# Patient Record
Sex: MALE | Race: WHITE | NOT HISPANIC OR LATINO | Employment: OTHER | URBAN - NONMETROPOLITAN AREA
[De-identification: names, ages, dates, MRNs, and addresses within clinical notes are randomized per-mention and may not be internally consistent; named-entity substitution may affect disease eponyms.]

---

## 2017-07-28 DIAGNOSIS — M54.50 LOW BACK PAIN: Primary | ICD-10-CM

## 2017-07-31 ENCOUNTER — HOSPITAL ENCOUNTER (OUTPATIENT)
Dept: MRI IMAGING | Facility: HOSPITAL | Age: 77
Discharge: HOME | End: 2017-07-31
Attending: FAMILY MEDICINE | Admitting: FAMILY MEDICINE
Payer: MEDICARE

## 2017-07-31 PROCEDURE — 72148 MRI LUMBAR SPINE W/O DYE: CPT | Mod: TC

## 2017-08-01 DIAGNOSIS — M54.50 LOWER BACK PAIN: ICD-10-CM

## 2017-08-01 DIAGNOSIS — M51.369 DDD (DEGENERATIVE DISC DISEASE), LUMBAR: Primary | ICD-10-CM

## 2017-08-03 ENCOUNTER — HOSPITAL ENCOUNTER (OUTPATIENT)
Dept: INTERVENTIONAL RADIOLOGY/VASCULAR | Facility: HOSPITAL | Age: 77
End: 2017-08-03
Attending: FAMILY MEDICINE
Payer: MEDICARE

## 2017-08-03 DIAGNOSIS — M51.369 DDD (DEGENERATIVE DISC DISEASE), LUMBAR: Primary | ICD-10-CM

## 2017-08-03 DIAGNOSIS — M79.605 LEFT LEG PAIN: Primary | ICD-10-CM

## 2017-08-03 RX ORDER — DEXAMETHASONE SODIUM PHOSPHATE 10 MG/ML
INJECTION, SOLUTION INTRAMUSCULAR; INTRAVENOUS
Status: DISCONTINUED
Start: 2017-08-03 | End: 2017-08-03 | Stop reason: WASHOUT

## 2017-08-03 RX ORDER — METHYLPREDNISOLONE ACETATE 80 MG/ML
INJECTION, SUSPENSION INTRA-ARTICULAR; INTRALESIONAL; INTRAMUSCULAR; SOFT TISSUE
Status: DISCONTINUED
Start: 2017-08-03 | End: 2017-08-03 | Stop reason: WASHOUT

## 2017-08-03 RX ORDER — METHYLPREDNISOLONE ACETATE 80 MG/ML
80 INJECTION, SUSPENSION INTRA-ARTICULAR; INTRALESIONAL; INTRAMUSCULAR; SOFT TISSUE ONCE
Status: DISCONTINUED | OUTPATIENT
Start: 2017-08-03 | End: 2017-08-03 | Stop reason: CLARIF

## 2017-08-03 RX ORDER — IOPAMIDOL 612 MG/ML
15 INJECTION, SOLUTION INTRATHECAL ONCE
Status: DISCONTINUED | OUTPATIENT
Start: 2017-08-03 | End: 2017-08-03 | Stop reason: CLARIF

## 2017-08-04 DIAGNOSIS — Z01.812 BLOOD TESTS PRIOR TO TREATMENT OR PROCEDURE: Primary | ICD-10-CM

## 2017-08-08 ENCOUNTER — APPOINTMENT (OUTPATIENT)
Dept: LAB | Facility: HOSPITAL | Age: 77
End: 2017-08-08
Attending: FAMILY MEDICINE
Payer: MEDICARE

## 2017-08-08 ENCOUNTER — HOSPITAL ENCOUNTER (OUTPATIENT)
Dept: CT IMAGING | Facility: HOSPITAL | Age: 77
Discharge: HOME | End: 2017-08-08
Attending: FAMILY MEDICINE | Admitting: FAMILY MEDICINE
Payer: MEDICARE

## 2017-08-08 PROCEDURE — 82565 ASSAY OF CREATININE: CPT | Performed by: FAMILY MEDICINE

## 2017-08-08 PROCEDURE — 75635 CT ANGIO ABDOMINAL ARTERIES: CPT | Mod: TC

## 2017-08-08 PROCEDURE — 36415 COLL VENOUS BLD VENIPUNCTURE: CPT | Performed by: FAMILY MEDICINE

## 2017-08-08 RX ORDER — IOPAMIDOL 755 MG/ML
75 INJECTION, SOLUTION INTRAVASCULAR ONCE
Status: COMPLETED | OUTPATIENT
Start: 2017-08-08 | End: 2017-08-08

## 2017-08-08 RX ORDER — IOPAMIDOL 755 MG/ML
75 INJECTION, SOLUTION INTRAVASCULAR ONCE
Status: DISCONTINUED | OUTPATIENT
Start: 2017-08-08 | End: 2017-08-08

## 2017-08-08 RX ADMIN — IOPAMIDOL 150 ML: 755 INJECTION, SOLUTION INTRAVASCULAR at 13:17

## 2017-08-09 LAB
CREAT SERPL-MCNC: 0.83 MG/DL (ref 0.66–1.25)
GFR SERPL CREATININE-BSD FRML MDRD: 90 ML/MIN/1.7M2

## 2017-10-12 ENCOUNTER — APPOINTMENT (OUTPATIENT)
Dept: CT IMAGING | Facility: HOSPITAL | Age: 77
End: 2017-10-12
Attending: FAMILY MEDICINE
Payer: MEDICARE

## 2017-10-12 ENCOUNTER — HOSPITAL ENCOUNTER (EMERGENCY)
Facility: HOSPITAL | Age: 77
Discharge: HOME | End: 2017-10-12
Attending: FAMILY MEDICINE | Admitting: FAMILY MEDICINE
Payer: MEDICARE

## 2017-10-12 VITALS
OXYGEN SATURATION: 96 % | DIASTOLIC BLOOD PRESSURE: 56 MMHG | SYSTOLIC BLOOD PRESSURE: 124 MMHG | TEMPERATURE: 98.7 F | RESPIRATION RATE: 18 BRPM

## 2017-10-12 DIAGNOSIS — N39.0 ACUTE UTI (URINARY TRACT INFECTION): ICD-10-CM

## 2017-10-12 DIAGNOSIS — D72.829 LEUKOCYTOSIS, UNSPECIFIED TYPE: ICD-10-CM

## 2017-10-12 LAB
ALBUMIN UR-MCNC: 30 MG/DL
ANION GAP SERPL CALCULATED.3IONS-SCNC: 5 MMOL/L (ref 3–14)
APPEARANCE UR: ABNORMAL
BACTERIA #/AREA URNS HPF: ABNORMAL /HPF
BASOPHILS # BLD AUTO: 0 10E9/L (ref 0–0.2)
BASOPHILS NFR BLD AUTO: 0.2 %
BILIRUB UR QL STRIP: NEGATIVE
BUN SERPL-MCNC: 17 MG/DL (ref 7–30)
CALCIUM SERPL-MCNC: 8.7 MG/DL (ref 8.5–10.1)
CHLORIDE SERPL-SCNC: 101 MMOL/L (ref 94–109)
CO2 SERPL-SCNC: 28 MMOL/L (ref 20–32)
COLOR UR AUTO: YELLOW
CREAT SERPL-MCNC: 0.89 MG/DL (ref 0.66–1.25)
DIFFERENTIAL METHOD BLD: ABNORMAL
EOSINOPHIL # BLD AUTO: 0.2 10E9/L (ref 0–0.7)
EOSINOPHIL NFR BLD AUTO: 1.1 %
ERYTHROCYTE [DISTWIDTH] IN BLOOD BY AUTOMATED COUNT: 14.7 % (ref 10–15)
GFR SERPL CREATININE-BSD FRML MDRD: 82 ML/MIN/1.7M2
GLUCOSE SERPL-MCNC: 134 MG/DL (ref 70–99)
GLUCOSE UR STRIP-MCNC: NEGATIVE MG/DL
HCT VFR BLD AUTO: 43 % (ref 40–53)
HGB BLD-MCNC: 14.5 G/DL (ref 13.3–17.7)
HGB UR QL STRIP: ABNORMAL
IMM GRANULOCYTES # BLD: 0.1 10E9/L (ref 0–0.4)
IMM GRANULOCYTES NFR BLD: 0.5 %
KETONES UR STRIP-MCNC: NEGATIVE MG/DL
LEUKOCYTE ESTERASE UR QL STRIP: ABNORMAL
LYMPHOCYTES # BLD AUTO: 1.7 10E9/L (ref 0.8–5.3)
LYMPHOCYTES NFR BLD AUTO: 8.2 %
MCH RBC QN AUTO: 34.4 PG (ref 26.5–33)
MCHC RBC AUTO-ENTMCNC: 33.7 G/DL (ref 31.5–36.5)
MCV RBC AUTO: 102 FL (ref 78–100)
MONOCYTES # BLD AUTO: 1.3 10E9/L (ref 0–1.3)
MONOCYTES NFR BLD AUTO: 6.1 %
MUCOUS THREADS #/AREA URNS LPF: PRESENT /LPF
NEUTROPHILS # BLD AUTO: 17.5 10E9/L (ref 1.6–8.3)
NEUTROPHILS NFR BLD AUTO: 83.9 %
NITRATE UR QL: NEGATIVE
NRBC # BLD AUTO: 0 10*3/UL
NRBC BLD AUTO-RTO: 0 /100
PH UR STRIP: 5.5 PH (ref 4.7–8)
PLATELET # BLD AUTO: 173 10E9/L (ref 150–450)
POTASSIUM SERPL-SCNC: 4 MMOL/L (ref 3.4–5.3)
RBC # BLD AUTO: 4.21 10E12/L (ref 4.4–5.9)
RBC #/AREA URNS AUTO: 139 /HPF (ref 0–2)
SODIUM SERPL-SCNC: 134 MMOL/L (ref 133–144)
SOURCE: ABNORMAL
SP GR UR STRIP: 1.02 (ref 1–1.03)
UROBILINOGEN UR STRIP-MCNC: NORMAL MG/DL (ref 0–2)
WBC # BLD AUTO: 20.9 10E9/L (ref 4–11)
WBC #/AREA URNS AUTO: >182 /HPF (ref 0–2)
WBC CLUMPS #/AREA URNS HPF: PRESENT /HPF

## 2017-10-12 PROCEDURE — 36415 COLL VENOUS BLD VENIPUNCTURE: CPT | Performed by: FAMILY MEDICINE

## 2017-10-12 PROCEDURE — 74176 CT ABD & PELVIS W/O CONTRAST: CPT | Mod: TC

## 2017-10-12 PROCEDURE — 87086 URINE CULTURE/COLONY COUNT: CPT | Performed by: FAMILY MEDICINE

## 2017-10-12 PROCEDURE — 99284 EMERGENCY DEPT VISIT MOD MDM: CPT | Mod: 25

## 2017-10-12 PROCEDURE — 81001 URINALYSIS AUTO W/SCOPE: CPT | Performed by: FAMILY MEDICINE

## 2017-10-12 PROCEDURE — 87088 URINE BACTERIA CULTURE: CPT | Performed by: FAMILY MEDICINE

## 2017-10-12 PROCEDURE — 80048 BASIC METABOLIC PNL TOTAL CA: CPT | Performed by: FAMILY MEDICINE

## 2017-10-12 PROCEDURE — 99285 EMERGENCY DEPT VISIT HI MDM: CPT | Performed by: FAMILY MEDICINE

## 2017-10-12 PROCEDURE — 87186 SC STD MICRODIL/AGAR DIL: CPT | Performed by: FAMILY MEDICINE

## 2017-10-12 PROCEDURE — 85025 COMPLETE CBC W/AUTO DIFF WBC: CPT | Performed by: FAMILY MEDICINE

## 2017-10-12 RX ORDER — CIPROFLOXACIN 500 MG/1
500 TABLET, FILM COATED ORAL 2 TIMES DAILY
Qty: 10 TABLET | Refills: 0 | Status: SHIPPED | OUTPATIENT
Start: 2017-10-12 | End: 2017-10-17

## 2017-10-12 RX ORDER — CLOPIDOGREL BISULFATE 75 MG/1
75 TABLET ORAL AT BEDTIME
COMMUNITY

## 2017-10-12 ASSESSMENT — ENCOUNTER SYMPTOMS
ABDOMINAL PAIN: 0
FREQUENCY: 1
SHORTNESS OF BREATH: 0
ACTIVITY CHANGE: 0
FATIGUE: 1
DYSURIA: 1
PSYCHIATRIC NEGATIVE: 1
NEUROLOGICAL NEGATIVE: 1
CHEST TIGHTNESS: 0
FEVER: 0
BACK PAIN: 0
FLANK PAIN: 1

## 2017-10-12 NOTE — ED NOTES
Presents to ER with c/o increased urinary frequency and burning with urination for past 2 past days. Denies other symptoms or pain, see assessments. Encouraged fluids for urine sample, was instructed on obtaining urine sample. Call light within reach.

## 2017-10-12 NOTE — PROGRESS NOTES
CT abdomen and pelvis report routed to Dr Dwyer. IMPRESSION:  1. Bibasilar interstitial thickening.  2. Multiple calcified gallstones. Pt advised to follow up with PCP as needed.

## 2017-10-12 NOTE — DISCHARGE INSTRUCTIONS
Urinary Tract Infections in Men  Urinary tract infections (UTIs) are most often caused by bacteria (germs) that invade the urinary tract. The bacteria may come from outside the body. Or they may travel from the skin outside of rectum into the urethra. Pain in or around the urinary tract is a common symptom for most UTIs. But the only way to know for sure if you have a UTI is to have a urinalysis and urine culture.     Four Types of UTIs    Cystitis: A bladder infection, or cystitis, is often linked to a blockage from an enlarged prostate. You may have an urgent or frequent need to urinate, and bloody urine. Treatment includes antibiotics and medications to relax or shrink the prostate. In some cases, surgery is needed.    Urethritis: This is an infection of the urethra. You may have a discharge from the urethra or burning when you urinate.You may also have pain in the urethra or penis. Urethritis is treated with antibiotics.    Prostatitis: This is an inflammation or infection of the prostate. You may have an urgent or frequent need to urinate, fever, or burning when you urinate. Or you may have a tender prostate, or a vague feeling of pressure. Prostatitis is treated with a range of medications, depending on the cause.    Pyelonephritis: This is a kidney infection. If not treated, it can be serious and damage your kidneys. In severe cases you may be hospitalized. You may have a fever and upper back pain.  Treating a UTI    Medications: Most UTIs are treated with antibiotics. These kill the bacteria. The length of time you need to take them depends on the type of infection. Take antibiotics exactly as directed until all of the medication is gone. If you do not, the infection may not go away and may become harder to treat. For certain types of UTIs, you may be given other medications to help treat your symptoms.    Lifestyle changes: The lifestyle changes below will help get rid of your current infection. They may  also help prevent future UTIs.    Drink plenty of fluids such as water, juice, or other caffeine-free drinks. This helps flush bacteria out of your system.    Empty your bladder when you feel the urge to urinate and before going to sleep. Urine that stays in your bladder promotes infection.    Use condoms during sex. These help prevent UTIs caused by sexually transmitted bacteria.    Keep follow-up appointments with your health care provider. He or she can may do tests to make sure the infection has cleared. If necessary, additional treatment can be started.    Additional treatment: Most UTIs respond to medication. But sometimes a procedure or surgery is needed. This can treat an enlarged prostate, or remove a kidney stone or other blockage. Surgery may also treat problems caused by scarring or long-term infections.    3478-3596 [employer]. [address] [place] [address], [place] [address]. All rights reserved. This information is not intended as a substitute for professional medical care. Always follow your healthcare professional's instructions.

## 2017-10-12 NOTE — ED AVS SNAPSHOT
[facility]    [address] [address]    Phone:  [phone]                                       Antonio Shoemaker   MRN: [id number]    Department:  [facility]   Date of Visit:  10/12/2017           After Visit Summary Signature Page     I have received my discharge instructions, and my questions have been answered. I have discussed any challenges I see with this plan with the nurse or doctor.    ..........................................................................................................................................  Patient/Patient Representative Signature      ..........................................................................................................................................  Patient Representative Print Name and Relationship to Patient    ..................................................               ................................................  Date                                            Time    ..........................................................................................................................................  Reviewed by Signature/Title    ...................................................              ..............................................  Date                                                            Time

## 2017-10-12 NOTE — ED NOTES
Reviewed discharge instructions with pt and daughter, verbalized understanding, no questions. Informed prescription for Cipro was E-Scribed to [facility] and copy of discharge instructions sent

## 2017-10-12 NOTE — ED AVS SNAPSHOT
[facility]    [address] [address]    Phone:  [phone]                                       Antonio Shoemaker   MRN: [id number]    Department:  [facility]   Date of Visit:  10/12/2017           Patient Information     Date Of Birth          1940        Your diagnoses for this visit were:     Acute UTI (urinary tract infection)     Leukocytosis, unspecified type        You were seen by Sarah Looney MD.      Follow-up Information     Follow up with Tommy Dwyer MD.    Specialty:  Family Practice    Why:  As needed    Contact information:    [facility]  [address] [id number] [phone]          Follow up with urologist.    Why:  As scheduled        Discharge Instructions         Urinary Tract Infections in Men  Urinary tract infections (UTIs) are most often caused by bacteria (germs) that invade the urinary tract. The bacteria may come from outside the body. Or they may travel from the skin outside of rectum into the urethra. Pain in or around the urinary tract is a common symptom for most UTIs. But the only way to know for sure if you have a UTI is to have a urinalysis and urine culture.     Four Types of UTIs    Cystitis: A bladder infection, or cystitis, is often linked to a blockage from an enlarged prostate. You may have an urgent or frequent need to urinate, and bloody urine. Treatment includes antibiotics and medications to relax or shrink the prostate. In some cases, surgery is needed.    Urethritis: This is an infection of the urethra. You may have a discharge from the urethra or burning when you urinate.You may also have pain in the urethra or penis. Urethritis is treated with antibiotics.    Prostatitis: This is an inflammation or infection of the prostate. You may have an urgent or frequent need to urinate, fever, or burning when you urinate. Or you may have a tender prostate, or a vague feeling of  pressure. Prostatitis is treated with a range of medications, depending on the cause.    Pyelonephritis: This is a kidney infection. If not treated, it can be serious and damage your kidneys. In severe cases you may be hospitalized. You may have a fever and upper back pain.  Treating a UTI    Medications: Most UTIs are treated with antibiotics. These kill the bacteria. The length of time you need to take them depends on the type of infection. Take antibiotics exactly as directed until all of the medication is gone. If you do not, the infection may not go away and may become harder to treat. For certain types of UTIs, you may be given other medications to help treat your symptoms.    Lifestyle changes: The lifestyle changes below will help get rid of your current infection. They may also help prevent future UTIs.    Drink plenty of fluids such as water, juice, or other caffeine-free drinks. This helps flush bacteria out of your system.    Empty your bladder when you feel the urge to urinate and before going to sleep. Urine that stays in your bladder promotes infection.    Use condoms during sex. These help prevent UTIs caused by sexually transmitted bacteria.    Keep follow-up appointments with your health care provider. He or she can may do tests to make sure the infection has cleared. If necessary, additional treatment can be started.    Additional treatment: Most UTIs respond to medication. But sometimes a procedure or surgery is needed. This can treat an enlarged prostate, or remove a kidney stone or other blockage. Surgery may also treat problems caused by scarring or long-term infections.    2706-2917 [employer]. [address] [place] [address], [place] [address]. All rights reserved. This information is not intended as a substitute for professional medical care. Always follow your healthcare professional's instructions.             Review of your medicines      START taking        Dose / Directions Last  dose taken    ciprofloxacin 500 MG tablet   Commonly known as:  CIPRO   Dose:  500 mg   Quantity:  10 tablet        Take 1 tablet (500 mg) by mouth 2 times daily for 5 days   Refills:  0          Our records show that you are taking the medicines listed below. If these are incorrect, please call your family doctor or clinic.        Dose / Directions Last dose taken    aspirin 325 MG tablet   Dose:  162 mg        Take 162 mg by mouth daily   Refills:  0        DAILY MULTIVITAMIN PO        Refills:  0        fish oil-omega-3 fatty acids 1000 MG capsule   Dose:  2 g        Take 2 g by mouth daily   Refills:  0        Na Sulfate-K Sulfate-Mg Sulf solution   Commonly known as:  SUPREP BOWEL PREP KIT   Quantity:  2 Bottle        Use as directed   Refills:  0        PLAVIX PO   Dose:  75 mg        Take 75 mg by mouth daily   Refills:  0        XALATAN 0.005 % ophthalmic solution   Dose:  1 drop   Generic drug:  latanoprost        1 drop daily Left eye   Refills:  0                Prescriptions were sent or printed at these locations (1 Prescription)                   [facility] [address] [address]    Telephone:  [phone]   Fax:  [phone]   Hours:                  E-Prescribed (1 of 1)         ciprofloxacin (CIPRO) 500 MG tablet                Procedures and tests performed during your visit     Abd/pelvis CT - no contrast - Stone Protocol    Basic metabolic panel    CBC with platelets differential    UA reflex to Microscopic and Culture    Urine Culture Aerobic Bacterial      Orders Needing Specimen Collection     None      Pending Results     Date and Time Order Name Status Description    10/12/2017 0909 Abd/pelvis CT - no contrast - Stone Protocol Preliminary     10/12/2017 0903 Urine Culture Aerobic Bacterial In process             Pending Culture Results     Date and Time Order Name Status Description    10/12/2017 0903 Urine Culture Aerobic  "Bacterial In process             Thank you for choosing [facility]       Thank you for choosing [facility] for your care. Our goal is always to provide you with excellent care. Hearing back from our patients is one way we can continue to improve our services. Please take a few minutes to complete the written survey that you may receive in the mail after you visit with us. Thank you!        [employer] Information     [employer] lets you send messages to your doctor, view your test results, renew your prescriptions, schedule appointments and more. To sign up, go to [email or web address] . Click on \"Log in\" on the left side of the screen, which will take you to the Welcome page. Then click on \"Sign up Now\" on the right side of the page.     You will be asked to enter the access code listed below, as well as some personal information. Please follow the directions to create your username and password.     Your access code is: [email or web address]  Expires: 1/10/2018 10:49 AM     Your access code will  in 90 days. If you need help or a new code, please call your [facility] clinic or [phone].        Care EveryWhere ID     This is your Care EveryWhere ID. This could be used by other organizations to access your [facility] medical records  [id number]        Equal Access to Services     BAILEY HERNANDEZ : Regino nelsono Soisis, waaxda luqadaha, qaybta kaalmada adeegyada, max pascual. So [facility] [phone].    ATENCIÓN: Si habla español, tiene a ayala disposición servicios gratuitos de asistencia lingüística. Llame al [phone].    We comply with applicable federal civil rights laws and [place] laws. We do not discriminate on the basis of race, color, national origin, age, disability, sex, sexual orientation, or gender identity.            After Visit Summary       This is your record. Keep this with you and show to your community pharmacist(s) and doctor(s) at your next visit.                  "

## 2017-10-14 LAB
BACTERIA SPEC CULT: ABNORMAL
SPECIMEN SOURCE: ABNORMAL

## 2017-10-14 NOTE — PROGRESS NOTES
Urine Culture - Final - >100,000 colonies/mL Escherichia coli, sensitive to Cipro prescribed for patient 10/12 ED visit. Report routed to PCP, Dr. IBRAHIMA Dwyer.

## 2017-11-24 ENCOUNTER — HOSPITAL ENCOUNTER (OUTPATIENT)
Dept: INTERVENTIONAL RADIOLOGY/VASCULAR | Facility: HOSPITAL | Age: 77
Discharge: HOME | End: 2017-11-24
Attending: FAMILY MEDICINE | Admitting: FAMILY MEDICINE
Payer: MEDICARE

## 2017-11-24 DIAGNOSIS — M51.369 DEGENERATION OF LUMBAR INTERVERTEBRAL DISC: ICD-10-CM

## 2017-11-24 PROCEDURE — 25000128 H RX IP 250 OP 636: Performed by: RADIOLOGY

## 2017-11-24 PROCEDURE — 62323 NJX INTERLAMINAR LMBR/SAC: CPT | Mod: TC

## 2017-11-24 RX ORDER — IOPAMIDOL 612 MG/ML
15 INJECTION, SOLUTION INTRATHECAL ONCE
Status: COMPLETED | OUTPATIENT
Start: 2017-11-24 | End: 2017-11-24

## 2017-11-24 RX ORDER — METHYLPREDNISOLONE ACETATE 80 MG/ML
INJECTION, SUSPENSION INTRA-ARTICULAR; INTRALESIONAL; INTRAMUSCULAR; SOFT TISSUE
Status: DISCONTINUED
Start: 2017-11-24 | End: 2017-11-25 | Stop reason: HOSPADM

## 2017-11-24 RX ORDER — METHYLPREDNISOLONE ACETATE 80 MG/ML
80 INJECTION, SUSPENSION INTRA-ARTICULAR; INTRALESIONAL; INTRAMUSCULAR; SOFT TISSUE ONCE
Status: COMPLETED | OUTPATIENT
Start: 2017-11-24 | End: 2017-11-24

## 2017-11-24 RX ADMIN — IOPAMIDOL 5 ML: 612 INJECTION, SOLUTION INTRATHECAL at 15:00

## 2017-11-24 RX ADMIN — METHYLPREDNISOLONE ACETATE 80 MG: 80 INJECTION, SUSPENSION INTRA-ARTICULAR; INTRALESIONAL; INTRAMUSCULAR; SOFT TISSUE at 15:00

## 2017-11-24 NOTE — IP AVS SNAPSHOT
MRN:[id number]                      After Visit Summary   11/24/2017    Antonio Shoemaker    MRN: [id number]           Visit Information        Provider Department      11/24/2017  2:30 PM [facility]; [facility]           Review of your medicines      UNREVIEWED medicines. Ask your doctor about these medicines        Dose / Directions    aspirin 325 MG tablet        Dose:  162 mg   Take 162 mg by mouth daily   Refills:  0       DAILY MULTIVITAMIN PO        Refills:  0       fish oil-omega-3 fatty acids 1000 MG capsule        Dose:  2 g   Take 2 g by mouth daily   Refills:  0       Na Sulfate-K Sulfate-Mg Sulf solution   Commonly known as:  SUPREP BOWEL PREP KIT   Used for:  GI bleed        Use as directed   Quantity:  2 Bottle   Refills:  0       PLAVIX PO        Dose:  75 mg   Take 75 mg by mouth daily   Refills:  0       XALATAN 0.005 % ophthalmic solution   Generic drug:  latanoprost        Dose:  1 drop   1 drop daily Left eye   Refills:  0                Protect others around you: Learn how to safely use, store and throw away your medicines at www.disposemymeds.org.         Follow-ups after your visit         Care Instructions        Further instructions from your care team       Home number on file [phone] (home)  Is it ok to leave a message at this number(s)? Yes    Dr. Ireland completed your procedure on 11/24/2017.    Current Pain Level (0-10 Scale): 4/10  Post Pain Level (0-10):  0/10    Radiology Discharge instructions for Steroid Injection    Activity Level:     Do not do any heavy activity or exercise for 24 hours.   Do not drive for 4 hours after your injection.  Diet:   Return to your normal diet.  Medications:   If you have stopped taking your Aspirin, Coumadin/Warfarin, Ibuprofen, or any   other blood thinner for this procedure you may resume in the morning unless   your primary care provider has given you other instructions.    Diabetics may see an increase in  "blood sugar after steroid injections. If you are concerned about your blood sugar, please contact your family doctor.    Site Care:  Remove the bandage and bathe or shower the morning after the procedure.      Please allow two weeks to experience improvement in your pain.  If you have any further issues, please contact your provider.    Call your Primary Care Provider if you have the following (if your primary care provider is not available please seek emergency care):   Nausea with vomiting   Severe headache   Drowsiness or confusion   Redness or drainage at the injection or puncture site   Temperature over 101 degrees F   Other concerns   Worsening back pain   Stiff neck       Additional Information About Your Visit        [employer] Information     [employer] lets you send messages to your doctor, view your test results, renew your prescriptions, schedule appointments and more. To sign up, go to [email or web address] . Click on \"Log in\" on the left side of the screen, which will take you to the Welcome page. Then click on \"Sign up Now\" on the right side of the page.     You will be asked to enter the access code listed below, as well as some personal information. Please follow the directions to create your username and password.     Your access code is: [email or web address]  Expires: 1/10/2018  9:49 AM     Your access code will  in 90 days. If you need help or a new code, please call your [facility] clinic or [phone].        Care EveryWhere ID     This is your Care EveryWhere ID. This could be used by other organizations to access your [facility] medical records  [id number]         Primary Care Provider Office Phone # Fax #    Tommy Dwyer [phone]      Equal Access to Services     : Hadii jenniffer Aguayo, waaxda luqadaha, qaybta max brewster . So [facility] [phone].    ATENCIÓN: Si habla español, tiene a ayala disposición " servicios gratuitos de asistencia lingüística. Jennifer horner [phone].    We comply with applicable federal civil rights laws and [place] laws. We do not discriminate on the basis of race, color, national origin, age, disability, sex, sexual orientation, or gender identity.            Thank you!     Thank you for choosing [facility] for your care. Our goal is always to provide you with excellent care. Hearing back from our patients is one way we can continue to improve our services. Please take a few minutes to complete the written survey that you may receive in the mail after you visit with us. Thank you!             Medication List: This is a list of all your medications and when to take them. Check marks below indicate your daily home schedule. Keep this list as a reference.      Medications           Morning Afternoon Evening Bedtime As Needed    aspirin 325 MG tablet   Take 162 mg by mouth daily                                DAILY MULTIVITAMIN PO                                fish oil-omega-3 fatty acids 1000 MG capsule   Take 2 g by mouth daily                                Na Sulfate-K Sulfate-Mg Sulf solution   Commonly known as:  SUPREP BOWEL PREP KIT   Use as directed                                PLAVIX PO   Take 75 mg by mouth daily                                XALATAN 0.005 % ophthalmic solution   1 drop daily Left eye   Generic drug:  latanoprost

## 2017-11-24 NOTE — DISCHARGE INSTRUCTIONS
Home number on file [phone] (home)  Is it ok to leave a message at this number(s)? Yes    Dr. Ireland completed your procedure on 11/24/2017.    Current Pain Level (0-10 Scale): 4/10  Post Pain Level (0-10):  0/10    Radiology Discharge instructions for Steroid Injection    Activity Level:     Do not do any heavy activity or exercise for 24 hours.   Do not drive for 4 hours after your injection.  Diet:   Return to your normal diet.  Medications:   If you have stopped taking your Aspirin, Coumadin/Warfarin, Ibuprofen, or any   other blood thinner for this procedure you may resume in the morning unless   your primary care provider has given you other instructions.    Diabetics may see an increase in blood sugar after steroid injections. If you are concerned about your blood sugar, please contact your family doctor.    Site Care:  Remove the bandage and bathe or shower the morning after the procedure.      Please allow two weeks to experience improvement in your pain.  If you have any further issues, please contact your provider.    Call your Primary Care Provider if you have the following (if your primary care provider is not available please seek emergency care):   Nausea with vomiting   Severe headache   Drowsiness or confusion   Redness or drainage at the injection or puncture site   Temperature over 101 degrees F   Other concerns   Worsening back pain   Stiff neck

## 2017-11-24 NOTE — IP AVS SNAPSHOT
[facility]    [address] [address]    Phone:  [phone]    Fax:  [phone]                                       After Visit Summary   11/24/2017    Antonio Shoemaker    MRN: [id number]           After Visit Summary Signature Page     I have received my discharge instructions, and my questions have been answered. I have discussed any challenges I see with this plan with the nurse or doctor.    ..........................................................................................................................................  Patient/Patient Representative Signature      ..........................................................................................................................................  Patient Representative Print Name and Relationship to Patient    ..................................................               ................................................  Date                                            Time    ..........................................................................................................................................  Reviewed by Signature/Title    ...................................................              ..............................................  Date                                                            Time

## 2017-12-07 ENCOUNTER — TELEPHONE (OUTPATIENT)
Dept: INTERVENTIONAL RADIOLOGY/VASCULAR | Facility: HOSPITAL | Age: 77
End: 2017-12-07

## 2017-12-07 NOTE — TELEPHONE ENCOUNTER
INJECTION POST CALL    Procedure: LUCINA TL L4-5  Radiologist(s): Dr. Burke Ireland  Date of Procedure: 11/24/2017    I responded to the patient's questions/concerns.  Pre-procedure pain score was: 4 (See pre-procedure score)  Post-procedure pain score as of today is: 5  Pain has increased.  Where is the pain? Center to lower back  Is the pain radiating? No  Is this new pain? No  Patient would like to pursue another injection if recommended.  Patient will contact provider, Tommy Dwyer if there are any issues and for the results.   MIRNA COOK

## 2017-12-29 ENCOUNTER — HOSPITAL ENCOUNTER (OUTPATIENT)
Dept: INTERVENTIONAL RADIOLOGY/VASCULAR | Facility: HOSPITAL | Age: 77
Discharge: HOME | End: 2017-12-29
Attending: FAMILY MEDICINE | Admitting: FAMILY MEDICINE
Payer: MEDICARE

## 2017-12-29 DIAGNOSIS — M54.50 LOW BACK PAIN: ICD-10-CM

## 2017-12-29 DIAGNOSIS — M51.369 DDD (DEGENERATIVE DISC DISEASE), LUMBAR: ICD-10-CM

## 2017-12-29 PROCEDURE — 25000128 H RX IP 250 OP 636: Performed by: RADIOLOGY

## 2017-12-29 PROCEDURE — 62323 NJX INTERLAMINAR LMBR/SAC: CPT | Mod: TC

## 2017-12-29 RX ORDER — METHYLPREDNISOLONE ACETATE 80 MG/ML
INJECTION, SUSPENSION INTRA-ARTICULAR; INTRALESIONAL; INTRAMUSCULAR; SOFT TISSUE
Status: DISPENSED
Start: 2017-12-29 | End: 2017-12-29

## 2017-12-29 RX ORDER — IOPAMIDOL 612 MG/ML
15 INJECTION, SOLUTION INTRATHECAL ONCE
Status: COMPLETED | OUTPATIENT
Start: 2017-12-29 | End: 2017-12-29

## 2017-12-29 RX ORDER — METHYLPREDNISOLONE ACETATE 80 MG/ML
80 INJECTION, SUSPENSION INTRA-ARTICULAR; INTRALESIONAL; INTRAMUSCULAR; SOFT TISSUE ONCE
Status: COMPLETED | OUTPATIENT
Start: 2017-12-29 | End: 2017-12-29

## 2017-12-29 RX ADMIN — METHYLPREDNISOLONE ACETATE 80 MG: 80 INJECTION, SUSPENSION INTRA-ARTICULAR; INTRALESIONAL; INTRAMUSCULAR; SOFT TISSUE at 11:36

## 2017-12-29 RX ADMIN — IOPAMIDOL 6 ML: 612 INJECTION, SOLUTION INTRATHECAL at 11:36

## 2017-12-29 NOTE — DISCHARGE INSTRUCTIONS
Home number on file [phone] (home)  Is it ok to leave a message at this number(s)? Yes    Dr. Ireland completed your procedure on 12/29/2017.    Current Pain Level (0-10 Scale): 6/10  Post Pain Level (0-10):  2/10    Radiology Discharge instructions for Steroid Injection    Activity Level:     Do not do any heavy activity or exercise for 24 hours.   Do not drive for 4 hours after your injection.  Diet:   Return to your normal diet.  Medications:   If you have stopped taking your Aspirin, Coumadin/Warfarin, Ibuprofen, or any   other blood thinner for this procedure you may resume in the morning unless   your primary care provider has given you other instructions.    Diabetics may see an increase in blood sugar after steroid injections. If you are concerned about your blood sugar, please contact your family doctor.    Site Care:  Remove the bandage and bathe or shower the morning after the procedure.      Please allow two weeks to experience improvement in your pain.  If you have any further issues, please contact your provider.    Call your Primary Care Provider if you have the following (if your primary care provider is not available please seek emergency care):   Nausea with vomiting   Severe headache   Drowsiness or confusion   Redness or drainage at the injection or puncture site   Temperature over 101 degrees F   Other concerns   Worsening back pain   Stiff neck

## 2017-12-29 NOTE — IP AVS SNAPSHOT
MRN:[id number]                      After Visit Summary   12/29/2017    Antonio Shoemaker    MRN: [id number]           Visit Information        Provider Department      12/29/2017 11:30 AM HII[facility]AD; HIIR1 [facility]           Review of your medicines      UNREVIEWED medicines. Ask your doctor about these medicines        Dose / Directions    aspirin 325 MG tablet        Dose:  162 mg   Take 162 mg by mouth daily   Refills:  0       DAILY MULTIVITAMIN PO        Refills:  0       fish oil-omega-3 fatty acids 1000 MG capsule        Dose:  2 g   Take 2 g by mouth daily   Refills:  0       Na Sulfate-K Sulfate-Mg Sulf solution   Commonly known as:  SUPREP BOWEL PREP KIT   Used for:  GI bleed        Use as directed   Quantity:  2 Bottle   Refills:  0       PLAVIX PO        Dose:  75 mg   Take 75 mg by mouth daily   Refills:  0       XALATAN 0.005 % ophthalmic solution   Generic drug:  latanoprost        Dose:  1 drop   1 drop daily Left eye   Refills:  0                Protect others around you: Learn how to safely use, store and throw away your medicines at www.disposemymeds.org.         Follow-ups after your visit         Care Instructions        Further instructions from your care team       Home number on file [phone] (home)  Is it ok to leave a message at this number(s)? Yes    Dr. Ireland completed your procedure on 12/29/2017.    Current Pain Level (0-10 Scale): 6/10  Post Pain Level (0-10):  2/10    Radiology Discharge instructions for Steroid Injection    Activity Level:     Do not do any heavy activity or exercise for 24 hours.   Do not drive for 4 hours after your injection.  Diet:   Return to your normal diet.  Medications:   If you have stopped taking your Aspirin, Coumadin/Warfarin, Ibuprofen, or any   other blood thinner for this procedure you may resume in the morning unless   your primary care provider has given you other instructions.    Diabetics may see an increase in  "blood sugar after steroid injections. If you are concerned about your blood sugar, please contact your family doctor.    Site Care:  Remove the bandage and bathe or shower the morning after the procedure.      Please allow two weeks to experience improvement in your pain.  If you have any further issues, please contact your provider.    Call your Primary Care Provider if you have the following (if your primary care provider is not available please seek emergency care):   Nausea with vomiting   Severe headache   Drowsiness or confusion   Redness or drainage at the injection or puncture site   Temperature over 101 degrees F   Other concerns   Worsening back pain   Stiff neck       Additional Information About Your Visit        [employer] Information     [employer] lets you send messages to your doctor, view your test results, renew your prescriptions, schedule appointments and more. To sign up, go to [email or web address] . Click on \"Log in\" on the left side of the screen, which will take you to the Welcome page. Then click on \"Sign up Now\" on the right side of the page.     You will be asked to enter the access code listed below, as well as some personal information. Please follow the directions to create your username and password.     Your access code is: [email or web address]  Expires: 1/10/2018  9:49 AM     Your access code will  in 90 days. If you need help or a new code, please call your [place] clinic or [phone].        Care EveryWhere ID     This is your Care EveryWhere ID. This could be used by other organizations to access your [place] medical records  [id number]         Primary Care Provider Office Phone # Fax #    Tommy Dwyer [phone]      Equal Access to Services     [facility]: Hadii jenniffer Aguayo, waaxda luqadaha, qaybta max brewster . So [facility] [phone].    ATENCIÓN: Si habla español, tiene a ayala disposición " servicios gratuitos de asistencia lingüística. Jennifer horner [phone].    We comply with applicable federal civil rights laws and [place] laws. We do not discriminate on the basis of race, color, national origin, age, disability, sex, sexual orientation, or gender identity.            Thank you!     Thank you for choosing [place] for your care. Our goal is always to provide you with excellent care. Hearing back from our patients is one way we can continue to improve our services. Please take a few minutes to complete the written survey that you may receive in the mail after you visit with us. Thank you!             Medication List: This is a list of all your medications and when to take them. Check marks below indicate your daily home schedule. Keep this list as a reference.      Medications           Morning Afternoon Evening Bedtime As Needed    aspirin 325 MG tablet   Take 162 mg by mouth daily                                DAILY MULTIVITAMIN PO                                fish oil-omega-3 fatty acids 1000 MG capsule   Take 2 g by mouth daily                                Na Sulfate-K Sulfate-Mg Sulf solution   Commonly known as:  SUPREP BOWEL PREP KIT   Use as directed                                PLAVIX PO   Take 75 mg by mouth daily                                XALATAN 0.005 % ophthalmic solution   1 drop daily Left eye   Generic drug:  latanoprost

## 2017-12-29 NOTE — IP AVS SNAPSHOT
[facility]    [address] [address]    Phone:  [phone]    Fax:  [phone]                                       After Visit Summary   12/29/2017    Antonio Shoemaker    MRN: [id number]           After Visit Summary Signature Page     I have received my discharge instructions, and my questions have been answered. I have discussed any challenges I see with this plan with the nurse or doctor.    ..........................................................................................................................................  Patient/Patient Representative Signature      ..........................................................................................................................................  Patient Representative Print Name and Relationship to Patient    ..................................................               ................................................  Date                                            Time    ..........................................................................................................................................  Reviewed by Signature/Title    ...................................................              ..............................................  Date                                                            Time

## 2018-01-12 ENCOUNTER — TELEPHONE (OUTPATIENT)
Dept: INTERVENTIONAL RADIOLOGY/VASCULAR | Facility: HOSPITAL | Age: 78
End: 2018-01-12

## 2018-01-12 NOTE — TELEPHONE ENCOUNTER
INJECTION POST CALL    Procedure: LUCINA TL L4-5  Radiologist(s): Dr. Burke Ireland  Date of Procedure: 12/29/2017    I responded to the patient's questions/concerns.    Pre-procedure pain score was: 6 (See pre-procedure score)  Post-procedure pain score as of today is: 4  Percentage of pain reduction: 33%  Where is the pain? Lower back  Is the pain radiating? No  Is this new pain? No    Patient would not like to pursue another injection at this time. Patient stated that he will call his PCP, Reymundo Williamson, when he is ready for another injection.    MIRNA COOK

## 2018-09-05 ENCOUNTER — TELEPHONE (OUTPATIENT)
Dept: INTERVENTIONAL RADIOLOGY/VASCULAR | Facility: HOSPITAL | Age: 78
End: 2018-09-05

## 2018-09-05 NOTE — TELEPHONE ENCOUNTER
STEROID INJECTION REMINDER CALL    Called to remind patient of appointment on 09/07/2018 at 0830    Patient has not had a flu shot in the last two weeks.  Patient has not had immunizations in the last two weeks.  Patient has not had a steroid injection in the last two weeks.  Patient has not taken antibiotics in the past two weeks.    Discussed after care, and explained that a  needs to accompany patient to these appointments.  Patient verbalized an understanding of the  requirement.

## 2018-09-07 ENCOUNTER — HOSPITAL ENCOUNTER (OUTPATIENT)
Dept: INTERVENTIONAL RADIOLOGY/VASCULAR | Facility: HOSPITAL | Age: 78
Discharge: HOME | End: 2018-09-07
Attending: FAMILY MEDICINE | Admitting: FAMILY MEDICINE
Payer: MEDICARE

## 2018-09-07 DIAGNOSIS — M54.50 LOW BACK PAIN: ICD-10-CM

## 2018-09-07 DIAGNOSIS — M51.369 DEGENERATION OF LUMBAR INTERVERTEBRAL DISC: ICD-10-CM

## 2018-09-07 PROCEDURE — 25000125 ZZHC RX 250: Performed by: RADIOLOGY

## 2018-09-07 PROCEDURE — 25000128 H RX IP 250 OP 636: Performed by: RADIOLOGY

## 2018-09-07 PROCEDURE — 64483 NJX AA&/STRD TFRM EPI L/S 1: CPT | Mod: TC,LT

## 2018-09-07 RX ORDER — LIDOCAINE HYDROCHLORIDE 10 MG/ML
INJECTION, SOLUTION EPIDURAL; INFILTRATION; INTRACAUDAL; PERINEURAL
Status: DISPENSED
Start: 2018-09-07 | End: 2018-09-07

## 2018-09-07 RX ORDER — DEXAMETHASONE SODIUM PHOSPHATE 10 MG/ML
INJECTION, SOLUTION INTRAMUSCULAR; INTRAVENOUS
Status: DISPENSED
Start: 2018-09-07 | End: 2018-09-07

## 2018-09-07 RX ORDER — IOPAMIDOL 612 MG/ML
15 INJECTION, SOLUTION INTRATHECAL ONCE
Status: COMPLETED | OUTPATIENT
Start: 2018-09-07 | End: 2018-09-07

## 2018-09-07 RX ORDER — DEXAMETHASONE SODIUM PHOSPHATE 10 MG/ML
10 INJECTION, SOLUTION INTRAMUSCULAR; INTRAVENOUS ONCE
Status: COMPLETED | OUTPATIENT
Start: 2018-09-07 | End: 2018-09-07

## 2018-09-07 RX ORDER — LIDOCAINE HYDROCHLORIDE 10 MG/ML
5 INJECTION, SOLUTION EPIDURAL; INFILTRATION; INTRACAUDAL; PERINEURAL ONCE
Status: COMPLETED | OUTPATIENT
Start: 2018-09-07 | End: 2018-09-07

## 2018-09-07 RX ORDER — METHYLPREDNISOLONE ACETATE 80 MG/ML
INJECTION, SUSPENSION INTRA-ARTICULAR; INTRALESIONAL; INTRAMUSCULAR; SOFT TISSUE
Status: DISCONTINUED
Start: 2018-09-07 | End: 2018-09-07 | Stop reason: WASHOUT

## 2018-09-07 RX ADMIN — IOPAMIDOL 3 ML: 612 INJECTION, SOLUTION INTRATHECAL at 08:55

## 2018-09-07 RX ADMIN — DEXAMETHASONE SODIUM PHOSPHATE 10 MG: 10 INJECTION, SOLUTION INTRAMUSCULAR; INTRAVENOUS at 08:55

## 2018-09-07 RX ADMIN — LIDOCAINE HYDROCHLORIDE 5 ML: 10 INJECTION, SOLUTION EPIDURAL; INFILTRATION; INTRACAUDAL; PERINEURAL at 08:56

## 2018-09-07 NOTE — IP AVS SNAPSHOT
[facility] INTERVENTIONAL RAD    [address] [address]    Phone:  [phone]    Fax:  [phone]                                       After Visit Summary   9/7/2018    Antonio Shoemaker    MRN: [id number]           After Visit Summary Signature Page     I have received my discharge instructions, and my questions have been answered. I have discussed any challenges I see with this plan with the nurse or doctor.    ..........................................................................................................................................  Patient/Patient Representative Signature      ..........................................................................................................................................  Patient Representative Print Name and Relationship to Patient    ..................................................               ................................................  Date                                            Time    ..........................................................................................................................................  Reviewed by Signature/Title    ...................................................              ..............................................  Date                                                            Time          22EPIC Rev 08/18

## 2018-09-07 NOTE — DISCHARGE INSTRUCTIONS
Home number on file [phone] (home)  Is it ok to leave a message at this number(s)? Yes    Dr. Ireland completed your procedure on 9/7/2018.    Current Pain Level (0-10 Scale): 3/10  Post Pain Level (0-10):  0/10    Radiology Discharge instructions for Steroid Injection    Activity Level:     Do not do any heavy activity or exercise for 24 hours.   Do not drive for 4 hours after your injection.  Diet:   Return to your normal diet.  Medications:   If you have stopped taking your Aspirin, Coumadin/Warfarin, Ibuprofen, or any   other blood thinner for this procedure you may resume in the morning unless   your primary care provider has given you other instructions.    Diabetics may see an increase in blood sugar after steroid injections. If you are concerned about your blood sugar, please contact your family doctor.    Site Care:  Remove the bandage and bathe or shower the morning after the procedure.      Please allow two weeks to experience improvement in your pain.  If you have any further issues, please contact your provider.    Call your Primary Care Provider if you have the following (if your primary care provider is not available please seek emergency care):   Nausea with vomiting   Severe headache   Drowsiness or confusion   Redness or drainage at the injection or puncture site   Temperature over 101 degrees F   Other concerns   Worsening back pain   Stiff neck

## 2018-09-07 NOTE — IP AVS SNAPSHOT
MRN:[id number]                      After Visit Summary   9/7/2018    Antonio Shoemaker    MRN: [id number]           Visit Information        Provider Department      9/7/2018  8:30 AM HIIRRAD; HIIR1 [facility]           Review of your medicines      UNREVIEWED medicines. Ask your doctor about these medicines        Dose / Directions    aspirin 325 MG tablet        Dose:  162 mg   Take 162 mg by mouth daily   Refills:  0       DAILY MULTIVITAMIN PO        Refills:  0       fish oil-omega-3 fatty acids 1000 MG capsule        Dose:  2 g   Take 2 g by mouth daily   Refills:  0       Na Sulfate-K Sulfate-Mg Sulf solution   Commonly known as:  SUPREP BOWEL PREP KIT   Used for:  GI bleed        Use as directed   Quantity:  2 Bottle   Refills:  0       PLAVIX PO        Dose:  75 mg   Take 75 mg by mouth daily   Refills:  0       XALATAN 0.005 % ophthalmic solution   Generic drug:  latanoprost        Dose:  1 drop   1 drop daily Left eye   Refills:  0                Protect others around you: Learn how to safely use, store and throw away your medicines at www.disposemymeds.org.         Follow-ups after your visit         Care Instructions        Further instructions from your care team       Home number on file [phone] (home)  Is it ok to leave a message at this number(s)? Yes    Dr. Ireland completed your procedure on 9/7/2018.    Current Pain Level (0-10 Scale): 3/10  Post Pain Level (0-10):  0/10    Radiology Discharge instructions for Steroid Injection    Activity Level:     Do not do any heavy activity or exercise for 24 hours.   Do not drive for 4 hours after your injection.  Diet:   Return to your normal diet.  Medications:   If you have stopped taking your Aspirin, Coumadin/Warfarin, Ibuprofen, or any   other blood thinner for this procedure you may resume in the morning unless   your primary care provider has given you other instructions.    Diabetics may see an increase in blood  "sugar after steroid injections. If you are concerned about your blood sugar, please contact your family doctor.    Site Care:  Remove the bandage and bathe or shower the morning after the procedure.      Please allow two weeks to experience improvement in your pain.  If you have any further issues, please contact your provider.    Call your Primary Care Provider if you have the following (if your primary care provider is not available please seek emergency care):   Nausea with vomiting   Severe headache   Drowsiness or confusion   Redness or drainage at the injection or puncture site   Temperature over 101 degrees F   Other concerns   Worsening back pain   Stiff neck           Additional Information About Your Visit        [employer] Information     [employer] lets you send messages to your doctor, view your test results, renew your prescriptions, schedule appointments and more. To sign up, go to [email or web address] . Click on \"Log in\" on the left side of the screen, which will take you to the Welcome page. Then click on \"Sign up Now\" on the right side of the page.     You will be asked to enter the access code listed below, as well as some personal information. Please follow the directions to create your username and password.     Your access code is: [email or web address]  Expires: 2018  8:59 AM     Your access code will  in 90 days. If you need help or a new code, please call your [place] clinic or [phone].        Care EveryWhere ID     This is your Care EveryWhere ID. This could be used by other organizations to access your [facility] medical records  [id number]         Primary Care Provider Office Phone # Fax #    Tommy Dwyer [phone]      Equal Access to Services     [facility]: Hadii jenniffer Aguayo, waaxda luqadaha, qaybta kamax sousa . So [facility] [phone].    ATENCIÓN: Si habla español, tiene a ayala disposición " servicios gratuitos de asistencia lingüística. Jennifer horner [phone].    We comply with applicable federal civil rights laws and [place] laws. We do not discriminate on the basis of race, color, national origin, age, disability, sex, sexual orientation, or gender identity.            Thank you!     Thank you for choosing [place] for your care. Our goal is always to provide you with excellent care. Hearing back from our patients is one way we can continue to improve our services. Please take a few minutes to complete the written survey that you may receive in the mail after you visit with us. Thank you!             Medication List: This is a list of all your medications and when to take them. Check marks below indicate your daily home schedule. Keep this list as a reference.      Medications           Morning Afternoon Evening Bedtime As Needed    aspirin 325 MG tablet   Take 162 mg by mouth daily                                DAILY MULTIVITAMIN PO                                fish oil-omega-3 fatty acids 1000 MG capsule   Take 2 g by mouth daily                                Na Sulfate-K Sulfate-Mg Sulf solution   Commonly known as:  SUPREP BOWEL PREP KIT   Use as directed                                PLAVIX PO   Take 75 mg by mouth daily                                XALATAN 0.005 % ophthalmic solution   1 drop daily Left eye   Generic drug:  latanoprost

## 2018-09-21 ENCOUNTER — TELEPHONE (OUTPATIENT)
Dept: INTERVENTIONAL RADIOLOGY/VASCULAR | Facility: HOSPITAL | Age: 78
End: 2018-09-21

## 2018-09-21 NOTE — TELEPHONE ENCOUNTER
[unfilled]    INJECTION POST CALL    Procedure: LUCINA Lumbar  Radiologist(s): Dr. Corey Fisher  Date of Procedure: 09/07/2018    I responded to the patient's questions/concerns.    Pre-procedure pain score was: 3 (See pre-procedure score)  Post-procedure pain score as of today is: 3  Percentage of pain reduction: 0%  Where is the pain located? Lower back  Is the pain radiating? No    Is this new pain? No    Patient will contact the provider if there are any issues.      MIRNA COOK

## 2018-12-23 ENCOUNTER — HOSPITAL ENCOUNTER (EMERGENCY)
Facility: HOSPITAL | Age: 78
Discharge: ANOTHER HOSPITAL | End: 2018-12-23
Attending: PHYSICIAN ASSISTANT | Admitting: PHYSICIAN ASSISTANT
Payer: MEDICARE

## 2018-12-23 ENCOUNTER — APPOINTMENT (OUTPATIENT)
Dept: GENERAL RADIOLOGY | Facility: HOSPITAL | Age: 78
End: 2018-12-23
Attending: PHYSICIAN ASSISTANT
Payer: MEDICARE

## 2018-12-23 ENCOUNTER — MEDICAL CORRESPONDENCE (OUTPATIENT)
Dept: HEALTH INFORMATION MANAGEMENT | Facility: CLINIC | Age: 78
End: 2018-12-23

## 2018-12-23 ENCOUNTER — TRANSFERRED RECORDS (OUTPATIENT)
Dept: HEALTH INFORMATION MANAGEMENT | Facility: CLINIC | Age: 78
End: 2018-12-23

## 2018-12-23 VITALS
WEIGHT: 173 LBS | SYSTOLIC BLOOD PRESSURE: 97 MMHG | OXYGEN SATURATION: 95 % | HEART RATE: 105 BPM | DIASTOLIC BLOOD PRESSURE: 71 MMHG | HEIGHT: 65 IN | BODY MASS INDEX: 28.82 KG/M2 | RESPIRATION RATE: 18 BRPM | TEMPERATURE: 97.5 F

## 2018-12-23 DIAGNOSIS — I21.4 NSTEMI (NON-ST ELEVATED MYOCARDIAL INFARCTION) (H): ICD-10-CM

## 2018-12-23 LAB
ANION GAP SERPL CALCULATED.3IONS-SCNC: 5 MMOL/L (ref 3–14)
BASE DEFICIT BLDA-SCNC: 0.4 MMOL/L
BASOPHILS # BLD AUTO: 0 10E9/L (ref 0–0.2)
BASOPHILS NFR BLD AUTO: 0.3 %
BUN SERPL-MCNC: 26 MG/DL (ref 7–30)
CALCIUM SERPL-MCNC: 8.8 MG/DL (ref 8.5–10.1)
CHLORIDE SERPL-SCNC: 107 MMOL/L (ref 94–109)
CO2 SERPL-SCNC: 29 MMOL/L (ref 20–32)
CREAT SERPL-MCNC: 1.12 MG/DL (ref 0.66–1.25)
DIFFERENTIAL METHOD BLD: ABNORMAL
EOSINOPHIL # BLD AUTO: 0 10E9/L (ref 0–0.7)
EOSINOPHIL NFR BLD AUTO: 0.3 %
ERYTHROCYTE [DISTWIDTH] IN BLOOD BY AUTOMATED COUNT: 14.2 % (ref 10–15)
GFR SERPL CREATININE-BSD FRML MDRD: 61 ML/MIN/{1.73_M2}
GLUCOSE SERPL-MCNC: 199 MG/DL (ref 70–99)
HCO3 BLD-SCNC: 24 MMOL/L (ref 21–28)
HCT VFR BLD AUTO: 43.5 % (ref 40–53)
HGB BLD-MCNC: 14.3 G/DL (ref 13.3–17.7)
IMM GRANULOCYTES # BLD: 0 10E9/L (ref 0–0.4)
IMM GRANULOCYTES NFR BLD: 0.3 %
LYMPHOCYTES # BLD AUTO: 1.4 10E9/L (ref 0.8–5.3)
LYMPHOCYTES NFR BLD AUTO: 12.4 %
MCH RBC QN AUTO: 33.8 PG (ref 26.5–33)
MCHC RBC AUTO-ENTMCNC: 32.9 G/DL (ref 31.5–36.5)
MCV RBC AUTO: 103 FL (ref 78–100)
MONOCYTES # BLD AUTO: 0.8 10E9/L (ref 0–1.3)
MONOCYTES NFR BLD AUTO: 7.7 %
NEUTROPHILS # BLD AUTO: 8.7 10E9/L (ref 1.6–8.3)
NEUTROPHILS NFR BLD AUTO: 79 %
NRBC # BLD AUTO: 0 10*3/UL
NRBC BLD AUTO-RTO: 0 /100
NT-PROBNP SERPL-MCNC: 7508 PG/ML (ref 0–1800)
O2/TOTAL GAS SETTING VFR VENT: ABNORMAL %
OXYHGB MFR BLD: 94 % (ref 92–100)
PCO2 BLD: 40 MM HG (ref 35–45)
PH BLD: 7.4 PH (ref 7.35–7.45)
PLATELET # BLD AUTO: 201 10E9/L (ref 150–450)
PO2 BLD: 75 MM HG (ref 80–105)
POTASSIUM SERPL-SCNC: 4.9 MMOL/L (ref 3.4–5.3)
RBC # BLD AUTO: 4.23 10E12/L (ref 4.4–5.9)
SODIUM SERPL-SCNC: 141 MMOL/L (ref 133–144)
TROPONIN I SERPL-MCNC: 5.71 UG/L (ref 0–0.04)
WBC # BLD AUTO: 11 10E9/L (ref 4–11)

## 2018-12-23 PROCEDURE — 93010 ELECTROCARDIOGRAM REPORT: CPT | Mod: 76 | Performed by: INTERNAL MEDICINE

## 2018-12-23 PROCEDURE — 84484 ASSAY OF TROPONIN QUANT: CPT | Performed by: PHYSICIAN ASSISTANT

## 2018-12-23 PROCEDURE — 93005 ELECTROCARDIOGRAM TRACING: CPT

## 2018-12-23 PROCEDURE — A9270 NON-COVERED ITEM OR SERVICE: HCPCS | Mod: GY | Performed by: PHYSICIAN ASSISTANT

## 2018-12-23 PROCEDURE — 96375 TX/PRO/DX INJ NEW DRUG ADDON: CPT

## 2018-12-23 PROCEDURE — 80048 BASIC METABOLIC PNL TOTAL CA: CPT | Performed by: PHYSICIAN ASSISTANT

## 2018-12-23 PROCEDURE — 99285 EMERGENCY DEPT VISIT HI MDM: CPT | Mod: 25

## 2018-12-23 PROCEDURE — 82805 BLOOD GASES W/O2 SATURATION: CPT | Performed by: PHYSICIAN ASSISTANT

## 2018-12-23 PROCEDURE — 36415 COLL VENOUS BLD VENIPUNCTURE: CPT | Performed by: PHYSICIAN ASSISTANT

## 2018-12-23 PROCEDURE — 96374 THER/PROPH/DIAG INJ IV PUSH: CPT

## 2018-12-23 PROCEDURE — 36600 WITHDRAWAL OF ARTERIAL BLOOD: CPT

## 2018-12-23 PROCEDURE — 25000128 H RX IP 250 OP 636: Performed by: PHYSICIAN ASSISTANT

## 2018-12-23 PROCEDURE — 71045 X-RAY EXAM CHEST 1 VIEW: CPT | Mod: TC

## 2018-12-23 PROCEDURE — 99284 EMERGENCY DEPT VISIT MOD MDM: CPT | Mod: Z6 | Performed by: PHYSICIAN ASSISTANT

## 2018-12-23 PROCEDURE — 25000132 ZZH RX MED GY IP 250 OP 250 PS 637: Mod: GY | Performed by: PHYSICIAN ASSISTANT

## 2018-12-23 PROCEDURE — 83880 ASSAY OF NATRIURETIC PEPTIDE: CPT | Performed by: PHYSICIAN ASSISTANT

## 2018-12-23 PROCEDURE — 85025 COMPLETE CBC W/AUTO DIFF WBC: CPT | Performed by: PHYSICIAN ASSISTANT

## 2018-12-23 RX ORDER — TURMERIC ROOT EXTRACT 500 MG
1 TABLET ORAL DAILY
Status: ON HOLD | COMMUNITY
End: 2022-01-01

## 2018-12-23 RX ORDER — TAMSULOSIN HYDROCHLORIDE 0.4 MG/1
0.8 CAPSULE ORAL AT BEDTIME
COMMUNITY

## 2018-12-23 RX ORDER — FINASTERIDE 5 MG/1
5 TABLET, FILM COATED ORAL AT BEDTIME
COMMUNITY

## 2018-12-23 RX ORDER — HEPARIN SODIUM 10000 [USP'U]/100ML
0-3500 INJECTION, SOLUTION INTRAVENOUS CONTINUOUS
Status: DISCONTINUED | OUTPATIENT
Start: 2018-12-23 | End: 2018-12-23 | Stop reason: HOSPADM

## 2018-12-23 RX ORDER — SODIUM CHLORIDE 9 MG/ML
INJECTION, SOLUTION INTRAVENOUS CONTINUOUS
Status: DISCONTINUED | OUTPATIENT
Start: 2018-12-23 | End: 2018-12-23 | Stop reason: HOSPADM

## 2018-12-23 RX ORDER — ASPIRIN 81 MG/1
324 TABLET, CHEWABLE ORAL ONCE
Status: COMPLETED | OUTPATIENT
Start: 2018-12-23 | End: 2018-12-23

## 2018-12-23 RX ORDER — ASPIRIN 81 MG/1
324 TABLET, CHEWABLE ORAL ONCE
Status: DISCONTINUED | OUTPATIENT
Start: 2018-12-23 | End: 2018-12-23 | Stop reason: HOSPADM

## 2018-12-23 RX ORDER — HEPARIN SODIUM 10000 [USP'U]/100ML
0-3500 INJECTION, SOLUTION INTRAVENOUS CONTINUOUS
Status: DISCONTINUED | OUTPATIENT
Start: 2018-12-23 | End: 2018-12-23

## 2018-12-23 RX ORDER — FUROSEMIDE 10 MG/ML
20 INJECTION INTRAMUSCULAR; INTRAVENOUS ONCE
Status: COMPLETED | OUTPATIENT
Start: 2018-12-23 | End: 2018-12-23

## 2018-12-23 RX ADMIN — HEPARIN SODIUM 942 UNITS/HR: 10000 INJECTION, SOLUTION INTRAVENOUS at 14:40

## 2018-12-23 RX ADMIN — SODIUM CHLORIDE: 9 INJECTION, SOLUTION INTRAVENOUS at 14:21

## 2018-12-23 RX ADMIN — ASPIRIN 81 MG 324 MG: 81 TABLET ORAL at 12:59

## 2018-12-23 RX ADMIN — FUROSEMIDE 20 MG: 10 INJECTION, SOLUTION INTRAVENOUS at 14:30

## 2018-12-23 RX ADMIN — SODIUM CHLORIDE 1000 ML: 9 INJECTION, SOLUTION INTRAVENOUS at 13:00

## 2018-12-23 ASSESSMENT — ENCOUNTER SYMPTOMS
COUGH: 1
NAUSEA: 0
ABDOMINAL PAIN: 0
PSYCHIATRIC NEGATIVE: 1
EYES NEGATIVE: 1
PALPITATIONS: 0
FATIGUE: 1
WEAKNESS: 1
SHORTNESS OF BREATH: 1
VOMITING: 0
DIARRHEA: 1
MUSCULOSKELETAL NEGATIVE: 1

## 2018-12-23 ASSESSMENT — MIFFLIN-ST. JEOR: SCORE: 1421.6

## 2018-12-23 NOTE — ED NOTES
DATE:  12/23/2018   TIME OF RECEIPT FROM LAB:  1300  LAB TEST:  Troponin  LAB VALUE:  5.708  RESULTS GIVEN WITH READ-BACK TO (PROVIDER):  YESENIA Easton  TIME LAB VALUE REPORTED TO PROVIDER:   2200

## 2018-12-23 NOTE — ED PROVIDER NOTES
History     Chief Complaint   Patient presents with     Shortness of Breath     Per daughter O2 sats were in the 80's at home     Generalized Weakness     HPI  Antonio Shoemaker is a 78 year old male with Hx CAD who presents with his daughter due to shortness of breath and weakness that started yesterday. Antonio reports he has been coughing infrequently for a few days and had stomach flu yesterday. He was on the treadmill yesterday, but had to cut it short d/t shortness of breath. Daughter was able to check home O2sats and they were in the low 80's. He was short of breath this morning with minimal exertion, prompting visit.  He denies any pain at this time. No fevers, but has felt chilled. No Hx of pulmonary Dz, but was a smoker until 4 yrs ago. He takes plavix, flomax, proscar and some supplements. He denies bloody or black stools, no known Hx PUD, no Hx anemia.    Problem List:    There are no active problems to display for this patient.       Past Medical History:    History reviewed. No pertinent past medical history.    Past Surgical History:    Past Surgical History:   Procedure Laterality Date     COLONOSCOPY  8/12/2013    Procedure: COLONOSCOPY;   Upper Endoscopy w/ biopsies and COLONOSCOPY;  Surgeon: Sheri Joseph DO;  Location: [facility]     NASAL SCOPE,BX/RMV POLYP/DEBRID       ORTHOPEDIC SURGERY      knee     VASCULAR SURGERY      carotid       Family History:    History reviewed. No pertinent family history.    Social History:  Marital Status:   [5]  Social History     Tobacco Use     Smoking status: Former Smoker     Smokeless tobacco: Never Used   Substance Use Topics     Alcohol use: No     Drug use: No        Medications:      Clopidogrel Bisulfate (PLAVIX PO)   doxylamine (UNISOM) 25 MG TABS tablet   finasteride (PROSCAR) 5 MG tablet   Garlic 100 MG TABS   tamsulosin (FLOMAX) 0.4 MG capsule   Turmeric 500 MG TABS   Multiple Vitamin (DAILY MULTIVITAMIN PO)   tiZANidine (ZANAFLEX) 4 MG tablet  "        Review of Systems   Constitutional: Positive for fatigue.   Eyes: Negative.    Respiratory: Positive for cough and shortness of breath.    Cardiovascular: Negative for chest pain, palpitations and leg swelling.   Gastrointestinal: Positive for diarrhea. Negative for abdominal pain, nausea and vomiting.   Genitourinary: Negative.    Musculoskeletal: Negative.    Skin: Negative.    Neurological: Positive for weakness. Negative for syncope.   Psychiatric/Behavioral: Negative.        Physical Exam   BP: 130/88  Pulse: 105  Heart Rate: 103  Temp: 97.5  F (36.4  C)  Resp: 18  Height: 165.1 cm (5' 5\")  Weight: 78.5 kg (173 lb)(stated)  SpO2: (!) 82 %      Physical Exam   Constitutional: He is oriented to person, place, and time.   Thin, pleasant male, somewhat pale.   HENT:   Head: Normocephalic and atraumatic.   Eyes: EOM are normal. Pupils are equal, round, and reactive to light.   Cardiovascular: Regular rhythm, normal heart sounds and intact distal pulses. Exam reveals no friction rub.   No murmur heard.  Tachy, low 100s.   Pulmonary/Chest: He is in respiratory distress. He has rales (bilateral bases).   Abdominal: Soft. Bowel sounds are normal. There is no tenderness.   Neurological: He is alert and oriented to person, place, and time.   Skin:   Cool at digits upon arrival.    Nursing note and vitals reviewed.      ED Course        Procedures               EKG Interpretation:      Interpreted by Rustam Doherty  Time reviewed: 1244  Symptoms at time of EKG: dyspnea   Rhythm: sinus tachy, PVCs  Rate: 104  Axis: Normal  Ectopy: premature ventricular contractions (unifocal)  Conduction: normal  ST Segments/ T Waves: Non-specific ST-T wave changes, ST elevation V2, inversion III  Q Waves: nonspecific  Comparison to prior: No old EKG available    Clinical Impression: non-specific EKG    REPEAT ECG, 1347  NS, 99rate, persistent T wave inversion lead III, ST elevation V2.      Results for orders placed or performed " during the hospital encounter of 12/23/18 (from the past 24 hour(s))   Blood gas arterial and oxyhgb   Result Value Ref Range    pH Arterial 7.40 7.35 - 7.45 pH    pCO2 Arterial 40 35 - 45 mm Hg    pO2 Arterial 75 (L) 80 - 105 mm Hg    Bicarbonate Arterial 24 21 - 28 mmol/L    FIO2 36%     Oxyhemoglobin Arterial 94 92 - 100 %    Base Deficit Art 0.4 mmol/L   Basic metabolic panel   Result Value Ref Range    Sodium 141 133 - 144 mmol/L    Potassium 4.9 3.4 - 5.3 mmol/L    Chloride 107 94 - 109 mmol/L    Carbon Dioxide 29 20 - 32 mmol/L    Anion Gap 5 3 - 14 mmol/L    Glucose 199 (H) 70 - 99 mg/dL    Urea Nitrogen 26 7 - 30 mg/dL    Creatinine 1.12 0.66 - 1.25 mg/dL    GFR Estimate 61 >60 mL/min/[1.73_m2]    GFR Estimate If Black 71 >60 mL/min/[1.73_m2]    Calcium 8.8 8.5 - 10.1 mg/dL   CBC with platelets differential   Result Value Ref Range    WBC 11.0 4.0 - 11.0 10e9/L    RBC Count 4.23 (L) 4.4 - 5.9 10e12/L    Hemoglobin 14.3 13.3 - 17.7 g/dL    Hematocrit 43.5 40.0 - 53.0 %     (H) 78 - 100 fl    MCH 33.8 (H) 26.5 - 33.0 pg    MCHC 32.9 31.5 - 36.5 g/dL    RDW 14.2 10.0 - 15.0 %    Platelet Count 201 150 - 450 10e9/L    Diff Method Automated Method     % Neutrophils 79.0 %    % Lymphocytes 12.4 %    % Monocytes 7.7 %    % Eosinophils 0.3 %    % Basophils 0.3 %    % Immature Granulocytes 0.3 %    Nucleated RBCs 0 0 /100    Absolute Neutrophil 8.7 (H) 1.6 - 8.3 10e9/L    Absolute Lymphocytes 1.4 0.8 - 5.3 10e9/L    Absolute Monocytes 0.8 0.0 - 1.3 10e9/L    Absolute Eosinophils 0.0 0.0 - 0.7 10e9/L    Absolute Basophils 0.0 0.0 - 0.2 10e9/L    Abs Immature Granulocytes 0.0 0 - 0.4 10e9/L    Absolute Nucleated RBC 0.0    NT pro BNP   Result Value Ref Range    N-Terminal Pro BNP Inpatient 7,508 (H) 0 - 1,800 pg/mL   Troponin I   Result Value Ref Range    Troponin I ES 5.708 (HH) 0.000 - 0.045 ug/L   Chest  XR, 1 view portable    Narrative    EXAM:XR CHEST PORT 1 VW     CLINICAL HISTORY: Patient Age  78 years  Additional clinical info:  shortness of breath     COMPARISON: Chest CT 7/15/2008    TECHNIQUE: Single view      Impression    IMPRESSION:   Diffuse bilateral interstitial infiltrates with consolidation or  volume loss in the lung bases and probable small bilateral pleural  effusions. Degenerative arthritis in the shoulders and spine.    JOHANNA MACE MD       Medications   aspirin (ASA) chewable tablet 324 mg (324 mg Oral Given 12/23/18 1259)   0.9% sodium chloride BOLUS (0 mLs Intravenous Stopped 12/23/18 1420)   furosemide (LASIX) injection 20 mg (20 mg Intravenous Given 12/23/18 1430)   heparin Loading Dose from infusion pump *Give when STARTING heparin infusion 4,700 Units (4,000 Units Intravenous Given 12/23/18 1436)       Assessments & Plan (with Medical Decision Making)     I have reviewed the nursing notes.  I have reviewed the findings, diagnosis, plan and need for follow up with the patient.       Medication List      There are no discharge medications for this visit.         Final diagnoses:   NSTEMI (non-ST elevated myocardial infarction) (H)   Pt received IVF, EKG and labs obtained upon arrival. Initial EKG nonspecific with T wave inversion III, ST elevation V2. Trop called back at 5.7. I spoke with Dr Bee with [facility] and recommends starting ASA and heparin after CXR and r/o dissection/pneomonia. Repeat EKG does not reveal evolving STEMI. On CXR he does have costophrenic blunting, no discreet consolidation. 20 of lasix is administered. He is given ASA, heparin and will be transferred out ALS. He is on 4L O2, IVF maintenence.     12/23/2018   [facility]     Rustam Doherty PA  12/23/18 1732

## 2018-12-23 NOTE — ED NOTES
Condition stable, [facility] ambulance here and transferring patient to [facility] in [place].

## 2018-12-23 NOTE — ED NOTES
"Patient to ED room 3 via w/c with daughter accompanying. Daughter states that patient has been weak over the last couple days. Patient felt like \"the flu was coming on\". Daughter states that O2 sats at home yesterday were in the 80's and patient states that he talked her out of brining him yesterday. Daughter's friend, who is an RN, came over to take vitals and they talked patient in to coming in. Patient into gown, monitors placed, EKG completed. Patient was placed on O2 via NC at 4L with sats 95%. YESENIA Easton at bedside to see patient.   "

## 2018-12-24 ENCOUNTER — TRANSFERRED RECORDS (OUTPATIENT)
Dept: HEALTH INFORMATION MANAGEMENT | Facility: CLINIC | Age: 78
End: 2018-12-24

## 2018-12-24 LAB — EJECTION FRACTION: 32

## 2018-12-26 ENCOUNTER — TRANSFERRED RECORDS (OUTPATIENT)
Dept: HEALTH INFORMATION MANAGEMENT | Facility: CLINIC | Age: 78
End: 2018-12-26

## 2018-12-27 ENCOUNTER — TRANSFERRED RECORDS (OUTPATIENT)
Dept: HEALTH INFORMATION MANAGEMENT | Facility: CLINIC | Age: 78
End: 2018-12-27

## 2019-01-08 ENCOUNTER — TELEPHONE (OUTPATIENT)
Dept: CARDIAC REHAB | Facility: HOSPITAL | Age: 79
End: 2019-01-08

## 2019-01-08 NOTE — TELEPHONE ENCOUNTER
Antonio states he is currently doing in home PT and OT, he is scheduled through the end of January.   He will be called again at the end of the month to schedule Cardiac Rehab upon completion of his current therapy.

## 2019-01-22 NOTE — PROGRESS NOTES
I received paperwork today (POLST form), indicating that patient requests a DNR status .  It was signed by  PCP, Dr. Dwyer.  Orders entered in EPIC.  Form sent for scanning.    Kymberly Cullen CNP, [facility]  Hospice and Palliative [facility]

## 2019-01-30 ENCOUNTER — TELEPHONE (OUTPATIENT)
Dept: CARDIAC REHAB | Facility: HOSPITAL | Age: 79
End: 2019-01-30

## 2019-01-30 NOTE — TELEPHONE ENCOUNTER
Patient was called to set up Cardiac Rehab appointment. He states he does not get around well, and would not be able to attend. Patient was notified the referral is valid for one year, and if he should change his mind, he can call to schedule.

## 2019-02-05 ENCOUNTER — TRANSFERRED RECORDS (OUTPATIENT)
Dept: HEALTH INFORMATION MANAGEMENT | Facility: CLINIC | Age: 79
End: 2019-02-05

## 2019-02-14 PROBLEM — I50.20 HEART FAILURE WITH REDUCED EJECTION FRACTION, NYHA CLASS II (H): Status: ACTIVE | Noted: 2019-02-14

## 2019-03-06 ENCOUNTER — HOSPITAL ENCOUNTER (EMERGENCY)
Facility: HOSPITAL | Age: 79
Discharge: HOME | End: 2019-03-07
Attending: INTERNAL MEDICINE | Admitting: INTERNAL MEDICINE
Payer: MEDICARE

## 2019-03-06 ENCOUNTER — APPOINTMENT (OUTPATIENT)
Dept: ULTRASOUND IMAGING | Facility: HOSPITAL | Age: 79
End: 2019-03-06
Attending: INTERNAL MEDICINE
Payer: MEDICARE

## 2019-03-06 DIAGNOSIS — K52.9 COLITIS: ICD-10-CM

## 2019-03-06 DIAGNOSIS — K80.20 CALCULUS OF GALLBLADDER WITHOUT CHOLECYSTITIS WITHOUT OBSTRUCTION: ICD-10-CM

## 2019-03-06 LAB
ALBUMIN SERPL-MCNC: 3.5 G/DL (ref 3.4–5)
ALP SERPL-CCNC: 283 U/L (ref 40–150)
ALT SERPL W P-5'-P-CCNC: 484 U/L (ref 0–70)
AMYLASE SERPL-CCNC: 65 U/L (ref 30–110)
ANION GAP SERPL CALCULATED.3IONS-SCNC: 6 MMOL/L (ref 3–14)
AST SERPL W P-5'-P-CCNC: 411 U/L (ref 0–45)
BASOPHILS # BLD AUTO: 0.1 10E9/L (ref 0–0.2)
BASOPHILS NFR BLD AUTO: 0.6 %
BILIRUB SERPL-MCNC: 0.9 MG/DL (ref 0.2–1.3)
BUN SERPL-MCNC: 27 MG/DL (ref 7–30)
CALCIUM SERPL-MCNC: 8.8 MG/DL (ref 8.5–10.1)
CHLORIDE SERPL-SCNC: 106 MMOL/L (ref 94–109)
CO2 SERPL-SCNC: 28 MMOL/L (ref 20–32)
CREAT SERPL-MCNC: 1.12 MG/DL (ref 0.66–1.25)
CRP SERPL-MCNC: 10.2 MG/L (ref 0–8)
DIFFERENTIAL METHOD BLD: ABNORMAL
EOSINOPHIL # BLD AUTO: 0.7 10E9/L (ref 0–0.7)
EOSINOPHIL NFR BLD AUTO: 7.4 %
ERYTHROCYTE [DISTWIDTH] IN BLOOD BY AUTOMATED COUNT: 14.3 % (ref 10–15)
GFR SERPL CREATININE-BSD FRML MDRD: 61 ML/MIN/{1.73_M2}
GLUCOSE SERPL-MCNC: 117 MG/DL (ref 70–99)
HCT VFR BLD AUTO: 40.6 % (ref 40–53)
HGB BLD-MCNC: 13.5 G/DL (ref 13.3–17.7)
IMM GRANULOCYTES # BLD: 0 10E9/L (ref 0–0.4)
IMM GRANULOCYTES NFR BLD: 0.3 %
LACTATE BLD-SCNC: 1.4 MMOL/L (ref 0.7–2)
LIPASE SERPL-CCNC: 202 U/L (ref 73–393)
LYMPHOCYTES # BLD AUTO: 2 10E9/L (ref 0.8–5.3)
LYMPHOCYTES NFR BLD AUTO: 21.7 %
MCH RBC QN AUTO: 32.4 PG (ref 26.5–33)
MCHC RBC AUTO-ENTMCNC: 33.3 G/DL (ref 31.5–36.5)
MCV RBC AUTO: 97 FL (ref 78–100)
MONOCYTES # BLD AUTO: 0.8 10E9/L (ref 0–1.3)
MONOCYTES NFR BLD AUTO: 8.4 %
NEUTROPHILS # BLD AUTO: 5.8 10E9/L (ref 1.6–8.3)
NEUTROPHILS NFR BLD AUTO: 61.6 %
NRBC # BLD AUTO: 0 10*3/UL
NRBC BLD AUTO-RTO: 0 /100
PLATELET # BLD AUTO: 199 10E9/L (ref 150–450)
POTASSIUM SERPL-SCNC: 4.5 MMOL/L (ref 3.4–5.3)
PROT SERPL-MCNC: 7.6 G/DL (ref 6.8–8.8)
RBC # BLD AUTO: 4.17 10E12/L (ref 4.4–5.9)
SODIUM SERPL-SCNC: 140 MMOL/L (ref 133–144)
TROPONIN I SERPL-MCNC: 0.04 UG/L (ref 0–0.04)
WBC # BLD AUTO: 9.4 10E9/L (ref 4–11)

## 2019-03-06 PROCEDURE — 83690 ASSAY OF LIPASE: CPT | Performed by: INTERNAL MEDICINE

## 2019-03-06 PROCEDURE — 99285 EMERGENCY DEPT VISIT HI MDM: CPT | Mod: 25

## 2019-03-06 PROCEDURE — 76705 ECHO EXAM OF ABDOMEN: CPT | Mod: TC

## 2019-03-06 PROCEDURE — 86140 C-REACTIVE PROTEIN: CPT | Performed by: INTERNAL MEDICINE

## 2019-03-06 PROCEDURE — 84484 ASSAY OF TROPONIN QUANT: CPT | Performed by: INTERNAL MEDICINE

## 2019-03-06 PROCEDURE — A9270 NON-COVERED ITEM OR SERVICE: HCPCS | Mod: GY | Performed by: INTERNAL MEDICINE

## 2019-03-06 PROCEDURE — 83605 ASSAY OF LACTIC ACID: CPT | Performed by: INTERNAL MEDICINE

## 2019-03-06 PROCEDURE — 93005 ELECTROCARDIOGRAM TRACING: CPT

## 2019-03-06 PROCEDURE — 99285 EMERGENCY DEPT VISIT HI MDM: CPT | Mod: Z6 | Performed by: INTERNAL MEDICINE

## 2019-03-06 PROCEDURE — 82150 ASSAY OF AMYLASE: CPT | Performed by: INTERNAL MEDICINE

## 2019-03-06 PROCEDURE — 93010 ELECTROCARDIOGRAM REPORT: CPT | Performed by: INTERNAL MEDICINE

## 2019-03-06 PROCEDURE — 85025 COMPLETE CBC W/AUTO DIFF WBC: CPT | Performed by: INTERNAL MEDICINE

## 2019-03-06 PROCEDURE — 25000128 H RX IP 250 OP 636: Performed by: INTERNAL MEDICINE

## 2019-03-06 PROCEDURE — 36415 COLL VENOUS BLD VENIPUNCTURE: CPT | Performed by: INTERNAL MEDICINE

## 2019-03-06 PROCEDURE — 96361 HYDRATE IV INFUSION ADD-ON: CPT

## 2019-03-06 PROCEDURE — 96374 THER/PROPH/DIAG INJ IV PUSH: CPT | Mod: XU

## 2019-03-06 PROCEDURE — 80053 COMPREHEN METABOLIC PANEL: CPT | Performed by: INTERNAL MEDICINE

## 2019-03-06 PROCEDURE — 25000132 ZZH RX MED GY IP 250 OP 250 PS 637: Mod: GY | Performed by: INTERNAL MEDICINE

## 2019-03-06 RX ORDER — METOCLOPRAMIDE HYDROCHLORIDE 5 MG/ML
5 INJECTION INTRAMUSCULAR; INTRAVENOUS ONCE
Status: COMPLETED | OUTPATIENT
Start: 2019-03-06 | End: 2019-03-06

## 2019-03-06 RX ORDER — ATORVASTATIN CALCIUM 40 MG/1
40 TABLET, FILM COATED ORAL AT BEDTIME
COMMUNITY

## 2019-03-06 RX ORDER — CHLORAL HYDRATE 500 MG
2 CAPSULE ORAL DAILY
COMMUNITY
End: 2020-07-16

## 2019-03-06 RX ORDER — FUROSEMIDE 20 MG
20 TABLET ORAL DAILY
Status: ON HOLD | COMMUNITY
End: 2022-01-01

## 2019-03-06 RX ORDER — LANOLIN ALCOHOL/MO/W.PET/CERES
9 CREAM (GRAM) TOPICAL AT BEDTIME
COMMUNITY

## 2019-03-06 RX ORDER — BISACODYL 10 MG
10 SUPPOSITORY, RECTAL RECTAL ONCE
Status: COMPLETED | OUTPATIENT
Start: 2019-03-06 | End: 2019-03-06

## 2019-03-06 RX ORDER — METOPROLOL SUCCINATE 50 MG/1
50 TABLET, EXTENDED RELEASE ORAL DAILY
Status: ON HOLD | COMMUNITY
End: 2022-01-01

## 2019-03-06 RX ORDER — MIRTAZAPINE 45 MG/1
45 TABLET, FILM COATED ORAL AT BEDTIME
COMMUNITY

## 2019-03-06 RX ORDER — LISINOPRIL 5 MG/1
5 TABLET ORAL DAILY
Status: ON HOLD | COMMUNITY
End: 2022-01-01

## 2019-03-06 RX ADMIN — SODIUM PHOSPHATE, DIBASIC AND SODIUM PHOSPHATE, MONOBASIC 1 ENEMA: 7; 19 ENEMA RECTAL at 23:12

## 2019-03-06 RX ADMIN — BISACODYL 10 MG: 10 SUPPOSITORY RECTAL at 20:47

## 2019-03-06 RX ADMIN — SODIUM CHLORIDE 500 ML: 9 INJECTION, SOLUTION INTRAVENOUS at 20:42

## 2019-03-06 RX ADMIN — METOCLOPRAMIDE 5 MG: 5 INJECTION, SOLUTION INTRAMUSCULAR; INTRAVENOUS at 20:44

## 2019-03-06 RX ADMIN — MAGNESIUM HYDROXIDE 30 ML: 400 SUSPENSION ORAL at 20:44

## 2019-03-06 ASSESSMENT — MIFFLIN-ST. JEOR: SCORE: 1362.64

## 2019-03-06 NOTE — ED AVS SNAPSHOT
[facility]  [address] [address]  Phone:  [phone]                                    Antonio Shoemaker   MRN: [id number]    Department:  [facility]   Date of Visit:  3/6/2019           After Visit Summary Signature Page    I have received my discharge instructions, and my questions have been answered. I have discussed any challenges I see with this plan with the nurse or doctor.    ..........................................................................................................................................  Patient/Patient Representative Signature      ..........................................................................................................................................  Patient Representative Print Name and Relationship to Patient    ..................................................               ................................................  Date                                   Time    ..........................................................................................................................................  Reviewed by Signature/Title    ...................................................              ..............................................  Date                                               Time          22EPIC Rev 08/18

## 2019-03-07 ENCOUNTER — APPOINTMENT (OUTPATIENT)
Dept: CT IMAGING | Facility: HOSPITAL | Age: 79
End: 2019-03-07
Attending: INTERNAL MEDICINE
Payer: MEDICARE

## 2019-03-07 VITALS
BODY MASS INDEX: 26.66 KG/M2 | SYSTOLIC BLOOD PRESSURE: 136 MMHG | HEART RATE: 85 BPM | WEIGHT: 160 LBS | OXYGEN SATURATION: 99 % | DIASTOLIC BLOOD PRESSURE: 102 MMHG | TEMPERATURE: 97 F | RESPIRATION RATE: 16 BRPM | HEIGHT: 65 IN

## 2019-03-07 PROCEDURE — 74177 CT ABD & PELVIS W/CONTRAST: CPT | Mod: TC

## 2019-03-07 PROCEDURE — 25500064 ZZH RX 255 OP 636: Performed by: INTERNAL MEDICINE

## 2019-03-07 RX ORDER — IOPAMIDOL 612 MG/ML
100 INJECTION, SOLUTION INTRAVASCULAR ONCE
Status: COMPLETED | OUTPATIENT
Start: 2019-03-07 | End: 2019-03-07

## 2019-03-07 RX ADMIN — IOPAMIDOL 100 ML: 612 INJECTION, SOLUTION INTRAVENOUS at 00:37

## 2019-03-07 ASSESSMENT — ENCOUNTER SYMPTOMS
ANAL BLEEDING: 0
NAUSEA: 1
NECK PAIN: 0
CHILLS: 0
VOMITING: 0
FLANK PAIN: 0
SHORTNESS OF BREATH: 0
WHEEZING: 0
WEAKNESS: 0
CHEST TIGHTNESS: 0
LIGHT-HEADEDNESS: 0
CONSTIPATION: 1
COLOR CHANGE: 0
COUGH: 0
VOICE CHANGE: 0
BACK PAIN: 0
BLOOD IN STOOL: 0
DYSURIA: 0
DIZZINESS: 0
PALPITATIONS: 0
DIAPHORESIS: 0
MYALGIAS: 0
ABDOMINAL PAIN: 1
SLEEP DISTURBANCE: 0
FREQUENCY: 0
HEADACHES: 0
CONFUSION: 0
NUMBNESS: 0
ABDOMINAL DISTENTION: 0
FEVER: 0

## 2019-03-07 NOTE — ED NOTES
Pt c/o right sided abd pain for the past 3 hours, Pt rates pain 1/10 and states it feels like constipation.

## 2019-03-08 NOTE — ED PROVIDER NOTES
"  History     Chief Complaint   Patient presents with     Abdominal Pain     \"right upper abdominal pain for 3-4 hrs\"      Dizziness     \"for about 1 hour\"      The history is provided by the patient.   Abdominal Pain   Pain location:  RLQ  Pain quality: aching    Pain severity:  Moderate  Onset quality:  Gradual  Timing:  Constant  Progression:  Worsening  Chronicity:  New  Associated symptoms: constipation and nausea    Associated symptoms: no chest pain, no chills, no cough, no dysuria, no fever, no shortness of breath and no vomiting      Antonio Shoemaker is a 78 year old male who    Allergies:  No Known Allergies    Problem List:    Patient Active Problem List    Diagnosis Date Noted     Heart failure with reduced ejection fraction, NYHA class II (H) 02/14/2019     Priority: Medium     Benign prostatic hyperplasia with urinary retention 06/27/2016     Priority: Medium     History of CVA (cerebrovascular accident) 06/27/2016     Priority: Medium     Status post total left knee replacement 06/27/2016     Priority: Medium     Carotid artery stenosis 09/24/2009     Priority: Medium     Dyslipidemia 07/29/2009     Priority: Medium     Encounter for long-term (current) use of other medications 07/29/2009     Priority: Medium     Incidental lung nodule 07/02/2008     Priority: Medium     Overview:   IMO Update 10/11       CVA (cerebrovascular accident) (H) 06/21/2008     Priority: Medium     Overview:   IMO Update 10/11       Tobacco use disorder 05/05/2008     Priority: Medium     Osteoarthritis of knee 03/12/2008     Priority: Medium     Overview:   IMO Update 10/11       Ascites 07/28/2007     Priority: Medium     Overview:   IMO Update          Past Medical History:    No past medical history on file.    Past Surgical History:    Past Surgical History:   Procedure Laterality Date     COLONOSCOPY  8/12/2013    Procedure: COLONOSCOPY;   Upper Endoscopy w/ biopsies and COLONOSCOPY;  Surgeon: Sheri Joseph DO;  " "Location: [facility]     NASAL SCOPE,BX/RMV POLYP/DEBRID       ORTHOPEDIC SURGERY      knee     VASCULAR SURGERY      carotid       Family History:    No family history on file.    Social History:  Marital Status:   [5]  Social History     Tobacco Use     Smoking status: Former Smoker     Smokeless tobacco: Never Used   Substance Use Topics     Alcohol use: No     Drug use: No        Medications:      atorvastatin (LIPITOR) 40 MG tablet   Clopidogrel Bisulfate (PLAVIX PO)   finasteride (PROSCAR) 5 MG tablet   fish oil-omega-3 fatty acids 1000 MG capsule   furosemide (LASIX) 20 MG tablet   Garlic 100 MG TABS   lisinopril (PRINIVIL/ZESTRIL) 5 MG tablet   melatonin 3 MG CAPS   metoprolol succinate ER (TOPROL-XL) 50 MG 24 hr tablet   mirtazapine (REMERON) 30 MG tablet   tamsulosin (FLOMAX) 0.4 MG capsule   Turmeric 500 MG TABS         Review of Systems   Constitutional: Negative for chills, diaphoresis and fever.   HENT: Negative for voice change.    Eyes: Negative for visual disturbance.   Respiratory: Negative for cough, chest tightness, shortness of breath and wheezing.    Cardiovascular: Negative for chest pain, palpitations and leg swelling.   Gastrointestinal: Positive for abdominal pain, constipation and nausea. Negative for abdominal distention, anal bleeding, blood in stool and vomiting.   Genitourinary: Negative for decreased urine volume, dysuria, flank pain and frequency.   Musculoskeletal: Negative for back pain, gait problem, myalgias and neck pain.   Skin: Negative for color change, pallor and rash.   Neurological: Negative for dizziness, syncope, weakness, light-headedness, numbness and headaches.   Psychiatric/Behavioral: Negative for confusion, sleep disturbance and suicidal ideas.       Physical Exam   BP: (!) 86/63  Pulse: 62  Heart Rate: 60  Temp: 96.3  F (35.7  C)  Resp: 16  Height: 165.1 cm (5' 5\")  Weight: 72.6 kg (160 lb)  SpO2: (!) 76 %(unsure if accurate, fingers cold)      Physical Exam "   Constitutional: He is oriented to person, place, and time. He appears well-developed and well-nourished.   HENT:   Head: Normocephalic and atraumatic.   Eyes: Conjunctivae are normal. Pupils are equal, round, and reactive to light.   Neck: Normal range of motion. Neck supple. No JVD present. No tracheal deviation present. No thyromegaly present.   Cardiovascular: Normal rate, regular rhythm, normal heart sounds and intact distal pulses. Exam reveals no gallop.   No murmur heard.  Pulses:       Radial pulses are 1+ on the right side, and 1+ on the left side.        Dorsalis pedis pulses are 1+ on the right side, and 1+ on the left side.        Posterior tibial pulses are 1+ on the right side, and 1+ on the left side.   Pulmonary/Chest: Effort normal and breath sounds normal. No stridor. No respiratory distress. He has no wheezes. He has no rales. He exhibits no tenderness.   Abdominal: Soft. Bowel sounds are normal. He exhibits no distension and no mass. There is tenderness in the right lower quadrant. There is no rebound and no guarding.   Musculoskeletal: Normal range of motion. He exhibits no edema or tenderness.   Lymphadenopathy:     He has no cervical adenopathy.   Neurological: He is alert and oriented to person, place, and time.   Skin: Skin is warm. No rash noted. No erythema. No pallor.   Psychiatric: His behavior is normal.   Nursing note and vitals reviewed.      ED Course        Procedures                   Results for orders placed or performed during the hospital encounter of 03/06/19 (from the past 24 hour(s))   Abdomen US, limited (RUQ only)    Narrative    PROCEDURES: US ABDOMEN LIMITED    HISTORY: RUQ pain, RUQ pain.     TECHNIQUE: Ultrasound of the upper abdomen was performed.    COMPARISON: CT abdomen pelvis 10/12/2017     FINDINGS:    LIVER: The liver is diffusely heterogeneous in echotexture with a  nodular contour, question cirrhosis. No intrahepatic biliary  ductal  dilatation.    GALLBLADDER: Multiple gallstones are present. No definite gallbladder  wall thickening or pericholecystic fluid.    COMMON BILE DUCT: Not visible     PANCREAS: Obscured by bowel gas    KIDNEYS: The right kidney is normal in echotexture without  hydronephrosis. There is a 2.2 cm cyst in the right kidney.    ABDOMINAL AORTA AND IVC: Visualized portions of the IVC and abdominal  aorta are unremarkable. The images are limited by bowel gas.      Impression    IMPRESSION: Cholelithiasis. Limited due to bowel gas.    BRADY SIU MD   Abd/pelvis CT - IV contrast only TRAUMA / AAA    Narrative    PROCEDURE:  CT ABDOMEN PELVIS W CONTRAST    HISTORY:  Abd pain, unspecified; Abd pain, appendicitis suspected     TECHNIQUE:  Helical CT of the abdomen and pelvis was performed  following injection of intravenous contrast.     Sagittal and coronal reformatted images were reviewed.    COMPARISON:  CT abdomen and pelvis 10/12/2017, ultrasound 3/6/2019    FINDINGS:      There is a trace right pleural effusion. There is atelectasis at the  lung bases.    The liver has a subtly micronodular contour. Question if there is any  history of cirrhosis. There are numerous gallstones in the  gallbladder. The spleen, pancreas, and adrenal glands are intact.    There is a 2.2 cm cyst in the upper pole of the right kidney. There  are multiple left renal cysts, the largest measuring 4.6 cm.    The bowel is normal in caliber. The appendix is unremarkable. There is  mild wall thickening of the rectum.     The aorta is normal in size with scattered atherosclerotic  calcifications. Bilateral iliac artery stents are again noted.    No free fluid, free air or adenopathy is present.      No suspicious osseous lesions are identified. There are degenerative  changes in the spine.      Impression    IMPRESSION:    1. Mild rectal wall thickening, suggesting proctitis.  2. The appendix is normal.  3. Cholelithiasis.  4. Subtly nodular  liver contour. Question any history of liver disease  and/or cirrhosis.    BRADY SIU MD       Medications   0.9% sodium chloride BOLUS (0 mLs Intravenous Stopped 3/6/19 2135)   metoclopramide (REGLAN) injection 5 mg (5 mg Intravenous Given 3/6/19 2044)   magnesium hydroxide (MILK OF MAGNESIA) suspension 30 mL (30 mLs Oral Given 3/6/19 2044)   bisacodyl (DULCOLAX) Suppository 10 mg (10 mg Rectal Given 3/6/19 2047)   sodium phosphate (FLEET ENEMA) 1 enema (1 enema Rectal Given 3/6/19 2312)   iopamidol (ISOVUE-300) IV solution 61% 100 mL (100 mLs Intravenous Given 3/7/19 0037)   sodium chloride (PF) 0.9% PF flush 60 mL (50 mLs Intravenous Given 3/7/19 0040)       Assessments & Plan (with Medical Decision Making)   RLQ pain, constipation, hypotension  Responded to IVF NS 500mg, BP stabilized  Labs reviewed: elevated transaminases  RUQ sono: cholelithiasis, no cholecystitis  After receiving MOM, fleet enema and bisacodyl pt had a small bowel movement but continued to have RLQ pain  CT abd ordered,  vrad: colitis, proctitis  Pt observed in ER, all symptoms resolved, feeling hungary,  ate lunch box without problem  Reported that his pain gone  I advised him to return to ER if symptosm recurred, he understood and agreed  D C home, follow-up with PCP  ]  I have reviewed the nursing notes.    I have reviewed the findings, diagnosis, plan and need for follow up with the patient.         Medication List      There are no discharge medications for this visit.         Final diagnoses:   Colitis   Calculus of gallbladder without cholecystitis without obstruction       3/6/2019   [facility]     Mahendra Payton MD  03/07/19 6745

## 2019-03-28 ENCOUNTER — TELEPHONE (OUTPATIENT)
Dept: CARDIAC REHAB | Facility: HOSPITAL | Age: 79
End: 2019-03-28

## 2019-03-28 NOTE — TELEPHONE ENCOUNTER
Patient was called to see if he is interested in attending Phase II Cardiac Rehab at this time. Message was left for him to call back at [phone].

## 2019-04-09 ENCOUNTER — HOSPITAL ENCOUNTER (OUTPATIENT)
Dept: CARDIAC REHAB | Facility: HOSPITAL | Age: 79
Setting detail: THERAPIES SERIES
End: 2019-04-09
Attending: INTERNAL MEDICINE
Payer: MEDICARE

## 2019-04-09 VITALS — HEIGHT: 65 IN | BODY MASS INDEX: 26.66 KG/M2 | WEIGHT: 160 LBS

## 2019-04-09 PROCEDURE — 40000116 ZZH STATISTIC OP CR VISIT

## 2019-04-09 PROCEDURE — 93798 PHYS/QHP OP CAR RHAB W/ECG: CPT

## 2019-04-09 ASSESSMENT — 6 MINUTE WALK TEST (6MWT)
MALE CALC: 1332.29
GENDER SELECTION: MALE
PREDICTED: 1340.42
TOTAL DISTANCE WALKED (FT): 415
FEMALE CALC: 1248.48

## 2019-04-09 ASSESSMENT — MIFFLIN-ST. JEOR: SCORE: 1357.64

## 2019-04-09 NOTE — PROGRESS NOTES
04/09/19 1300   Session   Session Initial Evaluation and Exercise Prescription   Certified through this date 05/08/19   Cardiac Rehab Assessment   Cardiac Rehab Assessment 4/9: Antonio starts Phase II Cardiac Rehab today after having JUAN MANUEL X 2 to LAD on Dec. 26, 2018. Initially he did not want to start cardiac rehab, but has now been advised to attend. He is still somewhat reluctant as he does not feel he can do much. He is encouraged to attend three times per week and let staff know if he will not be able to attend. He is not interested in education sessions, but he was encouraged to attend. He would like to just be able to get up and do more. He states he wants to do things but his back pain and foot pain stop him from doing much. He stopped the 6MWT at 2:52. He was vague at the reason he stopped other than he could not go any further. He guesses it was because of fatigue. His SpO2 remained in the high 90's with his walk.   Bautista also has home oxygen prescribed. He states he checks his SpO2 close to every two hours. He has not needed to use oxygen for quite a while he says, as his saturations have been in the 90's. He does have poor circulation, and an ear probe was used to monitor SpO2 while in CR today.   The patient's history and clinical status including hemodynamics and ECG were evaluated. The patient was assessed to be stable and appropriate to begin exercise.   The patient's functional capacity and exercise prescription were determined by the completion of the 6 minute walk test. See results above. The patient was oriented to the program.  Risk factor profile was completed. Goals and objectives were discussed. CV response was WNL.  Good prognosis for reaching goals below. Skilled therapy is necessary in order to monitor CV response to exercise, to provide education on risk factors and behavior change counseling needed to achieve patient's goals.  Plan to progress to 30-40 minutes of exercise prior to  discharge from cardiac rehab.  Initial THR of 20-30 beats above RHR; Effort rating of 4-6. Initiate muscle conditioning as appropriate. Provide risk factor education and behavior change counseling.      General Information   Treatment Diagnosis NSTEMI   Date of Treatment Diagnosis 12/24/18   Secondary Treatment Diagnosis Stent   Significant Past CV History History of Heart Failure;PAD   Comorbidities PAD   Other Medical History Patient has degenerative disc, arthritis, bunion in left foot, history of depressioin, carotid artery stenosis with endocardiectomy, Raynaud's syndrome, as well as prostrate concerns.    Lead up symptoms Patient did not feel bad. His daughter and friend thought he did not look well and brought him in to urgent care. He was then transferred to [facility].    Hospital Location [facility] Discharge Date 12/28/19   Signs and Symptoms Post Hospital Discharge None   Comments Patient denies any issues since he had his MI.    Outpatient Cardiac Rehab Start Date 04/09/19   Primary Physician Reymundo   Primary Physician Follow Up Completed   Surgeon Jerald   Surgeon Follow Up Completed   Cardiologist Marek   Cardiologist Follow Up Completed   Ejection Fraction 32%   Risk Stratification High   Summary of Cath Report   Summary of Cath Report Available   Date Performed 12/24/19   Left Main Left main artery is normal caliber and length. There is no disease present. There are 2  moderate caliber diagonal branches which arise following the severe stenosis in the proximal LAD, there is no significant disease in the side branch arteries.    LAD The left antrior descending artery is normal caliber. There is a 95% critical stenosis in ht e proximal vessel.    LCX The circumflex is a moderate caliber vessel. The vessel has a 100% chronic total occlusioini in the proximal vessel The distal vessel and 2nd OM ffill via left to left collaterals.   OM The 1st obtuse marginal artery is  moderate caliber and has no disease, this vessel takes off before the circumflex occlusion.    RCA The RCA is normal caliber. The vessel is calcified. There is a 100% chronic total occlusion in the distal vessel. The right posterior descending artery and right posterolateral vessels fill via left to right collaterals.    Cath Report Comments Patient was not a candidate for bypass surgery and had JUAN MANUEL X 2 to LAD.    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 No   Have you fallen and had an injury in the past year? No   Timed Up and Go score (seconds) NA   Is patient a fall risk? No   Fall screen comments Patient uses a cane with a folding seat in case he needs to stop and rest.    Abuse Screen (yes response referral indicated)   Feels Unsafe at Home or Work/School no   Feels Threatened by Someone no   Does Anyone Try to Keep You From Having Contact with Others or Doing Things Outside Your Home? no   Physical Signs of Abuse Present no   Pain   Patient Currently in Pain Yes   Pain Location back    Pain Rating 4   Pain Description Ache   Pain Description Comment Patient has chronic back pain while he is walking.    Additional Pain Locations? Pain location 2   Pain Location 2 foot   Pain Rating 2 4   Pain Comments Patient had seen a chiropractor for many years, but does not see one anymore.    Physical Assessments   Incisions WNL   Edema None   Right Lung Sounds normal   Left Lung Sounds normal   Limitations No limitations   Comments Patient may be limited by his back and foot pain.    Individualized Treatment  "Plan   Monitored Sessions Scheduled 24   Monitored Sessions Attended 1   Oxygen   Supplemental Oxygen needed Yes   Oxygen liters at rest 2   Oxygen Usage Comment Patient monitors his SpO2 regularly throughout the day, and uses it if his SpO2 is low. He has not needed it in awhile.    Nutrition Management - Weight Management   Assessment Initial Assessment   Age 79   Weight 72.6 kg (160 lb)   Height 1.651 m (5' 5\")   BMI (Calculated) 26.63   Goal Weight 74.8 kg (165 lb)   Initial Rate Your Plate Score. Dietary tool to assess eating patterns. Scores range from 24 to 72. The higher the score the healthier the eating pattern. 55   Weight Management Comments Patient would like to gain a little weight.    Nutrition Management - Lipids   Lipids Labs Not Available   Prescribed Lipid Medication Yes   Statin Intensity High Intensity   Lipid Comments Patient takes medications as prescribed. The pharmacy has his pills pre-packaged for him for each day.    Nutrition Management - Diabetes   Diabetes No   Nutrition Management Summary   Dietary Recommendations Low Fat;Low Cholesterol;Low Sodium   Stages of Change for Diet Compliance Preparation   Interventions Planned Attend Nutrition Education Class(es)   Nutrition Summary Comments Patient's daughter lives in an apartment in his home. She cooks meals for him.    Nutrition Target Outcome Total Chol < 150, HDL > 40 (M), HDL > 50 (W), LDL < 70, Trig < 150   Psychosocial Management   Psychosocial Assessment Initial   Is there history of clinical depression or increased risk of depression? History of clinical depression   Current Level of Stress per Patient Report Moderate    Current Coping Skills Uses Stress Management/Relaxation Techniques   Initial Patient Health Questionnaire -9 Score (PHQ-9) for depression. 5-9 Minimal symptoms, 10-14 Minor depression, 15-19 Major depression, moderately severe, > 20 Major depression, severe  5   Initial [facility] Survey score.  Quality of " Life:   If total score > 25 review individual areas where patient rated a 4 or 5.  Consider patients current medical condition and what role that plays on the score.   Adjust treatment protocol to improve areas of concern.  Consider the following:  PHQ9 score, DASI, and re-assessment within the next 30 days to assist with developing treatments.  26   Stages of Change Preparation   Psychosocial Comments Patient is not interested in setting goals. Patient wishes to get up off of his butt to feel better, but his physical limitations prevent this, which frustrates him further.    Psychosocial Target Outcome Maximize coping skills   Other Core Components - Hypertension   History of or Diagnosis of Hypertension Yes   Currently taking Anti-Hypertensives Yes;Beta blocker;Ace Inhibitor   Hypertension Comments Patient has medications set up for him at pharmacy.    Other Core Components - Tobacco   History of Tobacco Use Yes   Tobacco Use Status Former (Quit > 6 mo ago)   Tobacco Habit Cigarettes   Tobacco Use per Day (average) 1   Years of Tobacco Use 40   Stages of Change Maintenance   Tobacco Comments Patient has an occassional urge to smoke, he does not act on. He states it has been many years since he smoked.    Other Core Components Summary   Interventions Planned Instruct patient on the DASH diet;Educate on importance of maintaining low sodium diet   Other Core Components Comments Patient states he quit drinking many years ago as well.  Staff will follow up with patient concerning his CHF and oxygen usage at home.    Other Core Components Target Outcome Compliance with Diet, Medications and Symptom Management to allow for stable Heart Failure   Activity/Exercise History   Activity/Exercise Assessment Initial   Activity/Exercise Status prior to event? Sedentary   Current Stage of Change (Physical Activity) Contemplation   Current Stage of Change (Aerobic Exercise) Contemplation   Patient Goals Goal #1   Goal #1  Description Patient would like to be able to rake leaves and shovel snow by increasing strength and endurance.    Goal #1 Target Date 06/25/19   Activity/Exercise Comments Patient has not been active because of his back and foot pain. He does have a treadmill at home that he has not been using.    Activity/Exercise Target Outcome An Accumulation of 150  Minutes of Aerobic Activity per Week   Exercise Assessment   6 Minute Walk Predicted - Gender Selection Male   6 Minute Walk Predicted (Male) 1332.29   Initial 6 Minute Walk Distance (Feet) 415 ft   Resting HR 64 bpm   Exercise HR 83 bpm   Post Exercise HR 63 bpm   Resting /78   Exercise /78   Post Exercise /76   Pre SpO2 99   While Exercising SpO2 98   Post SpO2 98   Effort Rating 6   Current MET Level 1.6   MET Level Goal 3   ECG Rhythm Sinus rhythm   Ectopy PVCs   Current Symptoms Fatigue   Limitations/Restrictions Orthopedic (see comments)  (Patient states his back and foot hurt frequently. )   Exercise Prescription   Mode Treadmill;Nustep;Recumbant bike;Arm Ergometer   Duration/Time 15-30 min   Frequency 3 daysweek   THR (85% of age predicted max HR) 118.15   OMNI Effort Rating (0-10 Scale) 4-6/10   Progression Aerobic exercise to OMNI rating of 6 or below and at or below THR;Progress peak intensity by 1/4 MET per week;Intermittent bouts   Comments Patient would like to increase his exercise abilities.    Recommended Home Exercise   Type of Exercise Walking   Frequency (days per week) 3-4   Duration (minutes per session) 15-30 min   Effort Rating Recommended 4-6/10   30 Day Exercise Plan Patient encouraged to start walking at home.    Current Home Exercise   Type of Exercise None   Follow-up/On-going Support   Provider follow-up needed on the following No follow-up needed   Learning Assessment   Learner Patient   Primary Language English   Preferred Learning Style Demonstration;Pictures/Video   Barriers to Learning No barriers noted   Patient  Education   Education recommended Anatomy and Physiology of the Heart;Blood Pressure;Exercise Principles;Heart Failure;Medication Overview;Muscle Conditioning;Nutrition;Risk Factors;Stress Management   Education Comments Patient was informed of education times and encouraged to attend.    Physician cosignature/electronic signature indicates approval of this ITP document. I have established, reviewed and made necessary changes to the individualized treatment plan and exercise prescription for this patient.

## 2019-04-15 ENCOUNTER — HOSPITAL ENCOUNTER (OUTPATIENT)
Dept: CARDIAC REHAB | Facility: HOSPITAL | Age: 79
Setting detail: THERAPIES SERIES
End: 2019-04-15
Attending: INTERNAL MEDICINE
Payer: MEDICARE

## 2019-04-15 LAB — GLUCOSE BLDC GLUCOMTR-MCNC: 126 MG/DL (ref 70–99)

## 2019-04-15 PROCEDURE — 93798 PHYS/QHP OP CAR RHAB W/ECG: CPT

## 2019-04-15 PROCEDURE — 40000116 ZZH STATISTIC OP CR VISIT

## 2019-04-15 PROCEDURE — 00000146 ZZHCL STATISTIC GLUCOSE BY METER IP

## 2019-04-17 ENCOUNTER — HOSPITAL ENCOUNTER (OUTPATIENT)
Dept: CARDIAC REHAB | Facility: HOSPITAL | Age: 79
Setting detail: THERAPIES SERIES
End: 2019-04-17
Attending: INTERNAL MEDICINE
Payer: MEDICARE

## 2019-04-17 PROCEDURE — 93798 PHYS/QHP OP CAR RHAB W/ECG: CPT

## 2019-04-17 PROCEDURE — 40000116 ZZH STATISTIC OP CR VISIT

## 2019-04-22 ENCOUNTER — HOSPITAL ENCOUNTER (OUTPATIENT)
Dept: CARDIAC REHAB | Facility: HOSPITAL | Age: 79
Setting detail: THERAPIES SERIES
End: 2019-04-22
Attending: INTERNAL MEDICINE
Payer: MEDICARE

## 2019-04-22 PROCEDURE — 93798 PHYS/QHP OP CAR RHAB W/ECG: CPT

## 2019-04-22 PROCEDURE — 40000116 ZZH STATISTIC OP CR VISIT

## 2019-04-24 ENCOUNTER — HOSPITAL ENCOUNTER (OUTPATIENT)
Dept: CARDIAC REHAB | Facility: HOSPITAL | Age: 79
Setting detail: THERAPIES SERIES
End: 2019-04-24
Attending: FAMILY MEDICINE
Payer: MEDICARE

## 2019-04-24 PROCEDURE — 40000116 ZZH STATISTIC OP CR VISIT

## 2019-04-24 PROCEDURE — 93798 PHYS/QHP OP CAR RHAB W/ECG: CPT

## 2019-05-01 ENCOUNTER — HOSPITAL ENCOUNTER (OUTPATIENT)
Dept: CARDIAC REHAB | Facility: HOSPITAL | Age: 79
Setting detail: THERAPIES SERIES
End: 2019-05-01
Attending: INTERNAL MEDICINE
Payer: MEDICARE

## 2019-05-01 PROCEDURE — 40000116 ZZH STATISTIC OP CR VISIT

## 2019-05-01 PROCEDURE — 93798 PHYS/QHP OP CAR RHAB W/ECG: CPT

## 2019-05-03 ENCOUNTER — HOSPITAL ENCOUNTER (OUTPATIENT)
Dept: CARDIAC REHAB | Facility: HOSPITAL | Age: 79
Setting detail: THERAPIES SERIES
End: 2019-05-03
Attending: INTERNAL MEDICINE
Payer: MEDICARE

## 2019-05-03 PROCEDURE — 40000116 ZZH STATISTIC OP CR VISIT

## 2019-05-03 PROCEDURE — 93798 PHYS/QHP OP CAR RHAB W/ECG: CPT

## 2019-05-06 ENCOUNTER — HOSPITAL ENCOUNTER (OUTPATIENT)
Dept: CARDIAC REHAB | Facility: HOSPITAL | Age: 79
Setting detail: THERAPIES SERIES
End: 2019-05-06
Attending: INTERNAL MEDICINE
Payer: MEDICARE

## 2019-05-06 PROCEDURE — 40000116 ZZH STATISTIC OP CR VISIT

## 2019-05-06 PROCEDURE — 93798 PHYS/QHP OP CAR RHAB W/ECG: CPT

## 2019-05-07 VITALS — HEIGHT: 65 IN | WEIGHT: 162 LBS | BODY MASS INDEX: 26.99 KG/M2

## 2019-05-07 ASSESSMENT — MIFFLIN-ST. JEOR: SCORE: 1366.71

## 2019-05-07 ASSESSMENT — 6 MINUTE WALK TEST (6MWT)
PREDICTED: 1467.67
MALE CALC: 1458.77
GENDER SELECTION: MALE
FEMALE CALC: 1393.32
TOTAL DISTANCE WALKED (FT): 415

## 2019-05-07 NOTE — PROGRESS NOTES
05/07/19 1000   Session   Session 30 Day Individualized Treatment Plan   Certified through this date 06/05/19   Cardiac Rehab Assessment   Cardiac Rehab Assessment 5/7: Bautista has completed eight exercise sessions so far in Phase II Cardiac Rehab. Overall, he is doing well. He is exercising at up to 2.6 MET's and has been advancing his his MET level over time. His workloads are adjusted to accommodate any issues he may have with pain in his back and feet.    Skilled therapy is necessary in order to monitor CV response to exercise, to provide education on risk factors and behavior change counseling needed to achieve patient's goals.   General Information   Treatment Diagnosis NSTEMI   Date of Treatment Diagnosis 12/24/18   Secondary Treatment Diagnosis Stent   Significant Past CV History History of Heart Failure;PAD   Comorbidities PAD   Other Medical History Patient has degenerative disc, arthritis, bunion in left foot, history of depressioin, carotid artery stenosis with endocardiectomy, Raynaud's syndrome, as well as prostrate concerns.    Lead up symptoms Patient did not feel bad. His daughter and friend thought he did not look well and brought him in to urgent care. He was then transferred to [facility].    Hospital Location [facility] Discharge Date 12/28/19   Signs and Symptoms Post Hospital Discharge None   Outpatient Cardiac Rehab Start Date 04/09/19   Primary Physician Reymundo   Primary Physician Follow Up Completed   Surgeon Jerald   Surgeon Follow Up Completed   Cardiologist Marek   Cardiologist Follow Up Completed   Ejection Fraction 32%   Risk Stratification High   Summary of Cath Report   Summary of Cath Report Available   Date Performed 12/24/19   Left Main Left main artery is normal caliber and length. There is no disease present. There are 2  moderate caliber diagonal branches which arise following the severe stenosis in the proximal LAD, there is no significant disease  in the side branch arteries.    LAD The left antrior descending artery is normal caliber. There is a 95% critical stenosis in ht e proximal vessel.    LCX The circumflex is a moderate caliber vessel. The vessel has a 100% chronic total occlusioini in the proximal vessel The distal vessel and 2nd OM ffill via left to left collaterals.   OM The 1st obtuse marginal artery is moderate caliber and has no disease, this vessel takes off before the circumflex occlusion.    RCA The RCA is normal caliber. The vessel is calcified. There is a 100% chronic total occlusion in the distal vessel. The right posterior descending artery and right posterolateral vessels fill via left to right collaterals.    Cath Report Comments Patient was not a candidate for bypass surgery and had JUAN MANUEL X 2 to LAD.    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 No   Have you fallen and had an injury in the past year? No   Is patient a fall risk? No   Fall screen comments 5/7: Patient uses a cane with a folding seat in case he needs to stop and rest.    Abuse Screen (yes response referral indicated)   Feels Unsafe at Home or Work/School no   Feels Threatened by Someone no   Does Anyone Try to Keep You From Having Contact with Others or Doing Things Outside Your Home? no   Physical Signs of Abuse Present no   Pain   Patient Currently in Pain Yes   Pain Location back    Pain Rating 4   Pain Description Ache   Pain Description Comment Patient has chronic back pain while he is walking.    Additional Pain Locations? Pain location 2   Pain Location 2  "foot   Pain Rating 2 4   Pain Comments 5/7: Patient has not complained of pain, nor has he been limited in exercise by pain.    Physical Assessments   Incisions Not assessed   Edema Not assessed   Right Lung Sounds not assessed   Left Lung Sounds not assessed   Limitations No limitations   Comments 5/7: Patient has not been limited by his pain.    Individualized Treatment Plan   Monitored Sessions Scheduled 24   Monitored Sessions Attended 8   Oxygen   Supplemental Oxygen needed Yes   Oxygen liters at rest 2   Oxygen Usage Comment 5/7: Patient has not required O2 during exercise in CR.    Nutrition Management - Weight Management   Assessment Re-assessment   Age 71   Weight 73.5 kg (162 lb)   Height 1.651 m (5' 5\")   BMI (Calculated) 26.96   Goal Weight 74.8 kg (165 lb)   Initial Rate Your Plate Score. Dietary tool to assess eating patterns. Scores range from 24 to 72. The higher the score the healthier the eating pattern. 55   Weight Management Comments 5/7: Patient would like to gain a little weight.    Nutrition Management - Lipids   Lipids Labs Not Available   Prescribed Lipid Medication Yes   Statin Intensity High Intensity   Lipid Comments 5/7: Patient takes medications as prescribed. The pharmacy has his pills pre-packaged for him for each day.    Nutrition Management - Diabetes   Diabetes No   Nutrition Management Summary   Dietary Recommendations Low Fat;Low Cholesterol;Low Sodium   Stages of Change for Diet Compliance Preparation   Interventions Planned Attend Nutrition Education Class(es)   Nutrition Summary Comments 5/7: Patient's daughter lives in an apartment in his home. She cooks meals for him.    Nutrition Target Outcome Total Chol < 150, HDL > 40 (M), HDL > 50 (W), LDL < 70, Trig < 150   Psychosocial Management   Psychosocial Assessment Re-assessment   Is there history of clinical depression or increased risk of depression? History of clinical depression   Current Level of Stress per Patient Report " Moderate    Current Coping Skills Uses Stress Management/Relaxation Techniques   Initial Patient Health Questionnaire -9 Score (PHQ-9) for depression. 5-9 Minimal symptoms, 10-14 Minor depression, 15-19 Major depression, moderately severe, > 20 Major depression, severe  5   Initial [facility] Survey score.  Quality of Life:   If total score > 25 review individual areas where patient rated a 4 or 5.  Consider patients current medical condition and what role that plays on the score.   Adjust treatment protocol to improve areas of concern.  Consider the following:  PHQ9 score, DASI, and re-assessment within the next 30 days to assist with developing treatments.  26   Stages of Change Preparation   Psychosocial Comments 5/7: Patient is not interested in setting goals. Patient wishes to get up off of his butt to feel better, but his physical limitations prevent this, which frustrates him further.    Psychosocial Target Outcome Maximize coping skills   Other Core Components - Hypertension   History of or Diagnosis of Hypertension Yes   Currently taking Anti-Hypertensives Yes;Beta blocker;Ace Inhibitor   Hypertension Comments 5/7: Patient has medications set up for him at pharmacy.    Other Core Components - Tobacco   History of Tobacco Use Yes   Tobacco Use Status Former (Quit > 6 mo ago)   Tobacco Habit Cigarettes   Tobacco Use per Day (average) 1   Years of Tobacco Use 40   Stages of Change Maintenance   Tobacco Comments 5/7: Patient has an occassional urge to smoke, he does not act on. He states it has been many years since he smoked.    Other Core Components Summary   Interventions Planned Instruct patient on the DASH diet;Educate on importance of maintaining low sodium diet   Other Core Components Comments Patient states he quit drinking many years ago as well.  Staff will follow up with patient concerning his CHF and oxygen usage at home.    Other Core Components Target Outcome Compliance with Diet, Medications  and Symptom Management to allow for stable Heart Failure   Activity/Exercise History   Activity/Exercise Assessment Re-assessment   Activity/Exercise Status prior to event? Sedentary   Current Stage of Change (Physical Activity) Preparation   Current Stage of Change (Aerobic Exercise) Preparation   Patient Goals Goal #1   Goal #1 Description Patient would like to be able to rake leaves and shovel snow by increasing strength and endurance.    Goal #1 Target Date 06/25/19   Activity/Exercise Comments 5/7: Patient has been participating in CR regularly.    Activity/Exercise Target Outcome An Accumulation of 150  Minutes of Aerobic Activity per Week   Exercise Assessment   6 Minute Walk Predicted - Gender Selection Male   6 Minute Walk Predicted (Male) 1458.77   6 Minute Walk Predicted (Female) 1393.32   Initial 6 Minute Walk Distance (Feet) 415 ft   Resting HR 73 bpm   Exercise HR 86 bpm   Post Exercise HR 71 bpm   Resting /64   Exercise /58   Post Exercise /56   Effort Rating 2-4   Current MET Level 2.6   MET Level Goal 3   ECG Rhythm Sinus rhythm   Current Symptoms Fatigue   Limitations/Restrictions None   Exercise Prescription   Mode Treadmill;Nustep;Recumbant bike;Arm Ergometer   Duration/Time 30-45 min   Frequency 3 daysweek   THR (85% of age predicted max HR) 124.95   OMNI Effort Rating (0-10 Scale) 4-6/10   Progression Aerobic exercise to OMNI rating of 6 or below and at or below THR;Progress peak intensity by 1/4 MET per week;Intermittent bouts   Comments 5/7: Patient would like to increase his exercise abilities.    Recommended Home Exercise   Type of Exercise Walking   Frequency (days per week) 3-4   Duration (minutes per session) 15-30 min   Effort Rating Recommended 4-6/10   30 Day Exercise Plan 5/7: Patient has not yet started his home exercise program.    Current Home Exercise   Type of Exercise None   Follow-up/On-going Support   Provider follow-up needed on the following No follow-up  needed   Learning Assessment   Learner Patient   Primary Language English   Preferred Learning Style Demonstration;Pictures/Video   Barriers to Learning No barriers noted   Patient Education   Education recommended Anatomy and Physiology of the Heart;Blood Pressure;Exercise Principles;Heart Failure;Medication Overview;Muscle Conditioning;Nutrition;Risk Factors;Stress Management   Education classes attended Patient Declined/Refused All Education   Education Comments 5/7: Patient is aware of education sessions, but chooses not to attend.   Physician cosignature/electronic signature indicates approval of this ITP document. I have established, reviewed and made necessary changes to the individualized treatment plan and exercise prescription for this patient.

## 2019-05-08 ENCOUNTER — HOSPITAL ENCOUNTER (OUTPATIENT)
Dept: CARDIAC REHAB | Facility: HOSPITAL | Age: 79
Setting detail: THERAPIES SERIES
End: 2019-05-08
Attending: INTERNAL MEDICINE
Payer: MEDICARE

## 2019-05-08 PROCEDURE — 40000116 ZZH STATISTIC OP CR VISIT

## 2019-05-08 PROCEDURE — 93798 PHYS/QHP OP CAR RHAB W/ECG: CPT

## 2019-05-10 ENCOUNTER — HOSPITAL ENCOUNTER (OUTPATIENT)
Dept: CARDIAC REHAB | Facility: HOSPITAL | Age: 79
Setting detail: THERAPIES SERIES
End: 2019-05-10
Attending: INTERNAL MEDICINE
Payer: MEDICARE

## 2019-05-10 PROCEDURE — 40000116 ZZH STATISTIC OP CR VISIT

## 2019-05-10 PROCEDURE — 93798 PHYS/QHP OP CAR RHAB W/ECG: CPT

## 2019-05-13 ENCOUNTER — HOSPITAL ENCOUNTER (OUTPATIENT)
Dept: CARDIAC REHAB | Facility: HOSPITAL | Age: 79
Setting detail: THERAPIES SERIES
End: 2019-05-13
Attending: INTERNAL MEDICINE
Payer: MEDICARE

## 2019-05-13 PROCEDURE — 93798 PHYS/QHP OP CAR RHAB W/ECG: CPT

## 2019-05-13 PROCEDURE — 40000116 ZZH STATISTIC OP CR VISIT

## 2019-05-17 ENCOUNTER — HOSPITAL ENCOUNTER (OUTPATIENT)
Dept: CARDIAC REHAB | Facility: HOSPITAL | Age: 79
Setting detail: THERAPIES SERIES
End: 2019-05-17
Attending: INTERNAL MEDICINE
Payer: MEDICARE

## 2019-05-17 PROCEDURE — 40000116 ZZH STATISTIC OP CR VISIT

## 2019-05-17 PROCEDURE — 93798 PHYS/QHP OP CAR RHAB W/ECG: CPT

## 2019-05-24 ENCOUNTER — HOSPITAL ENCOUNTER (OUTPATIENT)
Dept: CARDIAC REHAB | Facility: HOSPITAL | Age: 79
Setting detail: THERAPIES SERIES
End: 2019-05-24
Attending: INTERNAL MEDICINE
Payer: MEDICARE

## 2019-05-24 PROCEDURE — 93798 PHYS/QHP OP CAR RHAB W/ECG: CPT

## 2019-05-24 PROCEDURE — 40000116 ZZH STATISTIC OP CR VISIT

## 2019-06-03 ENCOUNTER — HOSPITAL ENCOUNTER (OUTPATIENT)
Dept: CARDIAC REHAB | Facility: HOSPITAL | Age: 79
Setting detail: THERAPIES SERIES
End: 2019-06-03
Attending: INTERNAL MEDICINE
Payer: MEDICARE

## 2019-06-03 PROCEDURE — 93798 PHYS/QHP OP CAR RHAB W/ECG: CPT

## 2019-06-03 PROCEDURE — 40000116 ZZH STATISTIC OP CR VISIT

## 2019-06-04 VITALS — BODY MASS INDEX: 26.99 KG/M2 | HEIGHT: 65 IN | WEIGHT: 162 LBS

## 2019-06-04 ASSESSMENT — 6 MINUTE WALK TEST (6MWT)
GENDER SELECTION: MALE
TOTAL DISTANCE WALKED (FT): 415
MALE CALC: 1458.77
FEMALE CALC: 1393.32
PREDICTED: 1467.67

## 2019-06-04 ASSESSMENT — MIFFLIN-ST. JEOR: SCORE: 1366.71

## 2019-06-04 NOTE — PROGRESS NOTES
"   06/04/19 0900   Session   Session 60 Day Individualized Treatment Plan   Certified through this date 07/03/19   Cardiac Rehab Assessment   Cardiac Rehab Assessment 6/4: Pt completes 14 sessions of Phase II rehab thus far: 14 exercise sessions and 0 educational sessions. Overall, pt is doing well in the program and achieves MET level of 2.8 without event. Staff will continue to increase exercise modalities as pt tolerates based on the OMNI Effort Scale (4-6/10). Thus far pt has been attending aerobic exercises 3d/week but occasionally has cancelled due to increased fatigue or illness. Pt calls staff promptly to cancel any sessions to which is greatly appreciated by cardiac rehab.     Since staring Phase II, pt has been able to stop ambulating with their personal assistive device comprised of a cane/seat. This is an enormous accomplishment for pt as they state \"[their] legs have become stronger\" since they started exercising. Staff will continue to monitor pt as they ambulate but overall pt is walking with more ease.     Pt has decided not to attend educational sessions offered by rehab but staff will provided material if pt requests regarding the topics discussed. Pt is aware to contact staff if questions arise regarding their health.   General Information   Treatment Diagnosis NSTEMI   Date of Treatment Diagnosis 12/24/18   Secondary Treatment Diagnosis Stent   Significant Past CV History History of Heart Failure;PAD   Comorbidities PAD   Other Medical History Patient has degenerative disc, arthritis, bunion in left foot, history of depressioin, carotid artery stenosis with endocardiectomy, Raynaud's syndrome, as well as prostrate concerns.    Lead up symptoms Patient did not feel bad. His daughter and friend thought he did not look well and brought him in to urgent care. He was then transferred to [facility].    Hospital Location [facility] Discharge Date 12/28/19   Signs and Symptoms Post " Hospital Discharge None   Outpatient Cardiac Rehab Start Date 04/09/19   Primary Physician Reymundo   Primary Physician Follow Up Completed   Surgeon Jerald   Surgeon Follow Up Completed   Cardiologist Marek   Cardiologist Follow Up Completed   Ejection Fraction 32%   Risk Stratification High   Summary of Cath Report   Summary of Cath Report Available   Date Performed 12/24/19   Left Main Left main artery is normal caliber and length. There is no disease present. There are 2  moderate caliber diagonal branches which arise following the severe stenosis in the proximal LAD, there is no significant disease in the side branch arteries.    LAD The left antrior descending artery is normal caliber. There is a 95% critical stenosis in ht e proximal vessel.    LCX The circumflex is a moderate caliber vessel. The vessel has a 100% chronic total occlusioini in the proximal vessel The distal vessel and 2nd OM ffill via left to left collaterals.   OM The 1st obtuse marginal artery is moderate caliber and has no disease, this vessel takes off before the circumflex occlusion.    RCA The RCA is normal caliber. The vessel is calcified. There is a 100% chronic total occlusion in the distal vessel. The right posterior descending artery and right posterolateral vessels fill via left to right collaterals.    Cath Report Comments Patient was not a candidate for bypass surgery and had JUAN MANUEL X 2 to LAD.    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 No   Have you fallen and had an injury in the past year? No   Timed Up and  "Go score (seconds) NA   Is patient a fall risk? No   Fall screen comments 6/4: Since starting Phase II rehab pt has not needed to use their cane with built in seat. Pt is confident with their walking ability.    Abuse Screen (yes response referral indicated)   Feels Unsafe at Home or Work/School no   Feels Threatened by Someone no   Does Anyone Try to Keep You From Having Contact with Others or Doing Things Outside Your Home? no   Physical Signs of Abuse Present no   Pain   Patient Currently in Pain Yes   Pain Location back    Pain Rating 4   Pain Description Ache   Pain Description Comment Patient has chronic back pain while he is walking.    Additional Pain Locations? Pain location 2   Pain Location 2 foot   Pain Rating 2 4   Pain Comments 6/4: Pt has not complained of any pain since staring Phase II but staff is aware of the chronic pain experienced by pt.    Physical Assessments   Incisions Not assessed   Edema Not assessed   Right Lung Sounds not assessed   Left Lung Sounds not assessed   Limitations No limitations   Comments 6/4: Pt does not have any restrictions regarding exercise capability. Staff will continue to monitor and assess pt as they attend rehab.    Individualized Treatment Plan   Monitored Sessions Scheduled 24   Monitored Sessions Attended 14   Oxygen   Supplemental Oxygen needed No   Oxygen Usage Comment 6/4: Pt has oxygen available but has not been using it in the recent months as their sats have been stable. Staff will continue to monitor occasionally,    Nutrition Management - Weight Management   Assessment Re-assessment   Age 71   Weight 73.5 kg (162 lb)   Height 1.651 m (5' 5\")   BMI (Calculated) 26.96   Goal Weight 74.8 kg (165 lb)   Initial Rate Your Plate Score. Dietary tool to assess eating patterns. Scores range from 24 to 72. The higher the score the healthier the eating pattern. 55   Weight Management Comments 6/4: Pt would like to increase their weight above their initial cardiac " rehab weights,    Nutrition Management - Lipids   Lipids Labs Not Available   Prescribed Lipid Medication Yes   Statin Intensity High Intensity   Lipid Comments 5/7: Patient takes medications as prescribed; pharmacy has pills pre-packaged for him for each day.    Nutrition Management - Diabetes   Diabetes No   Nutrition Management Summary   Dietary Recommendations Low Fat;Low Cholesterol;Low Sodium   Stages of Change for Diet Compliance Preparation   Interventions Planned Attend Nutrition Education Class(es)   Nutrition Summary Comments 6/4: Pt's daughter lives with them and prepares most of the meals consumed by pt. Pt is not interested to attend educational sessions.   Nutrition Target Outcome Total Chol < 150, HDL > 40 (M), HDL > 50 (W), LDL < 70, Trig < 150   Psychosocial Management   Psychosocial Assessment Re-assessment   Is there history of clinical depression or increased risk of depression? History of clinical depression   Current Level of Stress per Patient Report Moderate    Current Coping Skills Uses Stress Management/Relaxation Techniques   Initial Patient Health Questionnaire -9 Score (PHQ-9) for depression. 5-9 Minimal symptoms, 10-14 Minor depression, 15-19 Major depression, moderately severe, > 20 Major depression, severe  5   Initial [facility] Survey score.  Quality of Life:   If total score > 25 review individual areas where patient rated a 4 or 5.  Consider patients current medical condition and what role that plays on the score.   Adjust treatment protocol to improve areas of concern.  Consider the following:  PHQ9 score, DASI, and re-assessment within the next 30 days to assist with developing treatments.  26   Stages of Change Preparation   Psychosocial Comments 6/4: Patient is not interested in setting goals. Patient wishes to get up off of his butt to feel better, but his physical limitations prevent this, which frustrates him further.    Psychosocial Target Outcome Maximize coping  skills   Other Core Components - Hypertension   History of or Diagnosis of Hypertension Yes   Currently taking Anti-Hypertensives Yes;Beta blocker;Ace Inhibitor   Hypertension Comments 6/4: Patient has medications set up for him at pharmacy.    Other Core Components - Tobacco   History of Tobacco Use Yes   Tobacco Use Status Former (Quit > 6 mo ago)   Tobacco Habit Cigarettes   Tobacco Use per Day (average) 1   Years of Tobacco Use 40   Stages of Change Maintenance   Tobacco Comments 6/4: Patient has an occassional urge to smoke, he does not act on. He states it has been many years since he smoked.    Other Core Components Summary   Interventions Planned Instruct patient on the DASH diet;Educate on importance of maintaining low sodium diet   Patient Goals No   Other Core Components Comments 6/4: Staff will continue to monitor and assess changes pt experiences during rehab sessions.    Other Core Components Target Outcome BP < 140/90 or < 130/80 with DM or CKD   Activity/Exercise History   Activity/Exercise Assessment Re-assessment   Activity/Exercise Status prior to event? Sedentary   Number of Days Currently participating in Moderate Physical Activity? 3   Number of Days Currently performing  Aerobic Exercise (including rehab)? 3   Number of Minutes per Session Currently of Aerobic Exercise (average)? 37   Current Stage of Change (Physical Activity) Preparation   Current Stage of Change (Aerobic Exercise) Preparation   Patient Goals Goal #1   Goal #1 Description Patient would like to be able to rake leaves and shovel snow by increasing strength and endurance.    Goal #1 Target Date 06/25/19   Activity/Exercise Comments 6/4: Patient has been participating in CR regularly.    Activity/Exercise Target Outcome An Accumulation of 150  Minutes of Aerobic Activity per Week   Exercise Assessment   6 Minute Walk Predicted - Gender Selection Male   6 Minute Walk Predicted (Male) 1458.77   6 Minute Walk Predicted (Female)  1393.32   Initial 6 Minute Walk Distance (Feet) 415 ft   Resting HR 62 bpm   Exercise HR 82 bpm   Post Exercise HR 68 bpm   Resting /66   Exercise /62   Post Exercise /68   Effort Rating 4-5   Current MET Level 2.6   MET Level Goal 3   ECG Rhythm Sinus rhythm   Ectopy PVCs   Current Symptoms Fatigue   Limitations/Restrictions None   Exercise Prescription   Mode Treadmill;Nustep;Recumbant bike;Arm Ergometer   Duration/Time 30-45 min   Frequency 3 daysweek   THR (85% of age predicted max HR) 124.95   OMNI Effort Rating (0-10 Scale) 4-6/10   Progression Aerobic exercise to OMNI rating of 6 or below and at or below THR;Progress peak intensity by 1/4 MET per week;Intermittent bouts   Comments 6/4: Patient would like to increase his exercise abilities.    Recommended Home Exercise   Type of Exercise Walking   Frequency (days per week) 3-4   Duration (minutes per session) 15-30 min   Effort Rating Recommended 4-6/10   30 Day Exercise Plan 6/4: Patient has not yet started his home exercise program.    Current Home Exercise   Type of Exercise None   Frequency (days per week) 0   Duration (minutes per session) 0   Follow-up/On-going Support   Provider follow-up needed on the following No follow-up needed   Learning Assessment   Learner Patient   Primary Language English   Preferred Learning Style Demonstration;Pictures/Video   Barriers to Learning No barriers noted   Patient Education   Education recommended Anatomy and Physiology of the Heart;Blood Pressure;Exercise Principles;Heart Failure;Medication Overview;Muscle Conditioning;Nutrition;Risk Factors;Stress Management   Education classes attended Patient Declined/Refused All Education   Education Comments 6/4: Patient is aware of education sessions, but chooses not to attend.   Physician cosignature/electronic signature indicates approval of this ITP document. I have established, reviewed and made necessary changes to the individualized treatment plan and  exercise prescription for this patient.

## 2019-06-07 ENCOUNTER — HOSPITAL ENCOUNTER (OUTPATIENT)
Dept: CARDIAC REHAB | Facility: HOSPITAL | Age: 79
Setting detail: THERAPIES SERIES
End: 2019-06-07
Attending: FAMILY MEDICINE
Payer: MEDICARE

## 2019-06-07 PROCEDURE — 93798 PHYS/QHP OP CAR RHAB W/ECG: CPT

## 2019-06-07 PROCEDURE — 40000116 ZZH STATISTIC OP CR VISIT

## 2019-06-14 ENCOUNTER — HOSPITAL ENCOUNTER (OUTPATIENT)
Dept: CARDIAC REHAB | Facility: HOSPITAL | Age: 79
Setting detail: THERAPIES SERIES
End: 2019-06-14
Attending: FAMILY MEDICINE
Payer: MEDICARE

## 2019-06-14 PROCEDURE — 40000116 ZZH STATISTIC OP CR VISIT

## 2019-06-14 PROCEDURE — 93798 PHYS/QHP OP CAR RHAB W/ECG: CPT

## 2019-06-21 ENCOUNTER — HOSPITAL ENCOUNTER (OUTPATIENT)
Dept: CARDIAC REHAB | Facility: HOSPITAL | Age: 79
Setting detail: THERAPIES SERIES
End: 2019-06-21
Attending: FAMILY MEDICINE
Payer: MEDICARE

## 2019-06-21 PROCEDURE — 93798 PHYS/QHP OP CAR RHAB W/ECG: CPT

## 2019-06-21 PROCEDURE — 40000116 ZZH STATISTIC OP CR VISIT

## 2019-06-26 ENCOUNTER — HOSPITAL ENCOUNTER (OUTPATIENT)
Dept: CARDIAC REHAB | Facility: HOSPITAL | Age: 79
Setting detail: THERAPIES SERIES
End: 2019-06-26
Attending: FAMILY MEDICINE
Payer: MEDICARE

## 2019-06-26 VITALS — HEIGHT: 65 IN | BODY MASS INDEX: 27.32 KG/M2 | WEIGHT: 164 LBS

## 2019-06-26 PROCEDURE — 93798 PHYS/QHP OP CAR RHAB W/ECG: CPT

## 2019-06-26 PROCEDURE — 40000116 ZZH STATISTIC OP CR VISIT

## 2019-06-26 ASSESSMENT — 6 MINUTE WALK TEST (6MWT)
MALE CALC: 1321.8
GENDER SELECTION: MALE
FEMALE CALC: 1234.83
PREDICTED: 1329.86
TOTAL DISTANCE WALKED (FT): 140
TOTAL DISTANCE WALKED (FT): 415

## 2019-06-26 ASSESSMENT — MIFFLIN-ST. JEOR: SCORE: 1375.78

## 2019-06-26 NOTE — PROGRESS NOTES
06/26/19 1000   Session   Session Discharge Note   Certified through this date 06/26/19   Cardiac Rehab Assessment   Cardiac Rehab Assessment 6/26: Antonio would like to finish his Phase II Cardiac Rehab today as it has been difficult for him to attend. He is frustrated by the pain mostly with his bunion that has limited his activity level and attendance. He will continue to exercise on his stationary bike at home. He states he uses his bike three times a day for approximately 5 or 10 minutes each time. He has not been using his treadmill as often as this has been more painful.   Pt made significant gains in exercise tolerance. Initially patient tolerated 7 minutes at 1.6 METs, now tolerating 43 minutes at 2.6 METs intermittently. Patient's 6-minute walk test decreased by 275 feet due to his bunion pain. The PT was given instructions on frequency, intensity, and duration for continued exercise as well as muscle conditioning and stretching exercises.  Your PT plans to use his home exercise equipment.      General Information   Treatment Diagnosis NSTEMI   Date of Treatment Diagnosis 12/24/18   Secondary Treatment Diagnosis Stent   Significant Past CV History History of Heart Failure;PAD   Comorbidities PAD   Other Medical History Patient has degenerative disc, arthritis, bunion in left foot, history of depressioin, carotid artery stenosis with endocardiectomy, Raynaud's syndrome, as well as prostrate concerns.    Lead up symptoms Patient did not feel bad. His daughter and friend thought he did not look well and brought him in to urgent care. He was then transferred to [facility].    Hospital Location [facility] Discharge Date 12/28/19   Signs and Symptoms Post Hospital Discharge None   Comments 6/26: Patient has suffered from bunion pain and continued back pain since starting CR, limiting his ability to attend.    Outpatient Cardiac Rehab Start Date 04/09/19   Primary Physician Reymundo Navarro  Physician Follow Up Completed   Surgeon Jerald   Surgeon Follow Up Completed   Cardiologist Marek   Cardiologist Follow Up Completed   Ejection Fraction 32%   Risk Stratification High   Summary of Cath Report   Summary of Cath Report Available   Date Performed 12/24/19   Left Main Left main artery is normal caliber and length. There is no disease present. There are 2  moderate caliber diagonal branches which arise following the severe stenosis in the proximal LAD, there is no significant disease in the side branch arteries.    LAD The left antrior descending artery is normal caliber. There is a 95% critical stenosis in ht e proximal vessel.    LCX The circumflex is a moderate caliber vessel. The vessel has a 100% chronic total occlusioini in the proximal vessel The distal vessel and 2nd OM ffill via left to left collaterals.   OM The 1st obtuse marginal artery is moderate caliber and has no disease, this vessel takes off before the circumflex occlusion.    RCA The RCA is normal caliber. The vessel is calcified. There is a 100% chronic total occlusion in the distal vessel. The right posterior descending artery and right posterolateral vessels fill via left to right collaterals.    Cath Report Comments Patient was not a candidate for bypass surgery and had JUAN MANUEL X 2 to LAD.    Living and Work Status    Living Arrangements and Social Status house   Support System Live with an adult   Return to Employment Retired   Occupation [employer], worked for [employer]   Preventative Medications   CMS recommended medications Antiplatelets;Beta Blocker;Influenza vaccination;Pneumonia vaccination;Lipid Lowering;Ace inhibitors   Fall Risk Screen   Fall screen completed by Cardiac Rehab   Have you fallen 2 or more times in the past year? No   Have you fallen and had an injury in the past year? No   Timed Up and Go score (seconds) NA   Is patient a fall risk? No   Fall screen comments 6/262: Patient has not had any  "recent falls.    Abuse Screen (yes response referral indicated)   Feels Unsafe at Home or Work/School no   Feels Threatened by Someone no   Does Anyone Try to Keep You From Having Contact with Others or Doing Things Outside Your Home? no   Physical Signs of Abuse Present no   Pain   Patient Currently in Pain Yes   Pain Location back    Pain Rating 4   Pain Description Ache   Pain Description Comment Patient has chronic back pain while he is walking.    Additional Pain Locations? Pain location 2   Pain Location 2 foot   Pain Rating 2 4   Pain Comments 6/26: Patient's attendance in Cardiac Rehab has been limited by his pain.    Physical Assessments   Incisions Not assessed   Edema Not assessed   Right Lung Sounds not assessed   Left Lung Sounds not assessed   Limitations Other (see comments)  (back pain and bunion pain)   Comments 6/26: Patient attended CR when he was able.    Individualized Treatment Plan   Monitored Sessions Scheduled 24   Monitored Sessions Attended 18   Oxygen   Supplemental Oxygen needed No   Oxygen Usage Comment 6/26: Patient has not required oxygen during CR.    Nutrition Management - Weight Management   Assessment Discharge   Age 79   Weight 74.4 kg (164 lb)   Height 1.651 m (5' 5\")   BMI (Calculated) 27.29   Goal Weight 74.8 kg (165 lb)   Initial Rate Your Plate Score. Dietary tool to assess eating patterns. Scores range from 24 to 72. The higher the score the healthier the eating pattern. 55   Discharge Rate Your Plate Score 52   Weight Management Comments 6/26: Patient has made a smalll increase in his weight.    Nutrition Management - Lipids   Lipids Labs Not Available   Prescribed Lipid Medication Yes   Statin Intensity High Intensity   Lipid Comments 6/26: Patient takes medications as prescribed.    Nutrition Management - Diabetes   Diabetes No   Nutrition Management Summary   Dietary Recommendations Low Fat;Low Cholesterol;Low Sodium   Stages of Change for Diet Compliance Preparation "   Interventions Planned Attend Nutrition Education Class(es)   Nutrition Summary Comments 6/26: Patient's [place] [facility] [place] Patient states he is living on his own until fall.    Nutrition Target Outcome Total Chol < 150, HDL > 40 (M), HDL > 50 (W), LDL < 70, Trig < 150   Psychosocial Management   Psychosocial Assessment Discharge   Is there history of clinical depression or increased risk of depression? History of clinical depression   Current Level of Stress per Patient Report Moderate    Current Coping Skills Uses Stress Management/Relaxation Techniques   Initial Patient Health Questionnaire -9 Score (PHQ-9) for depression. 5-9 Minimal symptoms, 10-14 Minor depression, 15-19 Major depression, moderately severe, > 20 Major depression, severe  5   Discharge PHQ-9 Score for Depression 12   Initial [facility] score.  Quality of Life:   If total score > 25 review individual areas where patient rated a 4 or 5.  Consider patients current medical condition and what role that plays on the score.   Adjust treatment protocol to improve areas of concern.  Consider the following:  PHQ9 score, DASI, and re-assessment within the next 30 days to assist with developing treatments.  26   Discharge [facility] Survey Score 30   Stages of Change Contemplation   Psychosocial Comments 6/26: Patient's survey scores have declined due to his other health issues.    Psychosocial Target Outcome Maximize coping skills   Other Core Components - Hypertension   History of or Diagnosis of Hypertension Yes   Currently taking Anti-Hypertensives Yes;Beta blocker;Ace Inhibitor   Hypertension Comments 6/26: Patient has medications set up for him at pharmacy.    Other Core Components - Tobacco   History of Tobacco Use Yes   Tobacco Use Status Former (Quit > 6 mo ago)   Tobacco Habit Cigarettes   Tobacco Use per Day (average) 1   Years of Tobacco Use 40   Stages of Change Maintenance   Tobacco  Comments 6/26: Patient has an occassional urge to smoke, he does not act on. He states it has been many years since he smoked.    Other Core Components Summary   Interventions Planned Instruct patient on the DASH diet;Educate on importance of maintaining low sodium diet   Patient Goals No   Other Core Components Comments 6/26: Patient has had other health issues that have set him back physically. He  is grateful for his time in CR.    Other Core Components Target Outcome BP < 140/90 or < 130/80 with DM or CKD   Activity/Exercise History   Activity/Exercise Assessment Discharge   Activity/Exercise Status prior to event? Was Physically Active   Number of Days Currently participating in Moderate Physical Activity? 7   Number of Days Currently performing  Aerobic Exercise (including rehab)? 7   Number of Minutes per Session Currently of Aerobic Exercise (average)? 37   Current Stage of Change (Physical Activity) Action   Current Stage of Change (Aerobic Exercise) Action   Patient Goals Goal #1   Goal #1 Description Patient would like to be able to rake leaves and shovel snow by increasing strength and endurance.    Goal #1 Target Date 06/25/19   Goal #1 Progress Towards Goal 6/26: Patient has not met his goal, due to other health concerns.    Activity/Exercise Comments 6/26: Patient is using his stationary bike 3 times per day at home.    Activity/Exercise Target Outcome An Accumulation of 150  Minutes of Aerobic Activity per Week   Exercise Assessment   6 Minute Walk Predicted - Gender Selection Male   6 Minute Walk Predicted (Male) 1321.8   6 Minute Walk Predicted (Female) 1234.83   Initial 6 Minute Walk Distance (Feet) 415 ft   Discharge 6 Minute Walk Distance (Feet) 140   Resting HR 66 bpm   Exercise HR 85 bpm   Post Exercise HR 62 bpm   Resting /68   Exercise /64   Post Exercise /68   Pre SpO2 100   While Exercising SpO2 97   Post SpO2 99   Effort Rating 6   Current MET Level 2.6   MET Level Goal  3   ECG Rhythm Sinus rhythm   Ectopy PVCs   Current Symptoms Fatigue;Other (comments)  (Bunion pain. )   Limitations/Restrictions Other (see comments)  (pain in foot and back)   Exercise Prescription   Mode Treadmill;Nustep;Recumbant bike;Arm Ergometer   Duration/Time 30-45 min   Frequency 3 daysweek   THR (85% of age predicted max HR) 118.15   OMNI Effort Rating (0-10 Scale) 4-6/10   Progression Aerobic exercise to OMNI rating of 6 or below and at or below THR;Progress peak intensity by 1/4 MET per week;Intermittent bouts   Comments 6/26: Patient would like to increase his exercise abilities.    Recommended Home Exercise   Type of Exercise Bike;Treadmill   Frequency (days per week) 6-7   Duration (minutes per session) 15-30 min   Effort Rating Recommended 4-6/10   30 Day Exercise Plan 6/26: Patient is using his bike daily, and his treadmill occassionally.    Current Home Exercise   Type of Exercise Bike   Frequency (days per week) 7   Duration (minutes per session) 5-10   Follow-up/On-going Support   Provider follow-up needed on the following No follow-up needed   Learning Assessment   Learner Patient   Primary Language English   Preferred Learning Style Demonstration;Pictures/Video   Barriers to Learning No barriers noted   Patient Education   Education recommended Anatomy and Physiology of the Heart;Blood Pressure;Exercise Principles;Heart Failure;Medication Overview;Muscle Conditioning;Nutrition;Risk Factors;Stress Management   Education classes attended Patient Declined/Refused All Education   Education Comments 6/26: Patient is aware of education sessions, but chooses not to attend.   Physician cosignature/electronic signature indicates approval of this ITP document. I have established, reviewed and made necessary changes to the individualized treatment plan and exercise prescription for this patient.

## 2019-12-12 ENCOUNTER — HOSPITAL ENCOUNTER (EMERGENCY)
Facility: HOSPITAL | Age: 79
Discharge: HOME | End: 2019-12-12
Attending: PHYSICIAN ASSISTANT | Admitting: PHYSICIAN ASSISTANT
Payer: MEDICARE

## 2019-12-12 VITALS
TEMPERATURE: 97 F | OXYGEN SATURATION: 98 % | RESPIRATION RATE: 19 BRPM | SYSTOLIC BLOOD PRESSURE: 121 MMHG | DIASTOLIC BLOOD PRESSURE: 52 MMHG | HEART RATE: 58 BPM

## 2019-12-12 DIAGNOSIS — R42 POSTURAL DIZZINESS: ICD-10-CM

## 2019-12-12 LAB
ALBUMIN SERPL-MCNC: 3.6 G/DL (ref 3.4–5)
ALBUMIN UR-MCNC: NEGATIVE MG/DL
ALP SERPL-CCNC: 89 U/L (ref 40–150)
ALT SERPL W P-5'-P-CCNC: 45 U/L (ref 0–70)
ANION GAP SERPL CALCULATED.3IONS-SCNC: 4 MMOL/L (ref 3–14)
APPEARANCE UR: CLEAR
AST SERPL W P-5'-P-CCNC: 23 U/L (ref 0–45)
BASOPHILS # BLD AUTO: 0 10E9/L (ref 0–0.2)
BASOPHILS NFR BLD AUTO: 0.4 %
BILIRUB SERPL-MCNC: 0.6 MG/DL (ref 0.2–1.3)
BILIRUB UR QL STRIP: NEGATIVE
BUN SERPL-MCNC: 25 MG/DL (ref 7–30)
CALCIUM SERPL-MCNC: 8.7 MG/DL (ref 8.5–10.1)
CHLORIDE SERPL-SCNC: 107 MMOL/L (ref 94–109)
CO2 SERPL-SCNC: 30 MMOL/L (ref 20–32)
COLOR UR AUTO: NORMAL
CREAT SERPL-MCNC: 1.06 MG/DL (ref 0.66–1.25)
DIFFERENTIAL METHOD BLD: ABNORMAL
EOSINOPHIL # BLD AUTO: 0.4 10E9/L (ref 0–0.7)
EOSINOPHIL NFR BLD AUTO: 4.1 %
ERYTHROCYTE [DISTWIDTH] IN BLOOD BY AUTOMATED COUNT: 14.3 % (ref 10–15)
GFR SERPL CREATININE-BSD FRML MDRD: 65 ML/MIN/{1.73_M2}
GLUCOSE SERPL-MCNC: 97 MG/DL (ref 70–99)
GLUCOSE UR STRIP-MCNC: NEGATIVE MG/DL
HCT VFR BLD AUTO: 40.9 % (ref 40–53)
HGB BLD-MCNC: 13.4 G/DL (ref 13.3–17.7)
HGB UR QL STRIP: NEGATIVE
IMM GRANULOCYTES # BLD: 0 10E9/L (ref 0–0.4)
IMM GRANULOCYTES NFR BLD: 0.4 %
KETONES UR STRIP-MCNC: NEGATIVE MG/DL
LEUKOCYTE ESTERASE UR QL STRIP: NEGATIVE
LYMPHOCYTES # BLD AUTO: 2 10E9/L (ref 0.8–5.3)
LYMPHOCYTES NFR BLD AUTO: 23.5 %
MCH RBC QN AUTO: 33.2 PG (ref 26.5–33)
MCHC RBC AUTO-ENTMCNC: 32.8 G/DL (ref 31.5–36.5)
MCV RBC AUTO: 101 FL (ref 78–100)
MONOCYTES # BLD AUTO: 0.7 10E9/L (ref 0–1.3)
MONOCYTES NFR BLD AUTO: 8.6 %
NEUTROPHILS # BLD AUTO: 5.3 10E9/L (ref 1.6–8.3)
NEUTROPHILS NFR BLD AUTO: 63 %
NITRATE UR QL: NEGATIVE
NRBC # BLD AUTO: 0 10*3/UL
NRBC BLD AUTO-RTO: 0 /100
PH UR STRIP: 5 PH (ref 4.7–8)
PLATELET # BLD AUTO: 177 10E9/L (ref 150–450)
POTASSIUM SERPL-SCNC: 4.2 MMOL/L (ref 3.4–5.3)
PROT SERPL-MCNC: 7.5 G/DL (ref 6.8–8.8)
RBC # BLD AUTO: 4.04 10E12/L (ref 4.4–5.9)
SODIUM SERPL-SCNC: 141 MMOL/L (ref 133–144)
SOURCE: NORMAL
SP GR UR STRIP: 1.01 (ref 1–1.03)
TROPONIN I SERPL-MCNC: <0.015 UG/L (ref 0–0.04)
UROBILINOGEN UR STRIP-MCNC: NORMAL MG/DL (ref 0–2)
WBC # BLD AUTO: 8.5 10E9/L (ref 4–11)

## 2019-12-12 PROCEDURE — 99284 EMERGENCY DEPT VISIT MOD MDM: CPT | Mod: 25

## 2019-12-12 PROCEDURE — 81003 URINALYSIS AUTO W/O SCOPE: CPT | Performed by: PHYSICIAN ASSISTANT

## 2019-12-12 PROCEDURE — 25800030 ZZH RX IP 258 OP 636: Performed by: PHYSICIAN ASSISTANT

## 2019-12-12 PROCEDURE — 93005 ELECTROCARDIOGRAM TRACING: CPT

## 2019-12-12 PROCEDURE — 99284 EMERGENCY DEPT VISIT MOD MDM: CPT | Mod: Z6 | Performed by: PHYSICIAN ASSISTANT

## 2019-12-12 PROCEDURE — 96361 HYDRATE IV INFUSION ADD-ON: CPT

## 2019-12-12 PROCEDURE — 80053 COMPREHEN METABOLIC PANEL: CPT | Performed by: PHYSICIAN ASSISTANT

## 2019-12-12 PROCEDURE — 36415 COLL VENOUS BLD VENIPUNCTURE: CPT | Performed by: PHYSICIAN ASSISTANT

## 2019-12-12 PROCEDURE — 96360 HYDRATION IV INFUSION INIT: CPT

## 2019-12-12 PROCEDURE — 85025 COMPLETE CBC W/AUTO DIFF WBC: CPT | Performed by: PHYSICIAN ASSISTANT

## 2019-12-12 PROCEDURE — 84484 ASSAY OF TROPONIN QUANT: CPT | Performed by: PHYSICIAN ASSISTANT

## 2019-12-12 PROCEDURE — 93010 ELECTROCARDIOGRAM REPORT: CPT | Performed by: INTERNAL MEDICINE

## 2019-12-12 RX ORDER — SODIUM CHLORIDE 9 MG/ML
1000 INJECTION, SOLUTION INTRAVENOUS CONTINUOUS
Status: DISCONTINUED | OUTPATIENT
Start: 2019-12-12 | End: 2019-12-12 | Stop reason: HOSPADM

## 2019-12-12 RX ADMIN — SODIUM CHLORIDE 1000 ML: 9 INJECTION, SOLUTION INTRAVENOUS at 16:05

## 2019-12-12 RX ADMIN — SODIUM CHLORIDE 1000 ML: 9 INJECTION, SOLUTION INTRAVENOUS at 17:08

## 2019-12-12 ASSESSMENT — ENCOUNTER SYMPTOMS
FEVER: 0
BLOOD IN STOOL: 0
APPETITE CHANGE: 0
LIGHT-HEADEDNESS: 1
DIARRHEA: 0
DIZZINESS: 1
BRUISES/BLEEDS EASILY: 0
SHORTNESS OF BREATH: 0
ABDOMINAL PAIN: 0
CHEST TIGHTNESS: 0
VOMITING: 0
BACK PAIN: 0
ACTIVITY CHANGE: 0
PALPITATIONS: 0
FATIGUE: 0
NAUSEA: 0

## 2019-12-12 NOTE — DISCHARGE INSTRUCTIONS
Rest and stay hydrated.     Return here for any other concerns.     Follow-up in the clinic next week.

## 2019-12-12 NOTE — ED AVS SNAPSHOT
[facility]  [address] [address]  Phone:  [phone]                                    Antonio Shoemaker   MRN: [id number]    Department:  [facility]   Date of Visit:  12/12/2019           After Visit Summary Signature Page    I have received my discharge instructions, and my questions have been answered. I have discussed any challenges I see with this plan with the nurse or doctor.    ..........................................................................................................................................  Patient/Patient Representative Signature      ..........................................................................................................................................  Patient Representative Print Name and Relationship to Patient    ..................................................               ................................................  Date                                   Time    ..........................................................................................................................................  Reviewed by Signature/Title    ...................................................              ..............................................  Date                                               Time          22EPIC Rev 08/18

## 2019-12-12 NOTE — ED NOTES
"encouraging pt to try to void. States \" I really dont think I can\" requested and received ice water.   "

## 2019-12-12 NOTE — ED PROVIDER NOTES
History     Chief Complaint   Patient presents with     Dizziness     denies headache, and chest pain. dizzy x2-3 hours. alert and oriented. bilateral knee numbness that is new today. pt also sates he has fluid in both of his ears and is unable to clear it     The history is provided by the patient.     Antonio Shoemaker is a 79 year old male who presented to the emergency department ambulatory along with daughter for evaluation of dizziness with position change and ambulation.  Past history is most significant for MI with heart failure approximately 1 year ago.  He was placed on hospice and palliative care after this with discontinuation of hospice but apparent resolution of palliative care.  Denies any chest pains, shortness of breath, headaches, fevers, chills, abdominal discomfort, vomiting, diarrhea, or other concerns.  Reports feeling well at rest.    Allergies:  No Known Allergies    Problem List:    Patient Active Problem List    Diagnosis Date Noted     Heart failure with reduced ejection fraction, NYHA class II (H) 02/14/2019     Priority: Medium     Benign prostatic hyperplasia with urinary retention 06/27/2016     Priority: Medium     History of CVA (cerebrovascular accident) 06/27/2016     Priority: Medium     Status post total left knee replacement 06/27/2016     Priority: Medium     Carotid artery stenosis 09/24/2009     Priority: Medium     Dyslipidemia 07/29/2009     Priority: Medium     Encounter for long-term (current) use of other medications 07/29/2009     Priority: Medium     Incidental lung nodule 07/02/2008     Priority: Medium     Overview:   IMO Update 10/11       CVA (cerebrovascular accident) (H) 06/21/2008     Priority: Medium     Overview:   IMO Update 10/11       Osteoarthritis of knee 03/12/2008     Priority: Medium     Overview:   IMO Update 10/11       Ascites 07/28/2007     Priority: Medium     Overview:   IMO Update          Past Medical History:    No past medical history on  file.    Past Surgical History:    Past Surgical History:   Procedure Laterality Date     COLONOSCOPY  8/12/2013    Procedure: COLONOSCOPY;   Upper Endoscopy w/ biopsies and COLONOSCOPY;  Surgeon: Sheri Joseph DO;  Location: [facility]     NASAL SCOPE,BX/RMV POLYP/DEBRID       ORTHOPEDIC SURGERY      knee     VASCULAR SURGERY      carotid       Family History:    No family history on file.    Social History:  Marital Status:   [5]  Social History     Tobacco Use     Smoking status: Former Smoker     Smokeless tobacco: Never Used   Substance Use Topics     Alcohol use: No     Drug use: No        Medications:    aspirin 81 MG EC tablet  atorvastatin (LIPITOR) 40 MG tablet  Clopidogrel Bisulfate (PLAVIX PO)  finasteride (PROSCAR) 5 MG tablet  fish oil-omega-3 fatty acids 1000 MG capsule  furosemide (LASIX) 20 MG tablet  Garlic 100 MG TABS  latanoprost (XALATAN) 0.005 % ophthalmic solution  lisinopril (PRINIVIL/ZESTRIL) 5 MG tablet  melatonin 3 MG CAPS  metoprolol succinate ER (TOPROL-XL) 50 MG 24 hr tablet  mirtazapine (REMERON) 30 MG tablet  tamsulosin (FLOMAX) 0.4 MG capsule  Turmeric 500 MG TABS          Review of Systems   Constitutional: Negative for activity change, appetite change, fatigue and fever.   Respiratory: Negative for chest tightness and shortness of breath.    Cardiovascular: Negative for chest pain, palpitations and leg swelling.   Gastrointestinal: Negative for abdominal pain, blood in stool, diarrhea, nausea and vomiting.   Musculoskeletal: Negative for back pain.   Skin: Negative.    Neurological: Positive for dizziness and light-headedness.        With position change   Hematological: Does not bruise/bleed easily.       Physical Exam   BP: (!) 82/64  Pulse: 63  Heart Rate: 61  Temp: 96.4  F (35.8  C)  Resp: 16  SpO2: (!) 86 %  Lying Orthostatic BP: 126/66  Lying Orthostatic Pulse: 58 bpm  Sitting Orthostatic BP: 118/62  Sitting Orthostatic Pulse: 59 bpm  Standing Orthostatic BP:  110/57  Standing Orthostatic Pulse: 65 bpm      Physical Exam  Vitals signs and nursing note reviewed.   Constitutional:       General: He is not in acute distress.     Appearance: Normal appearance. He is normal weight. He is not ill-appearing, toxic-appearing or diaphoretic.   Pulmonary:      Effort: Pulmonary effort is normal.   Abdominal:      General: There is no distension.      Palpations: Abdomen is soft.      Tenderness: There is no abdominal tenderness. There is no guarding.   Skin:     Capillary Refill: Capillary refill takes less than 2 seconds.   Neurological:      General: No focal deficit present.      Mental Status: He is alert and oriented to person, place, and time.   Psychiatric:         Mood and Affect: Mood normal.         ED Course        Procedures  EKG shows sinus bradycardia at a rate of 59.  NE interval slightly prolonged at 202 ms.  Normal QRS duration.  Normal QTC.  Normal axis.  No concerning ST segments.  No concerning T waves.  No evidence of ectopy, preexcitation, or ischemia.  Comparison the previous EKG shows no change.             Critical Care time:  none               Results for orders placed or performed during the hospital encounter of 12/12/19 (from the past 24 hour(s))   CBC with platelets differential   Result Value Ref Range    WBC 8.5 4.0 - 11.0 10e9/L    RBC Count 4.04 (L) 4.4 - 5.9 10e12/L    Hemoglobin 13.4 13.3 - 17.7 g/dL    Hematocrit 40.9 40.0 - 53.0 %     (H) 78 - 100 fl    MCH 33.2 (H) 26.5 - 33.0 pg    MCHC 32.8 31.5 - 36.5 g/dL    RDW 14.3 10.0 - 15.0 %    Platelet Count 177 150 - 450 10e9/L    Diff Method Automated Method     % Neutrophils 63.0 %    % Lymphocytes 23.5 %    % Monocytes 8.6 %    % Eosinophils 4.1 %    % Basophils 0.4 %    % Immature Granulocytes 0.4 %    Nucleated RBCs 0 0 /100    Absolute Neutrophil 5.3 1.6 - 8.3 10e9/L    Absolute Lymphocytes 2.0 0.8 - 5.3 10e9/L    Absolute Monocytes 0.7 0.0 - 1.3 10e9/L    Absolute Eosinophils 0.4 0.0  - 0.7 10e9/L    Absolute Basophils 0.0 0.0 - 0.2 10e9/L    Abs Immature Granulocytes 0.0 0 - 0.4 10e9/L    Absolute Nucleated RBC 0.0    Comprehensive metabolic panel   Result Value Ref Range    Sodium 141 133 - 144 mmol/L    Potassium 4.2 3.4 - 5.3 mmol/L    Chloride 107 94 - 109 mmol/L    Carbon Dioxide 30 20 - 32 mmol/L    Anion Gap 4 3 - 14 mmol/L    Glucose 97 70 - 99 mg/dL    Urea Nitrogen 25 7 - 30 mg/dL    Creatinine 1.06 0.66 - 1.25 mg/dL    GFR Estimate 65 >60 mL/min/[1.73_m2]    GFR Estimate If Black 76 >60 mL/min/[1.73_m2]    Calcium 8.7 8.5 - 10.1 mg/dL    Bilirubin Total 0.6 0.2 - 1.3 mg/dL    Albumin 3.6 3.4 - 5.0 g/dL    Protein Total 7.5 6.8 - 8.8 g/dL    Alkaline Phosphatase 89 40 - 150 U/L    ALT 45 0 - 70 U/L    AST 23 0 - 45 U/L   Troponin I   Result Value Ref Range    Troponin I ES <0.015 0.000 - 0.045 ug/L   UA reflex to Microscopic   Result Value Ref Range    Color Urine Light Yellow     Appearance Urine Clear     Glucose Urine Negative NEG^Negative mg/dL    Bilirubin Urine Negative NEG^Negative    Ketones Urine Negative NEG^Negative mg/dL    Specific Gravity Urine 1.014 1.003 - 1.035    Blood Urine Negative NEG^Negative    pH Urine 5.0 4.7 - 8.0 pH    Protein Albumin Urine Negative NEG^Negative mg/dL    Urobilinogen mg/dL Normal 0.0 - 2.0 mg/dL    Nitrite Urine Negative NEG^Negative    Leukocyte Esterase Urine Negative NEG^Negative    Source Midstream Urine        Medications   0.9% sodium chloride BOLUS (0 mLs Intravenous Stopped 12/12/19 1659)     Followed by   sodium chloride 0.9% infusion (0 mLs Intravenous Stopped 12/12/19 1807)       Assessments & Plan (with Medical Decision Making)   Protracted observation with return to baseline.  He had no symptoms at rest.  Only unspecified dizziness with position change.  Single finding of hypertension on arrival.  However, when we got him back to the room, his first blood pressure was completely normal.  The initial hypotension was likely an  anomaly.  He had no questions or concerns whatsoever.  Requested discharge on multiple occasions and this is certainly the most reasonable disposition.  Please see work-up.  Patient is currently on palliative care.  No reasonable or compelling medical indication for further work-up or intervention.  He uses his exercise bike at least 6 times a day.  Continue oxygen as needed.  Return here for any worsening symptoms, new symptoms, or other concerns.    This document was prepared using a combination of typing and voice generated software.  While every attempt was made for accuracy, spelling and grammatical errors may exist.    I have reviewed the nursing notes.    I have reviewed the findings, diagnosis, plan and need for follow up with the patient.       Discharge Medication List as of 12/12/2019  6:07 PM          Final diagnoses:   Postural dizziness       12/12/2019   [facility]     Deborah Cheng PA-C  12/12/19 1916

## 2019-12-12 NOTE — PROGRESS NOTES
Received call from Irish Alvarez on palliative maintenance, nursing and  visits once every three months.   Updated Deborah PATTERSON.

## 2020-01-01 ENCOUNTER — HOSPITAL ENCOUNTER (EMERGENCY)
Facility: HOSPITAL | Age: 80
Discharge: HOME | End: 2020-01-01
Attending: FAMILY MEDICINE | Admitting: FAMILY MEDICINE
Payer: COMMERCIAL

## 2020-01-01 VITALS
RESPIRATION RATE: 16 BRPM | OXYGEN SATURATION: 98 % | DIASTOLIC BLOOD PRESSURE: 53 MMHG | HEART RATE: 55 BPM | TEMPERATURE: 97.4 F | SYSTOLIC BLOOD PRESSURE: 102 MMHG

## 2020-01-01 DIAGNOSIS — Z86.73 HISTORY OF CVA (CEREBROVASCULAR ACCIDENT): ICD-10-CM

## 2020-01-01 DIAGNOSIS — Z86.79 HX OF HEART FAILURE: ICD-10-CM

## 2020-01-01 DIAGNOSIS — M62.81 GENERALIZED MUSCLE WEAKNESS: ICD-10-CM

## 2020-01-01 LAB
ALBUMIN SERPL-MCNC: 3.2 G/DL (ref 3.4–5)
ALP SERPL-CCNC: 83 U/L (ref 40–150)
ALT SERPL W P-5'-P-CCNC: 53 U/L (ref 0–70)
ANION GAP SERPL CALCULATED.3IONS-SCNC: 2 MMOL/L (ref 3–14)
AST SERPL W P-5'-P-CCNC: 33 U/L (ref 0–45)
BASOPHILS # BLD AUTO: 0 10E9/L (ref 0–0.2)
BASOPHILS NFR BLD AUTO: 0.5 %
BILIRUB SERPL-MCNC: 0.5 MG/DL (ref 0.2–1.3)
BUN SERPL-MCNC: 30 MG/DL (ref 7–30)
CALCIUM SERPL-MCNC: 8.9 MG/DL (ref 8.5–10.1)
CHLORIDE SERPL-SCNC: 110 MMOL/L (ref 94–109)
CK SERPL-CCNC: 45 U/L (ref 30–300)
CO2 SERPL-SCNC: 32 MMOL/L (ref 20–32)
CREAT SERPL-MCNC: 1.12 MG/DL (ref 0.66–1.25)
DIFFERENTIAL METHOD BLD: ABNORMAL
EOSINOPHIL # BLD AUTO: 0.5 10E9/L (ref 0–0.7)
EOSINOPHIL NFR BLD AUTO: 5.9 %
ERYTHROCYTE [DISTWIDTH] IN BLOOD BY AUTOMATED COUNT: 14.1 % (ref 10–15)
GFR SERPL CREATININE-BSD FRML MDRD: 61 ML/MIN/{1.73_M2}
GLUCOSE SERPL-MCNC: 104 MG/DL (ref 70–99)
HCT VFR BLD AUTO: 41.1 % (ref 40–53)
HGB BLD-MCNC: 13.6 G/DL (ref 13.3–17.7)
IMM GRANULOCYTES # BLD: 0 10E9/L (ref 0–0.4)
IMM GRANULOCYTES NFR BLD: 0.3 %
LYMPHOCYTES # BLD AUTO: 2.2 10E9/L (ref 0.8–5.3)
LYMPHOCYTES NFR BLD AUTO: 25 %
MCH RBC QN AUTO: 33.3 PG (ref 26.5–33)
MCHC RBC AUTO-ENTMCNC: 33.1 G/DL (ref 31.5–36.5)
MCV RBC AUTO: 101 FL (ref 78–100)
MONOCYTES # BLD AUTO: 0.8 10E9/L (ref 0–1.3)
MONOCYTES NFR BLD AUTO: 9.4 %
NEUTROPHILS # BLD AUTO: 5.1 10E9/L (ref 1.6–8.3)
NEUTROPHILS NFR BLD AUTO: 58.9 %
NRBC # BLD AUTO: 0 10*3/UL
NRBC BLD AUTO-RTO: 0 /100
PLATELET # BLD AUTO: 178 10E9/L (ref 150–450)
POTASSIUM SERPL-SCNC: 4.4 MMOL/L (ref 3.4–5.3)
PROT SERPL-MCNC: 7 G/DL (ref 6.8–8.8)
RBC # BLD AUTO: 4.08 10E12/L (ref 4.4–5.9)
SODIUM SERPL-SCNC: 144 MMOL/L (ref 133–144)
WBC # BLD AUTO: 8.6 10E9/L (ref 4–11)

## 2020-01-01 PROCEDURE — 36415 COLL VENOUS BLD VENIPUNCTURE: CPT | Performed by: FAMILY MEDICINE

## 2020-01-01 PROCEDURE — 99283 EMERGENCY DEPT VISIT LOW MDM: CPT

## 2020-01-01 PROCEDURE — 99284 EMERGENCY DEPT VISIT MOD MDM: CPT | Mod: Z6 | Performed by: FAMILY MEDICINE

## 2020-01-01 PROCEDURE — 82550 ASSAY OF CK (CPK): CPT | Performed by: FAMILY MEDICINE

## 2020-01-01 PROCEDURE — 80053 COMPREHEN METABOLIC PANEL: CPT | Performed by: FAMILY MEDICINE

## 2020-01-01 PROCEDURE — 85025 COMPLETE CBC W/AUTO DIFF WBC: CPT | Performed by: FAMILY MEDICINE

## 2020-01-01 ASSESSMENT — ENCOUNTER SYMPTOMS
FREQUENCY: 0
HEADACHES: 0
PALPITATIONS: 0
FEVER: 0
DYSURIA: 0
CHEST TIGHTNESS: 0
SHORTNESS OF BREATH: 0
NUMBNESS: 1
CONFUSION: 0
DIZZINESS: 0
TROUBLE SWALLOWING: 0
EYE PAIN: 0
BACK PAIN: 1
WHEEZING: 0
VOMITING: 0
HEMATURIA: 0
COLOR CHANGE: 0
WEAKNESS: 1
COUGH: 0
APPETITE CHANGE: 0
VOICE CHANGE: 0
CHILLS: 0
SORE THROAT: 0
DECREASED CONCENTRATION: 0
ABDOMINAL PAIN: 0
DIARRHEA: 0

## 2020-01-01 NOTE — ED AVS SNAPSHOT
[facility]  [address] [address]  Phone:  [phone]                                    Antonio Shoemaker   MRN: [id number]    Department:  [facility]   Date of Visit:  1/1/2020           After Visit Summary Signature Page    I have received my discharge instructions, and my questions have been answered. I have discussed any challenges I see with this plan with the nurse or doctor.    ..........................................................................................................................................  Patient/Patient Representative Signature      ..........................................................................................................................................  Patient Representative Print Name and Relationship to Patient    ..................................................               ................................................  Date                                   Time    ..........................................................................................................................................  Reviewed by Signature/Title    ...................................................              ..............................................  Date                                               Time          22EPIC Rev 08/18

## 2020-01-01 NOTE — DISCHARGE INSTRUCTIONS
Please continue your current medications, call your primary care provider tomorrow morning regarding referral for physical therapy in order to improve strength of your legs and arms.  Return to ED if symptoms worsen or any concerns.

## 2020-01-01 NOTE — ED TRIAGE NOTES
Per patient he has lost the feeling in his lower legs. Sates this is progressive and his primary does know about this happening and even though he's able to walk his legs feel weak.

## 2020-01-01 NOTE — ED PROVIDER NOTES
History     Chief Complaint   Patient presents with     Generalized Weakness     HPI  Antonio Shoemaker is a 79 year old male who presents with his daughter with concern about his generalized weakness which he describes as progressive weakness in his legs and arms associated with numbness sensation in his feet.  Patient is able to hold upright position and ambulates, she also reports he esexercise at his apartment although it does not improve his generalized weakness.  No history of recent fall, today he denies any chest pain or shortness of breath, no abdominal pain or nausea vomiting, he does report has chronic intermittent musculoskeletal back pain.  No fever chills.  He has normal speech, he responds to all questions appropriately, he is not confused.    Allergies:  No Known Allergies    Problem List:    Patient Active Problem List    Diagnosis Date Noted     Heart failure with reduced ejection fraction, NYHA class II (H) 02/14/2019     Priority: Medium     Benign prostatic hyperplasia with urinary retention 06/27/2016     Priority: Medium     History of CVA (cerebrovascular accident) 06/27/2016     Priority: Medium     Status post total left knee replacement 06/27/2016     Priority: Medium     Carotid artery stenosis 09/24/2009     Priority: Medium     Dyslipidemia 07/29/2009     Priority: Medium     Encounter for long-term (current) use of other medications 07/29/2009     Priority: Medium     Incidental lung nodule 07/02/2008     Priority: Medium     Overview:   IMO Update 10/11       CVA (cerebrovascular accident) (H) 06/21/2008     Priority: Medium     Overview:   IMO Update 10/11       Osteoarthritis of knee 03/12/2008     Priority: Medium     Overview:   IMO Update 10/11       Ascites 07/28/2007     Priority: Medium     Overview:   IMO Update          Past Medical History:    History reviewed. No pertinent past medical history.    Past Surgical History:    Past Surgical History:   Procedure Laterality Date      COLONOSCOPY  8/12/2013    Procedure: COLONOSCOPY;   Upper Endoscopy w/ biopsies and COLONOSCOPY;  Surgeon: Sheri Joseph DO;  Location: [facility]     NASAL SCOPE,BX/RMV POLYP/DEBRID       ORTHOPEDIC SURGERY      knee     VASCULAR SURGERY      carotid       Family History:    History reviewed. No pertinent family history.    Social History:  Marital Status:   [5]  Social History     Tobacco Use     Smoking status: Former Smoker     Smokeless tobacco: Never Used   Substance Use Topics     Alcohol use: No     Drug use: No        Medications:    aspirin 81 MG EC tablet  atorvastatin (LIPITOR) 40 MG tablet  Clopidogrel Bisulfate (PLAVIX PO)  finasteride (PROSCAR) 5 MG tablet  fish oil-omega-3 fatty acids 1000 MG capsule  furosemide (LASIX) 20 MG tablet  Garlic 100 MG TABS  latanoprost (XALATAN) 0.005 % ophthalmic solution  lisinopril (PRINIVIL/ZESTRIL) 5 MG tablet  melatonin 3 MG CAPS  metoprolol succinate ER (TOPROL-XL) 50 MG 24 hr tablet  mirtazapine (REMERON) 30 MG tablet  tamsulosin (FLOMAX) 0.4 MG capsule  Turmeric 500 MG TABS          Review of Systems   Constitutional: Negative for appetite change, chills and fever.   HENT: Negative for sore throat, trouble swallowing and voice change.    Eyes: Negative for pain.   Respiratory: Negative for cough, chest tightness, shortness of breath and wheezing.    Cardiovascular: Negative for chest pain, palpitations and leg swelling.   Gastrointestinal: Negative for abdominal pain, diarrhea and vomiting.   Genitourinary: Negative for dysuria, frequency and hematuria.   Musculoskeletal: Positive for back pain.   Skin: Negative for color change.   Neurological: Positive for weakness and numbness. Negative for dizziness and headaches.   Psychiatric/Behavioral: Negative for confusion and decreased concentration.       Physical Exam   BP: 131/58  Pulse: 61  Heart Rate: 61  Temp: (!) 95.8  F (35.4  C)  Resp: 16  SpO2: 98 %      Physical Exam  Vitals signs reviewed.    Constitutional:       General: He is not in acute distress.     Appearance: Normal appearance. He is not ill-appearing, toxic-appearing or diaphoretic.   HENT:      Head: Normocephalic.      Nose: Nose normal.      Mouth/Throat:      Mouth: Mucous membranes are moist.      Pharynx: Oropharynx is clear.   Eyes:      Extraocular Movements: Extraocular movements intact.      Conjunctiva/sclera: Conjunctivae normal.      Pupils: Pupils are equal, round, and reactive to light.   Cardiovascular:      Rate and Rhythm: Normal rate and regular rhythm.      Pulses: Normal pulses.      Heart sounds: Normal heart sounds.   Pulmonary:      Effort: Pulmonary effort is normal.      Breath sounds: Normal breath sounds.   Abdominal:      General: Abdomen is flat.      Palpations: There is no mass.   Musculoskeletal:         General: No swelling or tenderness.      Right lower leg: No edema.      Left lower leg: No edema.   Skin:     General: Skin is warm.      Capillary Refill: Capillary refill takes less than 2 seconds.   Neurological:      General: No focal deficit present.      Mental Status: He is oriented to person, place, and time.      Cranial Nerves: No cranial nerve deficit.      Sensory: No sensory deficit.      Motor: No weakness.      Coordination: Coordination normal.         ED Course     79-year-old male who presents with concerns about his generalized weakness which he describes as progressive leg weakness and arm weakness.  No history of recent falls.  Patient has history of heart failure although today denies any chest pain shortness of breath, no peripheral edema.    Diagnostic blood test results CBC, CMP, CK unremarkable    He lives in the building apartment on the first floor and his daughter lives on the third floor in the same building  He is able to ambulate although he feels weak in his legs    Patient would benefit from physical therapy  Advised to contact primary care provider for physical therapy  referral tomorrow morning  He will return if symptoms worsen or concerns             Results for orders placed or performed during the hospital encounter of 01/01/20 (from the past 24 hour(s))   CBC with platelets differential   Result Value Ref Range    WBC 8.6 4.0 - 11.0 10e9/L    RBC Count 4.08 (L) 4.4 - 5.9 10e12/L    Hemoglobin 13.6 13.3 - 17.7 g/dL    Hematocrit 41.1 40.0 - 53.0 %     (H) 78 - 100 fl    MCH 33.3 (H) 26.5 - 33.0 pg    MCHC 33.1 31.5 - 36.5 g/dL    RDW 14.1 10.0 - 15.0 %    Platelet Count 178 150 - 450 10e9/L    Diff Method Automated Method     % Neutrophils 58.9 %    % Lymphocytes 25.0 %    % Monocytes 9.4 %    % Eosinophils 5.9 %    % Basophils 0.5 %    % Immature Granulocytes 0.3 %    Nucleated RBCs 0 0 /100    Absolute Neutrophil 5.1 1.6 - 8.3 10e9/L    Absolute Lymphocytes 2.2 0.8 - 5.3 10e9/L    Absolute Monocytes 0.8 0.0 - 1.3 10e9/L    Absolute Eosinophils 0.5 0.0 - 0.7 10e9/L    Absolute Basophils 0.0 0.0 - 0.2 10e9/L    Abs Immature Granulocytes 0.0 0 - 0.4 10e9/L    Absolute Nucleated RBC 0.0    CK total   Result Value Ref Range    CK Total 45 30 - 300 U/L   Comprehensive metabolic panel   Result Value Ref Range    Sodium 144 133 - 144 mmol/L    Potassium 4.4 3.4 - 5.3 mmol/L    Chloride 110 (H) 94 - 109 mmol/L    Carbon Dioxide 32 20 - 32 mmol/L    Anion Gap 2 (L) 3 - 14 mmol/L    Glucose 104 (H) 70 - 99 mg/dL    Urea Nitrogen 30 7 - 30 mg/dL    Creatinine 1.12 0.66 - 1.25 mg/dL    GFR Estimate 61 >60 mL/min/[1.73_m2]    GFR Estimate If Black 71 >60 mL/min/[1.73_m2]    Calcium 8.9 8.5 - 10.1 mg/dL    Bilirubin Total 0.5 0.2 - 1.3 mg/dL    Albumin 3.2 (L) 3.4 - 5.0 g/dL    Protein Total 7.0 6.8 - 8.8 g/dL    Alkaline Phosphatase 83 40 - 150 U/L    ALT 53 0 - 70 U/L    AST 33 0 - 45 U/L       Medications - No data to display    Assessments & Plan (with Medical Decision Making)     I have reviewed the nursing notes.    I have reviewed the findings, diagnosis, plan and need for  follow up with the patient.      New Prescriptions    No medications on file       Final diagnoses:   Generalized muscle weakness   History of CVA (cerebrovascular accident)   Hx of heart failure       1/1/2020   [facility]     Chris Coto MD  01/01/20 1214

## 2020-01-02 ENCOUNTER — TELEPHONE (OUTPATIENT)
Dept: CASE MANAGEMENT | Facility: HOSPITAL | Age: 80
End: 2020-01-02

## 2020-01-02 NOTE — TELEPHONE ENCOUNTER
Consult received via inbasket: Daughter and patient are hoping someone can follow up with them to figure out what the transition of care should be or how that looks. His health is getting worse and he's feeling less safe at home as he is unable to ambulate as well as he has been.     Writer reached patient in regards to staff message. Patient states that he has been getting weaker lately, especially in his legs. He states he has still been driving up until three days ago but is unsure if he can drive now since he feels weaker. He states he had [facility] for Palliative Care but was recently discharged from services as he was doing well and graduated from their services. He states his daughter lives in an apartment upstairs but works every day and cannot transport him for appointments. He states he looked at assisted living facilities a few months ago at [facility] in [place] and one in [place] but can't think of the name.He states he was put on a waiting list.   He states that he considers moving into one but is not sure and wants to think about it some more, as he feels torn about leaving his home. He states the only current services he has in his home are meals on wheels. Discussed possible options to help him. He is unsure if he could drive to appointments for outpatient PT. He would accept a complimentary home care visit referral to see if he would qualify for home care, as he has not been home bound but may be now. Referral placed and Dora,  accepted the referral. Also discussed care coordination services available with Sherrie at [facility] and advised that she can reach out to patient to help schedule an ER follow-up visit with PCP and can also help him figure out the plan regarding services and possible assisted living placement. He agreed to a referral for this. Referral placed to Sherrie and she advised that she has met with patient before and has been  following up with him on the assisted living placement but that he has been unsure if he wants to do that. She states she will plan to call patient today to schedule his follow-up appointment and to discuss the plan with him as well. She will plan to have the provider order outpatient PT if he does not qualify for home PT. CTS will remain available.

## 2020-01-09 ENCOUNTER — DOCUMENTATION ONLY (OUTPATIENT)
Dept: CARE COORDINATION | Facility: OTHER | Age: 80
End: 2020-01-09

## 2020-07-16 ENCOUNTER — HOSPITAL ENCOUNTER (EMERGENCY)
Facility: HOSPITAL | Age: 80
Discharge: HOME | End: 2020-07-16
Attending: PHYSICIAN ASSISTANT | Admitting: PHYSICIAN ASSISTANT
Payer: COMMERCIAL

## 2020-07-16 VITALS
RESPIRATION RATE: 16 BRPM | OXYGEN SATURATION: 98 % | DIASTOLIC BLOOD PRESSURE: 95 MMHG | HEART RATE: 81 BPM | SYSTOLIC BLOOD PRESSURE: 167 MMHG | TEMPERATURE: 97.4 F

## 2020-07-16 DIAGNOSIS — H53.9 VISUAL DISTURBANCE: ICD-10-CM

## 2020-07-16 DIAGNOSIS — T50.905A ADVERSE EFFECT OF DRUG, INITIAL ENCOUNTER: ICD-10-CM

## 2020-07-16 LAB
ALBUMIN SERPL-MCNC: 3.3 G/DL (ref 3.4–5)
ALBUMIN UR-MCNC: NEGATIVE MG/DL
ALP SERPL-CCNC: 258 U/L (ref 40–150)
ALT SERPL W P-5'-P-CCNC: 129 U/L (ref 0–70)
ANION GAP SERPL CALCULATED.3IONS-SCNC: 3 MMOL/L (ref 3–14)
APPEARANCE UR: CLEAR
AST SERPL W P-5'-P-CCNC: 276 U/L (ref 0–45)
BACTERIA #/AREA URNS HPF: ABNORMAL /HPF
BASOPHILS # BLD AUTO: 0 10E9/L (ref 0–0.2)
BASOPHILS NFR BLD AUTO: 0.3 %
BILIRUB SERPL-MCNC: 0.8 MG/DL (ref 0.2–1.3)
BILIRUB UR QL STRIP: NEGATIVE
BUN SERPL-MCNC: 22 MG/DL (ref 7–30)
CALCIUM SERPL-MCNC: 8.9 MG/DL (ref 8.5–10.1)
CAOX CRY #/AREA URNS HPF: ABNORMAL /HPF
CHLORIDE SERPL-SCNC: 108 MMOL/L (ref 94–109)
CO2 SERPL-SCNC: 28 MMOL/L (ref 20–32)
COLOR UR AUTO: YELLOW
CREAT SERPL-MCNC: 0.93 MG/DL (ref 0.66–1.25)
DIFFERENTIAL METHOD BLD: ABNORMAL
EOSINOPHIL # BLD AUTO: 0.5 10E9/L (ref 0–0.7)
EOSINOPHIL NFR BLD AUTO: 4 %
ERYTHROCYTE [DISTWIDTH] IN BLOOD BY AUTOMATED COUNT: 15.4 % (ref 10–15)
ERYTHROCYTE [SEDIMENTATION RATE] IN BLOOD BY WESTERGREN METHOD: 16 MM/H (ref 0–20)
GFR SERPL CREATININE-BSD FRML MDRD: 76 ML/MIN/{1.73_M2}
GLUCOSE SERPL-MCNC: 103 MG/DL (ref 70–99)
GLUCOSE UR STRIP-MCNC: NEGATIVE MG/DL
HCT VFR BLD AUTO: 41 % (ref 40–53)
HGB BLD-MCNC: 13.6 G/DL (ref 13.3–17.7)
HGB UR QL STRIP: NEGATIVE
HYALINE CASTS #/AREA URNS LPF: 1 /LPF
IMM GRANULOCYTES # BLD: 0.1 10E9/L (ref 0–0.4)
IMM GRANULOCYTES NFR BLD: 0.4 %
KETONES UR STRIP-MCNC: NEGATIVE MG/DL
LEUKOCYTE ESTERASE UR QL STRIP: ABNORMAL
LYMPHOCYTES # BLD AUTO: 2.2 10E9/L (ref 0.8–5.3)
LYMPHOCYTES NFR BLD AUTO: 19 %
MCH RBC QN AUTO: 32.3 PG (ref 26.5–33)
MCHC RBC AUTO-ENTMCNC: 33.2 G/DL (ref 31.5–36.5)
MCV RBC AUTO: 97 FL (ref 78–100)
MONOCYTES # BLD AUTO: 1.2 10E9/L (ref 0–1.3)
MONOCYTES NFR BLD AUTO: 10.2 %
MUCOUS THREADS #/AREA URNS LPF: PRESENT /LPF
NEUTROPHILS # BLD AUTO: 7.6 10E9/L (ref 1.6–8.3)
NEUTROPHILS NFR BLD AUTO: 66.1 %
NITRATE UR QL: NEGATIVE
NRBC # BLD AUTO: 0 10*3/UL
NRBC BLD AUTO-RTO: 0 /100
PH UR STRIP: 6 PH (ref 4.7–8)
PLATELET # BLD AUTO: 181 10E9/L (ref 150–450)
POTASSIUM SERPL-SCNC: 4.2 MMOL/L (ref 3.4–5.3)
PROT SERPL-MCNC: 7.5 G/DL (ref 6.8–8.8)
RBC # BLD AUTO: 4.21 10E12/L (ref 4.4–5.9)
RBC #/AREA URNS AUTO: 1 /HPF (ref 0–2)
SODIUM SERPL-SCNC: 139 MMOL/L (ref 133–144)
SOURCE: ABNORMAL
SP GR UR STRIP: 1.02 (ref 1–1.03)
SQUAMOUS #/AREA URNS AUTO: <1 /HPF (ref 0–1)
UROBILINOGEN UR STRIP-MCNC: 4 MG/DL (ref 0–2)
WBC # BLD AUTO: 11.5 10E9/L (ref 4–11)
WBC #/AREA URNS AUTO: 5 /HPF (ref 0–5)

## 2020-07-16 PROCEDURE — 99283 EMERGENCY DEPT VISIT LOW MDM: CPT | Mod: Z6 | Performed by: PHYSICIAN ASSISTANT

## 2020-07-16 PROCEDURE — 85652 RBC SED RATE AUTOMATED: CPT | Performed by: PHYSICIAN ASSISTANT

## 2020-07-16 PROCEDURE — 99283 EMERGENCY DEPT VISIT LOW MDM: CPT

## 2020-07-16 PROCEDURE — 36415 COLL VENOUS BLD VENIPUNCTURE: CPT | Performed by: PHYSICIAN ASSISTANT

## 2020-07-16 PROCEDURE — 85025 COMPLETE CBC W/AUTO DIFF WBC: CPT | Performed by: PHYSICIAN ASSISTANT

## 2020-07-16 PROCEDURE — 81001 URINALYSIS AUTO W/SCOPE: CPT | Performed by: PHYSICIAN ASSISTANT

## 2020-07-16 PROCEDURE — 80053 COMPREHEN METABOLIC PANEL: CPT | Performed by: PHYSICIAN ASSISTANT

## 2020-07-16 RX ORDER — DORZOLAMIDE HCL 20 MG/ML
1 SOLUTION/ DROPS OPHTHALMIC 2 TIMES DAILY
Status: ON HOLD | COMMUNITY
End: 2022-01-01

## 2020-07-16 NOTE — ED NOTES
Discharge instructions reviewed with patient and daughter, and both verbalized understanding. Pt wheeled to exit by RN and assisted in vehicle.

## 2020-07-16 NOTE — ED TRIAGE NOTES
Pt states he has been having tunnel vision with glare for 2 weeks. History of glaucoma. On eye drops Alphagan and lumigan eye drops states vision worse after starting the drops.

## 2020-07-16 NOTE — ED AVS SNAPSHOT
[facility]  [address] [address]  Phone:  [phone]                                    Antonio Shoemaker   MRN: [id number]    Department:  [facility]   Date of Visit:  7/16/2020           After Visit Summary Signature Page    I have received my discharge instructions, and my questions have been answered. I have discussed any challenges I see with this plan with the nurse or doctor.    ..........................................................................................................................................  Patient/Patient Representative Signature      ..........................................................................................................................................  Patient Representative Print Name and Relationship to Patient    ..................................................               ................................................  Date                                   Time    ..........................................................................................................................................  Reviewed by Signature/Title    ...................................................              ..............................................  Date                                               Time          22EPIC Rev 08/18

## 2020-07-16 NOTE — ED PROVIDER NOTES
History     Chief Complaint   Patient presents with     Visual Field     HPI  Antonio Shoemaker is a 80 year old male who presents here with a one-week history of tunnel vision to the left eye was a clear sensation over his left eye.  He recently started while he is using Alphagan drops.  These are new eyedrops for him.  The story he tells me is that he puts the eyedrops in he gets super lightheaded weak cannot get out of the chair and then develops his visual disturbance.  On presentation here his blood pressure systolic was 70 it did correct on its own. He tells me that the symptoms gradually wear off and then when he places the eyedrops and again he develops the whole symptomology again.  No pain in the eye.  He has no visual field cuts.  He has a history of known glaucoma.  He had appointment to see his ophthalmologist tomorrow but canceled and came here to the ER instead    Allergies:  No Known Allergies    Problem List:    Patient Active Problem List    Diagnosis Date Noted     Heart failure with reduced ejection fraction, NYHA class II (H) 02/14/2019     Priority: Medium     Benign prostatic hyperplasia with urinary retention 06/27/2016     Priority: Medium     History of CVA (cerebrovascular accident) 06/27/2016     Priority: Medium     Status post total left knee replacement 06/27/2016     Priority: Medium     Carotid artery stenosis 09/24/2009     Priority: Medium     Dyslipidemia 07/29/2009     Priority: Medium     Encounter for long-term (current) use of other medications 07/29/2009     Priority: Medium     Incidental lung nodule 07/02/2008     Priority: Medium     Overview:   IMO Update 10/11       CVA (cerebrovascular accident) (H) 06/21/2008     Priority: Medium     Overview:   IMO Update 10/11       Osteoarthritis of knee 03/12/2008     Priority: Medium     Overview:   IMO Update 10/11       Ascites 07/28/2007     Priority: Medium     Overview:   IMO Update          Past Medical History:    History  reviewed. No pertinent past medical history.    Past Surgical History:    Past Surgical History:   Procedure Laterality Date     COLONOSCOPY  8/12/2013    Procedure: COLONOSCOPY;   Upper Endoscopy w/ biopsies and COLONOSCOPY;  Surgeon: Sheri Joseph DO;  Location: [facility]     NASAL SCOPE,BX/RMV POLYP/DEBRID       ORTHOPEDIC SURGERY      knee     VASCULAR SURGERY      carotid       Family History:    No family history on file.    Social History:  Marital Status:   [5]  Social History     Tobacco Use     Smoking status: Former Smoker     Smokeless tobacco: Never Used   Substance Use Topics     Alcohol use: No     Drug use: No        Medications:    aspirin 81 MG EC tablet  atorvastatin (LIPITOR) 40 MG tablet  bimatoprost (LUMIGAN) 0.01 % SOLN  Clopidogrel Bisulfate (PLAVIX PO)  dorzolamide (TRUSOPT) 2 % ophthalmic solution  finasteride (PROSCAR) 5 MG tablet  latanoprost (XALATAN) 0.005 % ophthalmic solution  furosemide (LASIX) 20 MG tablet  Garlic 100 MG TABS  lisinopril (PRINIVIL/ZESTRIL) 5 MG tablet  melatonin 3 MG CAPS  metoprolol succinate ER (TOPROL-XL) 50 MG 24 hr tablet  mirtazapine (REMERON) 30 MG tablet  tamsulosin (FLOMAX) 0.4 MG capsule  Turmeric 500 MG TABS          Review of Systems  I did do a complete 10 point review of systems. Pertinent positives and negatives listed per HPI. All other systems have been reviewed and are negative.      Physical Exam   BP: (!) 73/60(daughter states in the past there was an eye drop prescriped that caused a low blood pressure.)  Pulse: 77  Heart Rate: 76  Temp: 96.1  F (35.6  C)  Resp: 16  SpO2: 99 %      Physical Exam  Vitals signs and nursing note reviewed.   Constitutional:       General: He is not in acute distress.     Appearance: He is not ill-appearing, toxic-appearing or diaphoretic.   HENT:      Head: Normocephalic and atraumatic.      Ears:      Comments: Left eye is pupils reactive he does have a haziness of the cornea.  Pupils equal react to light  extraocular movements are intact.  At this point time his vision disturbance has resolved and his vision is back to his baseline  Cardiovascular:      Rate and Rhythm: Normal rate.   Pulmonary:      Effort: Pulmonary effort is normal. No respiratory distress.   Neurological:      General: No focal deficit present.      Mental Status: He is alert and oriented to person, place, and time.   Psychiatric:         Mood and Affect: Mood normal.         Behavior: Behavior normal.         Thought Content: Thought content normal.         Judgment: Judgment normal.         ED Course   80-year-old male presents here with possible adverse reaction to Alphagan P eyedrops.  There are new drops to him.  When he places the drops and he develops tunnel vision particular over his left eye gets extremely weak and dizzy he is not able to get out of his chair due to his weakness and dizziness.  Then it wears off gradually goes back to baseline use of the eyedrops again in the whole symptomatology repeats itself.  Although I have never seen in practice I did look up on a pack a days he says that Alphagan P can lower the blood pressure he certainly was hypotensive here on presentation which resolved itself.  I spoke with the ophthalmology clinic she has a follow-up appointment tomorrow at 1:50 PM.  Labs reviewed white count 11 5 hemoglobin 13.6 platelets 181.  Sed rate is 16.               Results for orders placed or performed during the hospital encounter of 07/16/20 (from the past 24 hour(s))   CBC with platelets differential   Result Value Ref Range    WBC 11.5 (H) 4.0 - 11.0 10e9/L    RBC Count 4.21 (L) 4.4 - 5.9 10e12/L    Hemoglobin 13.6 13.3 - 17.7 g/dL    Hematocrit 41.0 40.0 - 53.0 %    MCV 97 78 - 100 fl    MCH 32.3 26.5 - 33.0 pg    MCHC 33.2 31.5 - 36.5 g/dL    RDW 15.4 (H) 10.0 - 15.0 %    Platelet Count 181 150 - 450 10e9/L    Diff Method Automated Method     % Neutrophils 66.1 %    % Lymphocytes 19.0 %    % Monocytes 10.2 %     % Eosinophils 4.0 %    % Basophils 0.3 %    % Immature Granulocytes 0.4 %    Nucleated RBCs 0 0 /100    Absolute Neutrophil 7.6 1.6 - 8.3 10e9/L    Absolute Lymphocytes 2.2 0.8 - 5.3 10e9/L    Absolute Monocytes 1.2 0.0 - 1.3 10e9/L    Absolute Eosinophils 0.5 0.0 - 0.7 10e9/L    Absolute Basophils 0.0 0.0 - 0.2 10e9/L    Abs Immature Granulocytes 0.1 0 - 0.4 10e9/L    Absolute Nucleated RBC 0.0    Comprehensive metabolic panel   Result Value Ref Range    Sodium 139 133 - 144 mmol/L    Potassium 4.2 3.4 - 5.3 mmol/L    Chloride 108 94 - 109 mmol/L    Carbon Dioxide 28 20 - 32 mmol/L    Anion Gap 3 3 - 14 mmol/L    Glucose 103 (H) 70 - 99 mg/dL    Urea Nitrogen 22 7 - 30 mg/dL    Creatinine 0.93 0.66 - 1.25 mg/dL    GFR Estimate 76 >60 mL/min/[1.73_m2]    GFR Estimate If Black 88 >60 mL/min/[1.73_m2]    Calcium 8.9 8.5 - 10.1 mg/dL    Bilirubin Total 0.8 0.2 - 1.3 mg/dL    Albumin 3.3 (L) 3.4 - 5.0 g/dL    Protein Total 7.5 6.8 - 8.8 g/dL    Alkaline Phosphatase 258 (H) 40 - 150 U/L     (H) 0 - 70 U/L     (H) 0 - 45 U/L   Erythrocyte sedimentation rate auto   Result Value Ref Range    Sed Rate 16 0 - 20 mm/h       Medications - No data to display    Assessments & Plan (with Medical Decision Making)     I have reviewed the nursing notes.    I have reviewed the findings, diagnosis, plan and need for follow up with the patient.      New Prescriptions    No medications on file       Final diagnoses:   Adverse effect of drug, initial encounter   Visual disturbance       7/16/2020   [facility]

## 2020-08-04 ENCOUNTER — DOCUMENTATION ONLY (OUTPATIENT)
Dept: OTHER | Facility: CLINIC | Age: 80
End: 2020-08-04

## 2020-08-25 ENCOUNTER — MEDICAL CORRESPONDENCE (OUTPATIENT)
Dept: MRI IMAGING | Facility: HOSPITAL | Age: 80
End: 2020-08-25

## 2020-08-27 ENCOUNTER — TRANSFERRED RECORDS (OUTPATIENT)
Dept: HEALTH INFORMATION MANAGEMENT | Facility: CLINIC | Age: 80
End: 2020-08-27

## 2020-08-27 LAB
CREAT SERPL-MCNC: 0.91 MG/DL (ref 0.8–1.5)
GFR SERPL CREATININE-BSD FRML MDRD: >60 ML/MIN/1.73M2
GLUCOSE SERPL-MCNC: 95 MG/DL (ref 60–99)
POTASSIUM SERPL-SCNC: 4.6 MMOL/L (ref 3.5–5.1)

## 2020-08-29 ENCOUNTER — IMMUNIZATION (OUTPATIENT)
Dept: FAMILY MEDICINE | Facility: OTHER | Age: 80
End: 2020-08-29
Attending: FAMILY MEDICINE
Payer: COMMERCIAL

## 2020-08-29 ENCOUNTER — RESULTS ONLY (OUTPATIENT)
Dept: FAMILY MEDICINE | Facility: OTHER | Age: 80
End: 2020-08-29

## 2020-08-29 DIAGNOSIS — Z01.818 PREOP GENERAL PHYSICAL EXAM: Primary | ICD-10-CM

## 2020-08-29 PROCEDURE — U0003 INFECTIOUS AGENT DETECTION BY NUCLEIC ACID (DNA OR RNA); SEVERE ACUTE RESPIRATORY SYNDROME CORONAVIRUS 2 (SARS-COV-2) (CORONAVIRUS DISEASE [COVID-19]), AMPLIFIED PROBE TECHNIQUE, MAKING USE OF HIGH THROUGHPUT TECHNOLOGIES AS DESCRIBED BY CMS-2020-01-R: HCPCS | Mod: ZL | Performed by: FAMILY MEDICINE

## 2020-08-30 LAB
SARS-COV-2 RNA SPEC QL NAA+PROBE: NORMAL
SPECIMEN SOURCE: NORMAL

## 2020-08-31 LAB
LABORATORY COMMENT REPORT: NORMAL
SARS-COV-2 RNA SPEC QL NAA+PROBE: NEGATIVE
SPECIMEN SOURCE: NORMAL

## 2020-09-15 ENCOUNTER — HOSPITAL ENCOUNTER (OUTPATIENT)
Dept: MRI IMAGING | Facility: HOSPITAL | Age: 80
Discharge: HOME | End: 2020-09-15
Attending: FAMILY MEDICINE | Admitting: FAMILY MEDICINE
Payer: COMMERCIAL

## 2020-09-15 DIAGNOSIS — H34.212 PARTIAL RETINAL ARTERY OCCLUSION, LEFT EYE: ICD-10-CM

## 2020-09-15 DIAGNOSIS — H53.9 UNSPECIFIED VISUAL DISTURBANCE: ICD-10-CM

## 2020-09-15 PROCEDURE — 70553 MRI BRAIN STEM W/O & W/DYE: CPT | Mod: TC

## 2020-09-15 PROCEDURE — A9585 GADOBUTROL INJECTION: HCPCS | Performed by: RADIOLOGY

## 2020-09-15 PROCEDURE — 25500064 ZZH RX 255 OP 636: Performed by: RADIOLOGY

## 2020-09-15 RX ORDER — GADOBUTROL 604.72 MG/ML
7.5 INJECTION INTRAVENOUS ONCE
Status: COMPLETED | OUTPATIENT
Start: 2020-09-15 | End: 2020-09-15

## 2020-09-15 RX ADMIN — GADOBUTROL 7.5 ML: 604.72 INJECTION INTRAVENOUS at 12:49

## 2020-09-16 ENCOUNTER — DOCUMENTATION ONLY (OUTPATIENT)
Dept: OTHER | Facility: CLINIC | Age: 80
End: 2020-09-16

## 2021-01-01 ENCOUNTER — HOSPITAL ENCOUNTER (EMERGENCY)
Facility: HOSPITAL | Age: 81
Discharge: HOME | End: 2021-07-22
Attending: EMERGENCY MEDICINE | Admitting: EMERGENCY MEDICINE
Payer: COMMERCIAL

## 2021-01-01 ENCOUNTER — APPOINTMENT (OUTPATIENT)
Dept: CT IMAGING | Facility: HOSPITAL | Age: 81
End: 2021-01-01
Attending: NURSE PRACTITIONER
Payer: COMMERCIAL

## 2021-01-01 ENCOUNTER — APPOINTMENT (OUTPATIENT)
Dept: CT IMAGING | Facility: HOSPITAL | Age: 81
End: 2021-01-01
Attending: EMERGENCY MEDICINE
Payer: COMMERCIAL

## 2021-01-01 ENCOUNTER — HOSPITAL ENCOUNTER (EMERGENCY)
Facility: HOSPITAL | Age: 81
Discharge: HOME | End: 2021-07-14
Attending: NURSE PRACTITIONER | Admitting: NURSE PRACTITIONER
Payer: COMMERCIAL

## 2021-01-01 VITALS
RESPIRATION RATE: 16 BRPM | HEART RATE: 63 BPM | DIASTOLIC BLOOD PRESSURE: 82 MMHG | TEMPERATURE: 96.6 F | SYSTOLIC BLOOD PRESSURE: 173 MMHG | OXYGEN SATURATION: 98 %

## 2021-01-01 VITALS
BODY MASS INDEX: 29.12 KG/M2 | WEIGHT: 175 LBS | DIASTOLIC BLOOD PRESSURE: 73 MMHG | HEART RATE: 71 BPM | TEMPERATURE: 97.8 F | OXYGEN SATURATION: 98 % | SYSTOLIC BLOOD PRESSURE: 131 MMHG | RESPIRATION RATE: 13 BRPM

## 2021-01-01 DIAGNOSIS — S01.111A LACERATION OF EYELID OF RIGHT EYE WITHOUT FOREIGN BODY, INITIAL ENCOUNTER: ICD-10-CM

## 2021-01-01 DIAGNOSIS — R42 DIZZINESS: ICD-10-CM

## 2021-01-01 DIAGNOSIS — N30.00 ACUTE CYSTITIS WITHOUT HEMATURIA: ICD-10-CM
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
## 2021-01-01 LAB
ALBUMIN UR-MCNC: NEGATIVE MG/DL
ANION GAP SERPL CALCULATED.3IONS-SCNC: 3 MMOL/L (ref 3–14)
APPEARANCE UR: CLEAR
BACTERIA UR CULT: ABNORMAL
BASOPHILS # BLD AUTO: 0 10E3/UL (ref 0–0.2)
BASOPHILS NFR BLD AUTO: 1 %
BILIRUB UR QL STRIP: NEGATIVE
BUN SERPL-MCNC: 16 MG/DL (ref 7–30)
CALCIUM SERPL-MCNC: 8.6 MG/DL (ref 8.5–10.1)
CHLORIDE BLD-SCNC: 109 MMOL/L (ref 94–109)
CO2 SERPL-SCNC: 29 MMOL/L (ref 20–32)
COLOR UR AUTO: ABNORMAL
CREAT SERPL-MCNC: 0.99 MG/DL (ref 0.66–1.25)
EOSINOPHIL # BLD AUTO: 0.6 10E3/UL (ref 0–0.7)
EOSINOPHIL NFR BLD AUTO: 9 %
ERYTHROCYTE [DISTWIDTH] IN BLOOD BY AUTOMATED COUNT: 15.2 % (ref 10–15)
GFR SERPL CREATININE-BSD FRML MDRD: 70 ML/MIN/1.73M2
GLUCOSE BLD-MCNC: 107 MG/DL (ref 70–99)
GLUCOSE UR STRIP-MCNC: NEGATIVE MG/DL
HCT VFR BLD AUTO: 42.6 % (ref 40–53)
HGB BLD-MCNC: 13.7 G/DL (ref 13.3–17.7)
HGB UR QL STRIP: NEGATIVE
IMM GRANULOCYTES # BLD: 0 10E3/UL
IMM GRANULOCYTES NFR BLD: 0 %
KETONES UR STRIP-MCNC: NEGATIVE MG/DL
LEUKOCYTE ESTERASE UR QL STRIP: ABNORMAL
LYMPHOCYTES # BLD AUTO: 2.2 10E3/UL (ref 0.8–5.3)
LYMPHOCYTES NFR BLD AUTO: 31 %
MCH RBC QN AUTO: 32.9 PG (ref 26.5–33)
MCHC RBC AUTO-ENTMCNC: 32.2 G/DL (ref 31.5–36.5)
MCV RBC AUTO: 102 FL (ref 78–100)
MONOCYTES # BLD AUTO: 0.7 10E3/UL (ref 0–1.3)
MONOCYTES NFR BLD AUTO: 10 %
MUCOUS THREADS #/AREA URNS LPF: PRESENT /LPF
NEUTROPHILS # BLD AUTO: 3.6 10E3/UL (ref 1.6–8.3)
NEUTROPHILS NFR BLD AUTO: 49 %
NITRATE UR QL: NEGATIVE
NRBC # BLD AUTO: 0 10E3/UL
NRBC BLD AUTO-RTO: 0 /100
NT-PROBNP SERPL-MCNC: 540 PG/ML (ref 0–1800)
PH UR STRIP: 5.5 [PH] (ref 4.7–8)
PLATELET # BLD AUTO: 189 10E3/UL (ref 150–450)
POTASSIUM BLD-SCNC: 4.8 MMOL/L (ref 3.4–5.3)
RBC # BLD AUTO: 4.17 10E6/UL (ref 4.4–5.9)
RBC URINE: 2 /HPF
SODIUM SERPL-SCNC: 141 MMOL/L (ref 133–144)
SP GR UR STRIP: 1.01 (ref 1–1.03)
SQUAMOUS EPITHELIAL: 1 /HPF
TROPONIN I SERPL-MCNC: <0.015 UG/L (ref 0–0.04)
UROBILINOGEN UR STRIP-MCNC: NORMAL MG/DL
WBC # BLD AUTO: 7.1 10E3/UL (ref 4–11)
WBC URINE: 5 /HPF

## 2021-01-01 PROCEDURE — 250N000009 HC RX 250: Performed by: NURSE PRACTITIONER

## 2021-01-01 PROCEDURE — 84484 ASSAY OF TROPONIN QUANT: CPT | Performed by: EMERGENCY MEDICINE

## 2021-01-01 PROCEDURE — 70450 CT HEAD/BRAIN W/O DYE: CPT

## 2021-01-01 PROCEDURE — 87086 URINE CULTURE/COLONY COUNT: CPT | Performed by: EMERGENCY MEDICINE

## 2021-01-01 PROCEDURE — 81001 URINALYSIS AUTO W/SCOPE: CPT | Performed by: EMERGENCY MEDICINE

## 2021-01-01 PROCEDURE — 85025 COMPLETE CBC W/AUTO DIFF WBC: CPT | Performed by: EMERGENCY MEDICINE

## 2021-01-01 PROCEDURE — 12011 RPR F/E/E/N/L/M 2.5 CM/<: CPT

## 2021-01-01 PROCEDURE — 12011 RPR F/E/E/N/L/M 2.5 CM/<: CPT | Performed by: NURSE PRACTITIONER

## 2021-01-01 PROCEDURE — 99284 EMERGENCY DEPT VISIT MOD MDM: CPT | Mod: 25

## 2021-01-01 PROCEDURE — 83880 ASSAY OF NATRIURETIC PEPTIDE: CPT | Mod: GZ | Performed by: EMERGENCY MEDICINE

## 2021-01-01 PROCEDURE — 250N000011 HC RX IP 250 OP 636: Performed by: NURSE PRACTITIONER

## 2021-01-01 PROCEDURE — 72131 CT LUMBAR SPINE W/O DYE: CPT

## 2021-01-01 PROCEDURE — 93010 ELECTROCARDIOGRAM REPORT: CPT | Performed by: INTERNAL MEDICINE

## 2021-01-01 PROCEDURE — 93005 ELECTROCARDIOGRAM TRACING: CPT

## 2021-01-01 PROCEDURE — 36415 COLL VENOUS BLD VENIPUNCTURE: CPT | Performed by: EMERGENCY MEDICINE

## 2021-01-01 PROCEDURE — 80048 BASIC METABOLIC PNL TOTAL CA: CPT | Performed by: EMERGENCY MEDICINE

## 2021-01-01 PROCEDURE — 99285 EMERGENCY DEPT VISIT HI MDM: CPT | Mod: 25

## 2021-01-01 PROCEDURE — 99285 EMERGENCY DEPT VISIT HI MDM: CPT | Performed by: EMERGENCY MEDICINE

## 2021-01-01 PROCEDURE — 99283 EMERGENCY DEPT VISIT LOW MDM: CPT | Mod: 25 | Performed by: NURSE PRACTITIONER

## 2021-01-01 RX ORDER — NITROFURANTOIN 25; 75 MG/1; MG/1
100 CAPSULE ORAL 2 TIMES DAILY
Qty: 14 CAPSULE | Refills: 0 | Status: ON HOLD | OUTPATIENT
Start: 2021-01-01 | End: 2022-01-01

## 2021-01-01 RX ADMIN — EPINEPHRINE BITARTRATE 5 ML: 1 POWDER at 17:57

## 2021-01-01 ASSESSMENT — ENCOUNTER SYMPTOMS
BACK PAIN: 0
CONSTITUTIONAL NEGATIVE: 1
LIGHT-HEADEDNESS: 0
DIZZINESS: 0
HEADACHES: 0
WOUND: 1
DIZZINESS: 1
MYALGIAS: 0
NECK PAIN: 0
ABDOMINAL PAIN: 0
SHORTNESS OF BREATH: 0
NUMBNESS: 0
EYES NEGATIVE: 1
GASTROINTESTINAL NEGATIVE: 1
WEAKNESS: 0
ARTHRALGIAS: 0
BACK PAIN: 1
RESPIRATORY NEGATIVE: 1
CARDIOVASCULAR NEGATIVE: 1
ENDOCRINE NEGATIVE: 1

## 2021-02-18 ENCOUNTER — HOSPITAL ENCOUNTER (EMERGENCY)
Facility: HOSPITAL | Age: 81
Discharge: NURSING, REHAB OR LONG-TERM CARE | End: 2021-02-18
Attending: PHYSICIAN ASSISTANT | Admitting: PHYSICIAN ASSISTANT
Payer: COMMERCIAL

## 2021-02-18 VITALS
OXYGEN SATURATION: 97 % | HEIGHT: 65 IN | HEART RATE: 76 BPM | TEMPERATURE: 97.9 F | BODY MASS INDEX: 27.29 KG/M2 | RESPIRATION RATE: 16 BRPM | SYSTOLIC BLOOD PRESSURE: 140 MMHG | DIASTOLIC BLOOD PRESSURE: 80 MMHG

## 2021-02-18 DIAGNOSIS — R42 POSTURAL DIZZINESS: ICD-10-CM

## 2021-02-18 DIAGNOSIS — R82.81 PYURIA: ICD-10-CM

## 2021-02-18 LAB
ALBUMIN SERPL-MCNC: 3.3 G/DL (ref 3.4–5)
ALBUMIN UR-MCNC: NEGATIVE MG/DL
ALP SERPL-CCNC: 128 U/L (ref 40–150)
ALT SERPL W P-5'-P-CCNC: 27 U/L (ref 0–70)
ANION GAP SERPL CALCULATED.3IONS-SCNC: 3 MMOL/L (ref 3–14)
APPEARANCE UR: CLEAR
AST SERPL W P-5'-P-CCNC: 24 U/L (ref 0–45)
BACTERIA #/AREA URNS HPF: ABNORMAL /HPF
BASOPHILS # BLD AUTO: 0.1 10E9/L (ref 0–0.2)
BASOPHILS NFR BLD AUTO: 0.6 %
BILIRUB SERPL-MCNC: 0.5 MG/DL (ref 0.2–1.3)
BILIRUB UR QL STRIP: NEGATIVE
BUN SERPL-MCNC: 20 MG/DL (ref 7–30)
CALCIUM SERPL-MCNC: 8.7 MG/DL (ref 8.5–10.1)
CHLORIDE SERPL-SCNC: 110 MMOL/L (ref 94–109)
CO2 SERPL-SCNC: 28 MMOL/L (ref 20–32)
COLOR UR AUTO: ABNORMAL
CREAT SERPL-MCNC: 1.02 MG/DL (ref 0.66–1.25)
DIFFERENTIAL METHOD BLD: ABNORMAL
EOSINOPHIL # BLD AUTO: 0.8 10E9/L (ref 0–0.7)
EOSINOPHIL NFR BLD AUTO: 8.7 %
ERYTHROCYTE [DISTWIDTH] IN BLOOD BY AUTOMATED COUNT: 15.2 % (ref 10–15)
GFR SERPL CREATININE-BSD FRML MDRD: 68 ML/MIN/{1.73_M2}
GLUCOSE SERPL-MCNC: 94 MG/DL (ref 70–99)
GLUCOSE UR STRIP-MCNC: NEGATIVE MG/DL
HCT VFR BLD AUTO: 42.6 % (ref 40–53)
HGB BLD-MCNC: 13.6 G/DL (ref 13.3–17.7)
HGB UR QL STRIP: NEGATIVE
IMM GRANULOCYTES # BLD: 0 10E9/L (ref 0–0.4)
IMM GRANULOCYTES NFR BLD: 0.4 %
KETONES UR STRIP-MCNC: NEGATIVE MG/DL
LEUKOCYTE ESTERASE UR QL STRIP: ABNORMAL
LYMPHOCYTES # BLD AUTO: 2.1 10E9/L (ref 0.8–5.3)
LYMPHOCYTES NFR BLD AUTO: 23.3 %
MCH RBC QN AUTO: 33.2 PG (ref 26.5–33)
MCHC RBC AUTO-ENTMCNC: 31.9 G/DL (ref 31.5–36.5)
MCV RBC AUTO: 104 FL (ref 78–100)
MONOCYTES # BLD AUTO: 0.8 10E9/L (ref 0–1.3)
MONOCYTES NFR BLD AUTO: 8.5 %
MUCOUS THREADS #/AREA URNS LPF: PRESENT /LPF
NEUTROPHILS # BLD AUTO: 5.2 10E9/L (ref 1.6–8.3)
NEUTROPHILS NFR BLD AUTO: 58.5 %
NITRATE UR QL: NEGATIVE
NRBC # BLD AUTO: 0 10*3/UL
NRBC BLD AUTO-RTO: 0 /100
PH UR STRIP: 5.5 PH (ref 4.7–8)
PLATELET # BLD AUTO: 199 10E9/L (ref 150–450)
POTASSIUM SERPL-SCNC: 4.4 MMOL/L (ref 3.4–5.3)
PROT SERPL-MCNC: 7.3 G/DL (ref 6.8–8.8)
RBC # BLD AUTO: 4.1 10E12/L (ref 4.4–5.9)
RBC #/AREA URNS AUTO: 1 /HPF (ref 0–2)
SODIUM SERPL-SCNC: 141 MMOL/L (ref 133–144)
SOURCE: ABNORMAL
SP GR UR STRIP: 1.02 (ref 1–1.03)
SQUAMOUS #/AREA URNS AUTO: 1 /HPF (ref 0–1)
TROPONIN I SERPL-MCNC: <0.015 UG/L (ref 0–0.04)
UROBILINOGEN UR STRIP-MCNC: NORMAL MG/DL (ref 0–2)
WBC # BLD AUTO: 8.9 10E9/L (ref 4–11)
WBC #/AREA URNS AUTO: 11 /HPF (ref 0–5)

## 2021-02-18 PROCEDURE — 99284 EMERGENCY DEPT VISIT MOD MDM: CPT | Performed by: PHYSICIAN ASSISTANT

## 2021-02-18 PROCEDURE — 99283 EMERGENCY DEPT VISIT LOW MDM: CPT

## 2021-02-18 PROCEDURE — 84484 ASSAY OF TROPONIN QUANT: CPT | Performed by: PHYSICIAN ASSISTANT

## 2021-02-18 PROCEDURE — 36415 COLL VENOUS BLD VENIPUNCTURE: CPT | Performed by: PHYSICIAN ASSISTANT

## 2021-02-18 PROCEDURE — 81001 URINALYSIS AUTO W/SCOPE: CPT | Performed by: PHYSICIAN ASSISTANT

## 2021-02-18 PROCEDURE — 80053 COMPREHEN METABOLIC PANEL: CPT | Performed by: PHYSICIAN ASSISTANT

## 2021-02-18 PROCEDURE — 85025 COMPLETE CBC W/AUTO DIFF WBC: CPT | Performed by: PHYSICIAN ASSISTANT

## 2021-02-18 RX ORDER — CEPHALEXIN 500 MG/1
500 CAPSULE ORAL 2 TIMES DAILY
Qty: 14 CAPSULE | Refills: 0 | Status: SHIPPED | OUTPATIENT
Start: 2021-02-18 | End: 2021-02-25

## 2021-02-18 ASSESSMENT — ENCOUNTER SYMPTOMS
NAUSEA: 0
NECK PAIN: 0
FEVER: 0
HEMATURIA: 0
ABDOMINAL PAIN: 0
APPETITE CHANGE: 0
PHOTOPHOBIA: 0
SHORTNESS OF BREATH: 0
PALPITATIONS: 0
NECK STIFFNESS: 0
ACTIVITY CHANGE: 0
BRUISES/BLEEDS EASILY: 1
VOMITING: 0
BACK PAIN: 0

## 2021-02-18 NOTE — ED TRIAGE NOTES
Patient presents with concerns about dizziness; notes he has been dizzy on/off for a long time; worse this week and even worse the past two days.  Also notes some ringing in the ears which has been worse recently.

## 2021-02-18 NOTE — ED NOTES
Care Transitions focused note:      Request from nsg to attend triage.  Pt is 80 year old male presenting with daughter with dizziness and unsteady on feet.     Pt lives at [facility].  Is alert and oriented. Daughter Marcella is present and supportive.    Will follow as needed.    RASHEEDA Hernandez

## 2021-02-18 NOTE — ED PROVIDER NOTES
"  History     Chief Complaint   Patient presents with     Dizziness     The history is provided by the patient.     Antonio Shoemaker is a 80 year old male who presented to the emergency department along with daughter for evaluation of persistent dizziness on position change.  From what I can gather from the patient and family member, this has been present for several months if not longer.  He has had multiple work-ups including multiple imaging of the brain and ultrasounds.  He does have a history of rather significant vascular disease, heart failure, previous CVA, carotid endarterectomy, and a multitude of other comorbidities.  The patient tells me that at rest and seated he feels \"normal.\"  His symptoms of dizziness and lightheadedness are only present with position change and ambulation.  Denies any headaches, focal weakness, falls, chest pain, palpitations, or other concerns.    Allergies:  No Known Allergies    Problem List:    Patient Active Problem List    Diagnosis Date Noted     Heart failure with reduced ejection fraction, NYHA class II (H) 02/14/2019     Priority: Medium     Benign prostatic hyperplasia with urinary retention 06/27/2016     Priority: Medium     History of CVA (cerebrovascular accident) 06/27/2016     Priority: Medium     Status post total left knee replacement 06/27/2016     Priority: Medium     Carotid artery stenosis 09/24/2009     Priority: Medium     Dyslipidemia 07/29/2009     Priority: Medium     Encounter for long-term (current) use of other medications 07/29/2009     Priority: Medium     Incidental lung nodule 07/02/2008     Priority: Medium     Overview:   IMO Update 10/11       CVA (cerebrovascular accident) (H) 06/21/2008     Priority: Medium     Overview:   IMO Update 10/11       Osteoarthritis of knee 03/12/2008     Priority: Medium     Overview:   IMO Update 10/11       Ascites 07/28/2007     Priority: Medium     Overview:   IMO Update          Past Medical History:    No " past medical history on file.    Past Surgical History:    Past Surgical History:   Procedure Laterality Date     COLONOSCOPY  8/12/2013    Procedure: COLONOSCOPY;   Upper Endoscopy w/ biopsies and COLONOSCOPY;  Surgeon: Sheri Joseph DO;  Location: [facility]     NASAL SCOPE,BX/RMV POLYP/DEBRID       ORTHOPEDIC SURGERY      knee     VASCULAR SURGERY      carotid       Family History:    No family history on file.    Social History:  Marital Status:   [5]  Social History     Tobacco Use     Smoking status: Former Smoker     Smokeless tobacco: Never Used   Substance Use Topics     Alcohol use: No     Drug use: No        Medications:    cephALEXin (KEFLEX) 500 MG capsule  aspirin 81 MG EC tablet  atorvastatin (LIPITOR) 40 MG tablet  bimatoprost (LUMIGAN) 0.01 % SOLN  Clopidogrel Bisulfate (PLAVIX PO)  dorzolamide (TRUSOPT) 2 % ophthalmic solution  finasteride (PROSCAR) 5 MG tablet  furosemide (LASIX) 20 MG tablet  Garlic 100 MG TABS  latanoprost (XALATAN) 0.005 % ophthalmic solution  lisinopril (PRINIVIL/ZESTRIL) 5 MG tablet  melatonin 3 MG CAPS  metoprolol succinate ER (TOPROL-XL) 50 MG 24 hr tablet  mirtazapine (REMERON) 30 MG tablet  tamsulosin (FLOMAX) 0.4 MG capsule  Turmeric 500 MG TABS          Review of Systems   Constitutional: Negative for activity change, appetite change and fever.   Eyes: Negative for photophobia and visual disturbance.   Respiratory: Negative for shortness of breath.    Cardiovascular: Negative for chest pain and palpitations.   Gastrointestinal: Negative for abdominal pain, nausea and vomiting.   Genitourinary: Negative for hematuria.        Known BPH.  Has some mild dribbling.   Musculoskeletal: Negative for back pain, neck pain and neck stiffness.   Skin: Negative.    Neurological:        See HPI.  No symptoms at rest.   Hematological: Bruises/bleeds easily.        She is on Plavix.       Physical Exam   BP: 132/74  Pulse: 56  Temp: 97.4  F (36.3  C)  Resp: 16  Height: 165.1 cm  "(5' 5\")  SpO2: 98 %      Physical Exam  Vitals signs and nursing note reviewed.   Constitutional:       General: He is not in acute distress.     Appearance: Normal appearance. He is normal weight. He is not ill-appearing, toxic-appearing or diaphoretic.   HENT:      Head: Normocephalic and atraumatic.   Neck:      Musculoskeletal: Normal range of motion and neck supple.   Cardiovascular:      Rate and Rhythm: Normal rate and regular rhythm.   Pulmonary:      Effort: Pulmonary effort is normal.      Breath sounds: Normal breath sounds.   Abdominal:      General: There is no distension.      Palpations: Abdomen is soft.      Tenderness: There is no abdominal tenderness. There is no guarding.   Musculoskeletal:      Right lower leg: No edema.      Left lower leg: No edema.   Skin:     General: Skin is warm and dry.      Capillary Refill: Capillary refill takes less than 2 seconds.   Neurological:      General: No focal deficit present.      Mental Status: He is alert and oriented to person, place, and time. Mental status is at baseline.      Comments: No focal concerns.  Normal conversation.   Psychiatric:         Mood and Affect: Mood normal.         ED Course        Procedures               Critical Care time:  none               Results for orders placed or performed during the hospital encounter of 02/18/21 (from the past 24 hour(s))   CBC with platelets differential   Result Value Ref Range    WBC 8.9 4.0 - 11.0 10e9/L    RBC Count 4.10 (L) 4.4 - 5.9 10e12/L    Hemoglobin 13.6 13.3 - 17.7 g/dL    Hematocrit 42.6 40.0 - 53.0 %     (H) 78 - 100 fl    MCH 33.2 (H) 26.5 - 33.0 pg    MCHC 31.9 31.5 - 36.5 g/dL    RDW 15.2 (H) 10.0 - 15.0 %    Platelet Count 199 150 - 450 10e9/L    Diff Method Automated Method     % Neutrophils 58.5 %    % Lymphocytes 23.3 %    % Monocytes 8.5 %    % Eosinophils 8.7 %    % Basophils 0.6 %    % Immature Granulocytes 0.4 %    Nucleated RBCs 0 0 /100    Absolute Neutrophil 5.2 1.6 " - 8.3 10e9/L    Absolute Lymphocytes 2.1 0.8 - 5.3 10e9/L    Absolute Monocytes 0.8 0.0 - 1.3 10e9/L    Absolute Eosinophils 0.8 (H) 0.0 - 0.7 10e9/L    Absolute Basophils 0.1 0.0 - 0.2 10e9/L    Abs Immature Granulocytes 0.0 0 - 0.4 10e9/L    Absolute Nucleated RBC 0.0    Troponin I   Result Value Ref Range    Troponin I ES <0.015 0.000 - 0.045 ug/L   Comprehensive metabolic panel   Result Value Ref Range    Sodium 141 133 - 144 mmol/L    Potassium 4.4 3.4 - 5.3 mmol/L    Chloride 110 (H) 94 - 109 mmol/L    Carbon Dioxide 28 20 - 32 mmol/L    Anion Gap 3 3 - 14 mmol/L    Glucose 94 70 - 99 mg/dL    Urea Nitrogen 20 7 - 30 mg/dL    Creatinine 1.02 0.66 - 1.25 mg/dL    GFR Estimate 68 >60 mL/min/[1.73_m2]    GFR Estimate If Black 78 >60 mL/min/[1.73_m2]    Calcium 8.7 8.5 - 10.1 mg/dL    Bilirubin Total 0.5 0.2 - 1.3 mg/dL    Albumin 3.3 (L) 3.4 - 5.0 g/dL    Protein Total 7.3 6.8 - 8.8 g/dL    Alkaline Phosphatase 128 40 - 150 U/L    ALT 27 0 - 70 U/L    AST 24 0 - 45 U/L   UA reflex to Microscopic   Result Value Ref Range    Color Urine Light Yellow     Appearance Urine Clear     Glucose Urine Negative NEG^Negative mg/dL    Bilirubin Urine Negative NEG^Negative    Ketones Urine Negative NEG^Negative mg/dL    Specific Gravity Urine 1.022 1.003 - 1.035    Blood Urine Negative NEG^Negative    pH Urine 5.5 4.7 - 8.0 pH    Protein Albumin Urine Negative NEG^Negative mg/dL    Urobilinogen mg/dL Normal 0.0 - 2.0 mg/dL    Nitrite Urine Negative NEG^Negative    Leukocyte Esterase Urine Small (A) NEG^Negative    Source Midstream Urine     RBC Urine 1 0 - 2 /HPF    WBC Urine 11 (H) 0 - 5 /HPF    Bacteria Urine Few (A) NEG^Negative /HPF    Squamous Epithelial /HPF Urine 1 0 - 1 /HPF    Mucous Urine Present (A) NEG^Negative /LPF       Medications - No data to display    Assessments & Plan (with Medical Decision Making)   I had a long detailed discussion with the patient and family member.  His heart rate is regular.  He has  been worked up for similar presentations on a multitude of occasions in the past.  He has known rather significant vascular disease including carotid stenosis.  He only has symptoms with position change.  We do long detailed discussion regarding age, vascular compromise, medications, vertebrobasilar insufficiency, and other etiologies of the patient's symptoms.  At this time in the emergency department, I can find no reasonable or compelling medical indication for advanced imaging or further work-up.  He does have some mild pyuria and with his increased frequency I do believe that it would fit any reasonable indication for treatment.  I believe that he can safely follow-up with his primary care provider and return here for any other questions or concerns.    This document was prepared using a combination of typing and voice generated software.  While every attempt was made for accuracy, spelling and grammatical errors may exist.    I have reviewed the nursing notes.    I have reviewed the findings, diagnosis, plan and need for follow up with the patient.       New Prescriptions    CEPHALEXIN (KEFLEX) 500 MG CAPSULE    Take 1 capsule (500 mg) by mouth 2 times daily for 7 days       Final diagnoses:   Postural dizziness   Pyuria       2/18/2021   [facility]     Deborah Cheng PA-C  02/18/21 [id number]

## 2021-02-18 NOTE — DISCHARGE INSTRUCTIONS
Your urinalysis shows signs of a mild urinary tract infection and I believe that it would be in your best interest to treat it.  I have prescribed Keflex.    As we discussed, your symptoms of dizziness with position change and ambulation can stem from a multitude of etiologies.  The most likely etiology is based on your vascular system, age, and medications.  It is reassuring that you do not have symptoms while at rest.  At this time in the emergency department, I can find no indication for advanced work-up or further evaluation.  This can be discussed with your primary care provider and further evaluation with vascular surgery can be discussed as an outpatient.  Please return here for any persistent symptoms, dizziness at rest, headaches, visual field loss, unilateral weakness, or other questions or concerns whatsoever.

## 2021-02-18 NOTE — ED NOTES
Discharge instructions reviewed with patient and his daughter.  rx has been e-scribed to pharmacy of choice.  Encouraged to return with new or worsening symptoms.  Copy of AVS in hand on discharge.  Pt daughter to give him ride home

## 2021-04-13 RX ORDER — TAMSULOSIN HYDROCHLORIDE 0.4 MG/1
0.8 CAPSULE ORAL DAILY
Qty: 30 CAPSULE | Refills: 0 | OUTPATIENT
Start: 2021-04-13

## 2021-07-14 NOTE — ED PROVIDER NOTES
History     Chief Complaint   Patient presents with     Fall     Laceration     HPI     Antonio Shoemaker is a 81 year old male who presents on stretcher via EMS from [facility] for evaluation of fall and head laceration. He was putting two 1/2 gallon bottles of davalos detergent in the basket of his walker. His walker fell forward with the weight of the detergent causing him to also fall forward causing him to hit his head on the floor. There was no loss of consciousness. He denies any vision changes, headaches, neck pain, nausea, vomiting. He is having some tenderness to area with laceration.    Allergies:  No Known Allergies    Problem List:    Patient Active Problem List    Diagnosis Date Noted     Heart failure with reduced ejection fraction, NYHA class II (H) 02/14/2019     Priority: Medium     Benign prostatic hyperplasia with urinary retention 06/27/2016     Priority: Medium     History of CVA (cerebrovascular accident) 06/27/2016     Priority: Medium     Status post total left knee replacement 06/27/2016     Priority: Medium     Carotid artery stenosis 09/24/2009     Priority: Medium     Dyslipidemia 07/29/2009     Priority: Medium     Encounter for long-term (current) use of other medications 07/29/2009     Priority: Medium     Incidental lung nodule 07/02/2008     Priority: Medium     Overview:   IMO Update 10/11       CVA (cerebrovascular accident) (H) 06/21/2008     Priority: Medium     Overview:   IMO Update 10/11       Osteoarthritis of knee 03/12/2008     Priority: Medium     Overview:   IMO Update 10/11       Ascites 07/28/2007     Priority: Medium     Overview:   IMO Update          Past Medical History:    History reviewed. No pertinent past medical history.    Past Surgical History:    Past Surgical History:   Procedure Laterality Date     COLONOSCOPY  8/12/2013    Procedure: COLONOSCOPY;   Upper Endoscopy w/ biopsies and COLONOSCOPY;  Surgeon: Sheri Joseph DO;  Location: [facility] OR     NASAL  SCOPE,BX/RMV POLYP/DEBRID       ORTHOPEDIC SURGERY      knee     VASCULAR SURGERY      carotid       Family History:    No family history on file.    Social History:  Marital Status:   [5]  Social History     Tobacco Use     Smoking status: Former Smoker     Smokeless tobacco: Never Used   Substance Use Topics     Alcohol use: No     Drug use: No        Medications:    aspirin 81 MG EC tablet  Clopidogrel Bisulfate (PLAVIX PO)  atorvastatin (LIPITOR) 40 MG tablet  bimatoprost (LUMIGAN) 0.01 % SOLN  dorzolamide (TRUSOPT) 2 % ophthalmic solution  finasteride (PROSCAR) 5 MG tablet  furosemide (LASIX) 20 MG tablet  Garlic 100 MG TABS  latanoprost (XALATAN) 0.005 % ophthalmic solution  lisinopril (PRINIVIL/ZESTRIL) 5 MG tablet  melatonin 3 MG CAPS  metoprolol succinate ER (TOPROL-XL) 50 MG 24 hr tablet  mirtazapine (REMERON) 30 MG tablet  tamsulosin (FLOMAX) 0.4 MG capsule  Turmeric 500 MG TABS          Review of Systems   HENT: Negative for nosebleeds.    Eyes: Negative for visual disturbance.   Respiratory: Negative for shortness of breath.    Cardiovascular: Negative for chest pain.   Gastrointestinal: Negative for abdominal pain.   Musculoskeletal: Negative for arthralgias, back pain, myalgias and neck pain.   Skin: Positive for wound (right side of head- covered by assisted living staff).   Neurological: Negative for dizziness, syncope, weakness, light-headedness, numbness and headaches.       Physical Exam   BP: 154/79  Pulse: 77  Temp: 97.8  F (36.6  C)  Resp: 16  Weight: 79.4 kg (175 lb)  SpO2: 98 %      Physical Exam  Constitutional:       General: He is not in acute distress.     Appearance: Normal appearance. He is not ill-appearing or toxic-appearing.   HENT:      Head: Normocephalic. Contusion and laceration present. No raccoon eyes or Daniel's sign.        Right Ear: Tympanic membrane, ear canal and external ear normal.      Left Ear: Tympanic membrane, ear canal and external ear normal.      Nose:  Nose normal.      Right Nostril: No epistaxis.      Left Nostril: No epistaxis.      Mouth/Throat:      Lips: Pink.      Mouth: Mucous membranes are moist.      Tongue: Tongue does not deviate from midline.      Pharynx: Oropharynx is clear. Uvula midline.   Eyes:      General: Lids are normal.      Extraocular Movements: Extraocular movements intact.      Conjunctiva/sclera: Conjunctivae normal.      Pupils: Pupils are equal, round, and reactive to light.   Cardiovascular:      Rate and Rhythm: Normal rate and regular rhythm.      Heart sounds: S1 normal and S2 normal. No murmur heard.   No friction rub. No gallop.    Pulmonary:      Effort: Pulmonary effort is normal.      Breath sounds: Normal breath sounds.   Musculoskeletal:      Cervical back: Full passive range of motion without pain. No spinous process tenderness or muscular tenderness.      Right lower leg: No edema.      Left lower leg: No edema.      Comments: FROM of upper and lower extremities   Skin:     General: Skin is warm and dry.      Capillary Refill: Capillary refill takes less than 2 seconds.   Neurological:      Mental Status: He is alert and oriented to person, place, and time.      Cranial Nerves: Cranial nerves are intact.   Psychiatric:         Mood and Affect: Mood normal.         Behavior: Behavior is cooperative.         ED Course        Procedures           Results for orders placed or performed during the hospital encounter of 07/14/21 (from the past 24 hour(s))   Head CT w/o contrast    Narrative    PROCEDURE: CT HEAD W/O CONTRAST     HISTORY: Trauma - Head Injury.    COMPARISON: None.    TECHNIQUE:  Helical images of the head from the foramen magnum to the  vertex were obtained without contrast.    FINDINGS: There is an old lacunar infarct seen in the anterior limb of  the internal capsule on the left. The ventricular system is normal in  size. There is white matter low density in both hemispheres consistent  with small vessel  disease. The brainstem appears normal. There is an  old left cerebellar hemisphere infarct. The cranial vault is intact.   The visualized paranasal sinuses are clear.      Impression    IMPRESSION: No acute brain abnormality.      JASPAL GILLIS MD         SYSTEM ID:  [id number]       Medications   lidocaine/EPINEPHrine/tetracaine (LET) solution KIT (5 mLs Topical Given 7/14/21 2377)       Assessments & Plan (with Medical Decision Making)     I have reviewed the nursing notes.    I have reviewed the findings, diagnosis, plan and need for follow up with the patient.  (W19.XXXA) Fall, initial encounter  (primary encounter diagnosis)  (S01.111A) Laceration of eyelid of right eye without foreign body, initial encounter  Very pleasant 81-year old male presents in no acute distress on stretcher from [facility] assisted living for evaluation of head laceration. He had purely mechanical fall - filled basket of walker with two 1/2 gallons of laundry soap resulting in his walker to fall forward and then he fell forward. He did hit his head on the floor. No LOC. No concerns in the ED. CT head is negative. Lac repair with four 6-0 sutures. Plan for discharge home and symptomatic treatments.  Recommend:  - acetaminophen (Tylenol) 500-650 mg every 4-6 hours for discomfor  - Ice pack for discomfort  - Can keep wound open to air once dressing is removed in 24 hours  - Suture removal in 5-7 days        RETURN TO THE ED WITH NEW OR WORSENING SYMPTOMS.      Yuridia Howe CNP    Discharge Medication List as of 7/14/2021  7:06 PM          Final diagnoses:   Fall, initial encounter   Laceration of eyelid of right eye without foreign body, initial encounter       7/14/2021   [facility]     Yuridia Howe CNP  07/14/21 [id number]

## 2021-07-14 NOTE — DISCHARGE INSTRUCTIONS
(W19.XXXA) Fall, initial encounter  (primary encounter diagnosis)  (S01.111A) Laceration of eyelid of right eye without foreign body, initial encounter  Very pleasant 81-year old male presents in no acute distress on stretcher from [facility] assisted living for evaluation of head laceration. He had purely mechanical fall - filled basket of walker with two 1/2 gallons of laundry soap resulting in his walker to fall forward and then he fell forward. He did hit his head on the floor. No LOC. No concerns in the ED. CT head is negative. Lac repair with four 6-0 sutures. Plan for discharge home and symptomatic treatments.  Recommend:  - acetaminophen (Tylenol) 500-650 mg every 4-6 hours for discomfor  - Ice pack for discomfort  - Can keep wound open to air once dressing is removed in 24 hours  - Suture removal in 5-7 days        RETURN TO THE ED WITH NEW OR WORSENING SYMPTOMS.      Yuridia Howe CNP      Results for orders placed or performed during the hospital encounter of 07/14/21   Head CT w/o contrast     Status: None    Narrative    PROCEDURE: CT HEAD W/O CONTRAST     HISTORY: Trauma - Head Injury.    COMPARISON: None.    TECHNIQUE:  Helical images of the head from the foramen magnum to the  vertex were obtained without contrast.    FINDINGS: There is an old lacunar infarct seen in the anterior limb of  the internal capsule on the left. The ventricular system is normal in  size. There is white matter low density in both hemispheres consistent  with small vessel disease. The brainstem appears normal. There is an  old left cerebellar hemisphere infarct. The cranial vault is intact.   The visualized paranasal sinuses are clear.      Impression    IMPRESSION: No acute brain abnormality.      JASPAL GILLIS MD         SYSTEM ID:  [id number]

## 2021-07-15 NOTE — ED NOTES
Face to face report given with opportunity to observe patient.    Report given to CHRISTIANA Gracia RN   7/14/2021  7:00 PM

## 2021-07-15 NOTE — ED NOTES
Wound dressed with bacitracin, telfa, roller gauze, and coban. Pt to  and discharged to daughter. Facility aware pt is on his way back.

## 2021-07-22 NOTE — ED PROVIDER NOTES
"  History     Chief Complaint   Patient presents with     Dizziness     HPI  Antonio Shoemaker is a 81 year old male who states that he has been \"dizzy\" and \"off balance\" for the past few months.  He states for the past few days it seems of gotten worse.  He did have a fall a week ago.  He is increasingly concerned that he may fall again.  He states is not having a headache.  He does have problems with his vision.  He has a partial visual defect from a previous CVA.  He also has visual problems as result of a delayed glaucoma surgery.  The patient states is not having headaches.  He denies any pain in his chest and is not been having palpitations.  He is not short of breath.  He denies any abdominal pain.  He has had no nausea or vomiting.  He states he does have chronic back pain.  He states within the past few days he has had some numbness and tingling radiating into his thighs.  He denies saddle anesthesia.  He denies incontinence.  He denies change in his bowel or bladder habits.  He has not had cough fevers or chills.  He and his daughter relate that the patient does occasionally get urinary tract infections and he would like to be evaluated for that as well.    Allergies:  No Known Allergies    Problem List:    Patient Active Problem List    Diagnosis Date Noted     Heart failure with reduced ejection fraction, NYHA class II (H) 02/14/2019     Priority: Medium     Benign prostatic hyperplasia with urinary retention 06/27/2016     Priority: Medium     History of CVA (cerebrovascular accident) 06/27/2016     Priority: Medium     Status post total left knee replacement 06/27/2016     Priority: Medium     Carotid artery stenosis 09/24/2009     Priority: Medium     Dyslipidemia 07/29/2009     Priority: Medium     Encounter for long-term (current) use of other medications 07/29/2009     Priority: Medium     Incidental lung nodule 07/02/2008     Priority: Medium     Overview:   IMO Update 10/11       CVA " (cerebrovascular accident) (H) 06/21/2008     Priority: Medium     Overview:   IMO Update 10/11       Osteoarthritis of knee 03/12/2008     Priority: Medium     Overview:   IMO Update 10/11       Ascites 07/28/2007     Priority: Medium     Overview:   IMO Update          Past Medical History:    No past medical history on file.    Past Surgical History:    Past Surgical History:   Procedure Laterality Date     COLONOSCOPY  8/12/2013    Procedure: COLONOSCOPY;   Upper Endoscopy w/ biopsies and COLONOSCOPY;  Surgeon: Sheri Joseph DO;  Location: [facility]     NASAL SCOPE,BX/RMV POLYP/DEBRID       ORTHOPEDIC SURGERY      knee     VASCULAR SURGERY      carotid       Family History:    No family history on file.    Social History:  Marital Status:   [5]  Social History     Tobacco Use     Smoking status: Former Smoker     Smokeless tobacco: Never Used   Substance Use Topics     Alcohol use: No     Drug use: No        Medications:    nitroFURantoin macrocrystal-monohydrate (MACROBID) 100 MG capsule  aspirin 81 MG EC tablet  atorvastatin (LIPITOR) 40 MG tablet  bimatoprost (LUMIGAN) 0.01 % SOLN  Clopidogrel Bisulfate (PLAVIX PO)  dorzolamide (TRUSOPT) 2 % ophthalmic solution  finasteride (PROSCAR) 5 MG tablet  furosemide (LASIX) 20 MG tablet  Garlic 100 MG TABS  latanoprost (XALATAN) 0.005 % ophthalmic solution  lisinopril (PRINIVIL/ZESTRIL) 5 MG tablet  melatonin 3 MG CAPS  metoprolol succinate ER (TOPROL-XL) 50 MG 24 hr tablet  mirtazapine (REMERON) 30 MG tablet  tamsulosin (FLOMAX) 0.4 MG capsule  Turmeric 500 MG TABS          Review of Systems   Constitutional: Negative.    HENT: Negative.    Eyes: Negative.    Respiratory: Negative.    Cardiovascular: Negative.    Gastrointestinal: Negative.    Endocrine: Negative.    Genitourinary: Negative.    Musculoskeletal: Positive for back pain and gait problem.   Skin: Negative.    Neurological: Positive for dizziness.   Please see history of chief complaint.  All other  systems reviewed and found unremarkable.    Physical Exam   BP: 126/64  Pulse: 68  Temp: (!) 96.7  F (35.9  C)  Resp: 16  SpO2: 97 %      Physical Exam is an 81-year-old gentleman who is awake alert oriented person place and time.  He is very pleasant and cooperative with my exam.  He does not appear in acute distress.  He is accompanied to the emergency department by his daughter.  HEENT normocephalic patient has a healing laceration above his right eyebrow and there is some old bruising extraocular muscles intact pupils equally round and reactive to light.  Tongue midline palate intact her pharynx is clear.  Neck is supple is range of motion without pain or palpable tenderness.  There is no JVD.  Lungs are clear bilaterally.  Heart maintains a regular rate and rhythm S1 and S2 sounds are appreciated.  There is no murmur.  The abdomen is soft is nontender no mass no organomegaly rebound.  Extremities have full range of motion and 5/5 strength brisk refill pulses brisk capillary refill no sensory deficit in the extremities.  Neurologic exam no focal cranial nerve deficits appreciated.  Dermatologic exam there are no diffuse skin rashes or lesions noted.  ED Course      The patient remained very stable throughout his stay in the emergency department.  I discussed CT and lab findings with the patient and his daughter.  The patient will be discharged to the care of his daughter.  I will prescribe Macrobid.  Other appropriate discharge instructions are given.  I will advise that the patient be rechecked by his primary care provider as soon as possible next week.           {EKG done and interpreted by myself.  It shows a normal sinus rhythm.  The ventricular rate 63 beats minute.  There is a first-degree AV block.  The MO interval is 220 ms.  Corrected QT is 442 ms.  There is no ST segment depression or elevation.  There is T wave inversion in leads III and aVF.  These are not new changes when compared to previous EKGs.   There does not appear to be evidence of acute ischemia today.               Results for orders placed or performed during the hospital encounter of 07/22/21 (from the past 24 hour(s))   CBC with Platelets & Differential    Narrative    The following orders were created for panel order CBC with Platelets & Differential.  Procedure                               Abnormality         Status                     ---------                               -----------         ------                     CBC with platelets and d...[[id number]]  Abnormal            Final result                 Please view results for these tests on the individual orders.   Basic metabolic panel   Result Value Ref Range    Sodium 141 133 - 144 mmol/L    Potassium 4.8 3.4 - 5.3 mmol/L    Chloride 109 94 - 109 mmol/L    Carbon Dioxide (CO2) 29 20 - 32 mmol/L    Anion Gap 3 3 - 14 mmol/L    Urea Nitrogen 16 7 - 30 mg/dL    Creatinine 0.99 0.66 - 1.25 mg/dL    Calcium 8.6 8.5 - 10.1 mg/dL    Glucose 107 (H) 70 - 99 mg/dL    GFR Estimate 70 >60 mL/min/1.73m2   Troponin I   Result Value Ref Range    Troponin I <0.015 0.000 - 0.045 ug/L   CBC with platelets and differential   Result Value Ref Range    WBC Count 7.1 4.0 - 11.0 10e3/uL    RBC Count 4.17 (L) 4.40 - 5.90 10e6/uL    Hemoglobin 13.7 13.3 - 17.7 g/dL    Hematocrit 42.6 40.0 - 53.0 %     (H) 78 - 100 fL    MCH 32.9 26.5 - 33.0 pg    MCHC 32.2 31.5 - 36.5 g/dL    RDW 15.2 (H) 10.0 - 15.0 %    Platelet Count 189 150 - 450 10e3/uL    % Neutrophils 49 %    % Lymphocytes 31 %    % Monocytes 10 %    % Eosinophils 9 %    % Basophils 1 %    % Immature Granulocytes 0 %    NRBCs per 100 WBC 0 <1 /100    Absolute Neutrophils 3.6 1.6 - 8.3 10e3/uL    Absolute Lymphocytes 2.2 0.8 - 5.3 10e3/uL    Absolute Monocytes 0.7 0.0 - 1.3 10e3/uL    Absolute Eosinophils 0.6 0.0 - 0.7 10e3/uL    Absolute Basophils 0.0 0.0 - 0.2 10e3/uL    Absolute Immature Granulocytes 0.0 <=0.0 10e3/uL    Absolute NRBCs 0.0  10e3/uL   Nt probnp inpatient (BNP)   Result Value Ref Range    N terminal Pro BNP Inpatient 540 0-1,800 pg/mL   CT Head w/o Contrast    Narrative    PROCEDURE: CT HEAD W/O CONTRAST     HISTORY: Dizziness, non-specific.    COMPARISON: July 14, 2021    TECHNIQUE:  Helical images of the head from the foramen magnum to the  vertex were obtained without contrast.    FINDINGS: There is an old lacunar infarct seen in the anterior limb of  the internal capsule on the left. There is an old left occipital  infarct. There is an old left cerebellar hemisphere infarct. There is  white matter low density in both hemispheres consistent with small  vessel disease.  The visualized paranasal sinuses are clear.      Impression    IMPRESSION: No acute brain abnormality. Old infarcts are seen in the  left hemisphere as well as within the cerebellum on the left.      JASPAL GILLIS MD         SYSTEM ID:  [id number]   Lumbar spine CT w/o contrast    Narrative    PROCEDURE: CT LUMBAR SPINE W/O CONTRAST 7/22/2021 3:36 PM    HISTORY: Low back pain, progressive neurologic deficit    COMPARISONS: None.    Meds/Dose Given:    TECHNIQUE: CT scan of the lumbar spine with sagittal coronal  reconstructions    FINDINGS: There are bridging osteophytes seen at T12-L1 and L1-L2.  There is some neural foraminal narrowing due to a posterior lateral  osteophyte on the left impinging on the left L1 nerve root.     At L2-L3 there is broad-based annular bulging. There are facet joint  degenerative changes. There are posterolateral osteophytes. There is  moderate compression of both L2 nerve roots in the neural foramina  bilaterally. Posterior osteophytes impinge on the L3 nerve roots  bilaterally in the lateral recesses.    There is decrease in height in the L3-L4 disc, severe facet joint  degenerative changes and broad-based annular bulging. There is  moderate compression of the left L3 nerve root in the neural foramina.  There is mild compression of the  right L3 nerve root in the neural  foramina. There is mild impingement on the left L4 nerve root in the  lateral recess.    At L4-L5 there are severe degenerative changes in the facet joints.  There is nonspondylolytic spondylolisthesis of L4 on L5. There is  broad-based annular bulging. There is moderate compression of both L4  nerve roots in the L4-L5 neural foramina and this mild compression of  the right L5 nerve root in the lateral recess.    At L5-S1 there is broad-based annular bulging facet joint degenerative  change and posterior lateral osteophytes. There is moderate  compression of the left L5 nerve root in the neural foramina.    Severe degenerative changes are noted in the sacroiliac joints. The  paravertebral soft tissues appear normal.         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 with multiple nerve root compressions as described  above    JASPAL GILLIS MD         SYSTEM ID:  [id number]   UA with Microscopic reflex to Culture    Specimen: Urine, Midstream   Result Value Ref Range    Color Urine Light Yellow Colorless, Straw, Light Yellow, Yellow    Appearance Urine Clear Clear    Glucose Urine Negative Negative mg/dL    Bilirubin Urine Negative Negative    Ketones Urine Negative Negative mg/dL    Specific Gravity Urine 1.012 1.003 - 1.035    Blood Urine Negative Negative    pH Urine 5.5 4.7 - 8.0    Protein Albumin Urine Negative Negative mg/dL    Urobilinogen Urine Normal Normal, 2.0 mg/dL    Nitrite Urine Negative Negative    Leukocyte Esterase Urine Moderate (A) Negative    Mucus Urine Present (A) None Seen /LPF    RBC Urine 2 <=2 /HPF    WBC Urine 5 <=5 /HPF    Squamous Epithelials Urine 1 <=1 /HPF    Narrative    Urine Culture ordered based on laboratory criteria       Medications - No data to display    Assessments & Plan (with Medical Decision Making)     I have reviewed the nursing notes.    I have reviewed the findings, diagnosis,  plan and need for follow up with the patient.  The plan is to discharge the instruction with appropriate prescriptions, discharge and follow-up instructions.    New Prescriptions    NITROFURANTOIN MACROCRYSTAL-MONOHYDRATE (MACROBID) 100 MG CAPSULE    Take 1 capsule (100 mg) by mouth 2 times daily       Final diagnoses:   Dizziness   Acute cystitis without hematuria       7/22/2021   [facility]     Burke Childs,   07/22/21 3031

## 2021-07-22 NOTE — ED TRIAGE NOTES
Pt has hx of dizziness for the past few months and worse today. Pt voices concern he is scared to fall.  Family stated UTI will make his symptoms worse.  Pt resides at [facility] [place], [place]

## 2021-07-22 NOTE — ED NOTES
Care Transitions focused note:      Chart reviewed, met with patient and his daughter who came to the ED with dizziness and has been falling.    Pt has been residing at [facility] assisted living facility in [place].  Was seen here again on July 14th for a fall as well.    Pt has meals provided, medication management, lifeline button.  Uses a walker at home.  Has a shower chair but does not have any supervision for showering.    Discussed reducing fall risks, physical therapy and the option of going to the nursing home for some rehab.    Pt is involved in an exercise program at his facility.    They are not interested at this time in making any changes.  He is alert and oriented and dtr states he does make all of his own decisions.  He stated he is comfortable at this time with his current situation.    Will follow and assist as needed.    RASHEEDA Hernandez

## 2022-01-01 ENCOUNTER — APPOINTMENT (OUTPATIENT)
Dept: CT IMAGING | Facility: HOSPITAL | Age: 82
DRG: 884 | End: 2022-01-01
Attending: PHYSICIAN ASSISTANT
Payer: MEDICARE

## 2022-01-01 ENCOUNTER — APPOINTMENT (OUTPATIENT)
Dept: OCCUPATIONAL THERAPY | Facility: HOSPITAL | Age: 82
DRG: 884 | End: 2022-01-01
Attending: INTERNAL MEDICINE
Payer: MEDICARE

## 2022-01-01 ENCOUNTER — APPOINTMENT (OUTPATIENT)
Dept: MRI IMAGING | Facility: HOSPITAL | Age: 82
DRG: 884 | End: 2022-01-01
Attending: INTERNAL MEDICINE
Payer: MEDICARE

## 2022-01-01 ENCOUNTER — HOSPITAL ENCOUNTER (OUTPATIENT)
Dept: CT IMAGING | Facility: HOSPITAL | Age: 82
Discharge: HOME | End: 2022-05-12
Attending: FAMILY MEDICINE | Admitting: FAMILY MEDICINE
Payer: MEDICARE

## 2022-01-01 ENCOUNTER — HOSPITAL ENCOUNTER (INPATIENT)
Facility: HOSPITAL | Age: 82
LOS: 2 days | DRG: 884 | End: 2022-05-18
Attending: PHYSICIAN ASSISTANT | Admitting: INTERNAL MEDICINE
Payer: MEDICARE

## 2022-01-01 ENCOUNTER — APPOINTMENT (OUTPATIENT)
Dept: GENERAL RADIOLOGY | Facility: HOSPITAL | Age: 82
DRG: 884 | End: 2022-01-01
Attending: PHYSICIAN ASSISTANT
Payer: MEDICARE

## 2022-01-01 ENCOUNTER — MEDICAL CORRESPONDENCE (OUTPATIENT)
Dept: CT IMAGING | Facility: HOSPITAL | Age: 82
End: 2022-01-01
Payer: COMMERCIAL

## 2022-01-01 ENCOUNTER — HOSPITAL ENCOUNTER (OUTPATIENT)
Dept: CT IMAGING | Facility: HOSPITAL | Age: 82
Discharge: HOME | End: 2022-04-12
Attending: FAMILY MEDICINE | Admitting: FAMILY MEDICINE
Payer: MEDICARE

## 2022-01-01 ENCOUNTER — APPOINTMENT (OUTPATIENT)
Dept: SPEECH THERAPY | Facility: HOSPITAL | Age: 82
DRG: 884 | End: 2022-01-01
Attending: INTERNAL MEDICINE
Payer: MEDICARE

## 2022-01-01 ENCOUNTER — TRANSFERRED RECORDS (OUTPATIENT)
Dept: HEALTH INFORMATION MANAGEMENT | Facility: CLINIC | Age: 82
End: 2022-01-01

## 2022-01-01 VITALS
DIASTOLIC BLOOD PRESSURE: 50 MMHG | HEIGHT: 65 IN | SYSTOLIC BLOOD PRESSURE: 100 MMHG | WEIGHT: 173.28 LBS | BODY MASS INDEX: 28.87 KG/M2 | HEART RATE: 82 BPM | RESPIRATION RATE: 14 BRPM | TEMPERATURE: 97.2 F | OXYGEN SATURATION: 92 %

## 2022-01-01 DIAGNOSIS — I95.9 HYPOTENSION, UNSPECIFIED HYPOTENSION TYPE: ICD-10-CM

## 2022-01-01 DIAGNOSIS — R41.89 OTHER SYMPTOMS AND SIGNS INVOLVING COGNITIVE FUNCTIONS AND AWARENESS: ICD-10-CM

## 2022-01-01 DIAGNOSIS — N40.1 BENIGN PROSTATIC HYPERPLASIA WITH LOWER URINARY TRACT SYMPTOMS: ICD-10-CM

## 2022-01-01 DIAGNOSIS — R41.82 ALTERED MENTAL STATUS: ICD-10-CM

## 2022-01-01 DIAGNOSIS — T68.XXXA HYPOTHERMIA, INITIAL ENCOUNTER: ICD-10-CM

## 2022-01-01 DIAGNOSIS — R41.0 DISORIENTATION, UNSPECIFIED: ICD-10-CM

## 2022-01-01 LAB
ALBUMIN SERPL-MCNC: 2.4 G/DL (ref 3.4–5)
ALBUMIN SERPL-MCNC: 2.8 G/DL (ref 3.4–5)
ALBUMIN UR-MCNC: NEGATIVE MG/DL
ALP SERPL-CCNC: 144 U/L (ref 40–150)
ALP SERPL-CCNC: 179 U/L (ref 40–150)
ALT SERPL W P-5'-P-CCNC: 136 U/L (ref 0–70)
ALT SERPL W P-5'-P-CCNC: 98 U/L (ref 0–70)
AMMONIA PLAS-SCNC: 24 UMOL/L (ref 10–50)
ANION GAP SERPL CALCULATED.3IONS-SCNC: 3 MMOL/L (ref 3–14)
ANION GAP SERPL CALCULATED.3IONS-SCNC: 3 MMOL/L (ref 3–14)
APPEARANCE UR: CLEAR
AST SERPL W P-5'-P-CCNC: 62 U/L (ref 0–45)
AST SERPL W P-5'-P-CCNC: 98 U/L (ref 0–45)
BACTERIA #/AREA URNS HPF: ABNORMAL /HPF
BACTERIA UR CULT: ABNORMAL
BASE EXCESS BLDV CALC-SCNC: -0.9 MMOL/L (ref -7.7–1.9)
BASOPHILS # BLD AUTO: 0 10E3/UL (ref 0–0.2)
BASOPHILS # BLD AUTO: 0 10E3/UL (ref 0–0.2)
BASOPHILS NFR BLD AUTO: 0 %
BASOPHILS NFR BLD AUTO: 0 %
BILIRUB SERPL-MCNC: 0.4 MG/DL (ref 0.2–1.3)
BILIRUB SERPL-MCNC: 0.5 MG/DL (ref 0.2–1.3)
BILIRUB UR QL STRIP: NEGATIVE
BUN SERPL-MCNC: 25 MG/DL (ref 7–30)
BUN SERPL-MCNC: 28 MG/DL (ref 7–30)
CALCIUM SERPL-MCNC: 7.9 MG/DL (ref 8.5–10.1)
CALCIUM SERPL-MCNC: 8.7 MG/DL (ref 8.5–10.1)
CHLORIDE BLD-SCNC: 113 MMOL/L (ref 94–109)
CHLORIDE BLD-SCNC: 116 MMOL/L (ref 94–109)
CK SERPL-CCNC: 41 U/L (ref 30–300)
CO2 SERPL-SCNC: 25 MMOL/L (ref 20–32)
CO2 SERPL-SCNC: 26 MMOL/L (ref 20–32)
COLOR UR AUTO: ABNORMAL
CORTIS SERPL-MCNC: 9.9 UG/DL (ref 4–22)
CREAT BLD-MCNC: 1.1 MG/DL (ref 0.7–1.3)
CREAT SERPL-MCNC: 0.96 MG/DL (ref 0.66–1.25)
CREAT SERPL-MCNC: 1.03 MG/DL (ref 0.66–1.25)
CRP SERPL-MCNC: 18.7 MG/L (ref 0–8)
DEPRECATED CALCIDIOL+CALCIFEROL SERPL-MC: 16 UG/L (ref 20–75)
EOSINOPHIL # BLD AUTO: 0.2 10E3/UL (ref 0–0.7)
EOSINOPHIL # BLD AUTO: 0.3 10E3/UL (ref 0–0.7)
EOSINOPHIL NFR BLD AUTO: 3 %
EOSINOPHIL NFR BLD AUTO: 4 %
ERYTHROCYTE [DISTWIDTH] IN BLOOD BY AUTOMATED COUNT: 17.4 % (ref 10–15)
ERYTHROCYTE [DISTWIDTH] IN BLOOD BY AUTOMATED COUNT: 17.8 % (ref 10–15)
FOLATE SERPL-MCNC: 5.3 NG/ML
GFR SERPL CREATININE-BSD FRML MDRD: 72 ML/MIN/1.73M2
GFR SERPL CREATININE-BSD FRML MDRD: 78 ML/MIN/1.73M2
GFR SERPL CREATININE-BSD FRML MDRD: >60 ML/MIN/1.73M2
GLUCOSE BLD-MCNC: 119 MG/DL (ref 70–99)
GLUCOSE BLD-MCNC: 66 MG/DL (ref 70–99)
GLUCOSE BLDC GLUCOMTR-MCNC: 105 MG/DL (ref 70–99)
GLUCOSE BLDC GLUCOMTR-MCNC: 114 MG/DL (ref 70–99)
GLUCOSE BLDC GLUCOMTR-MCNC: 67 MG/DL (ref 70–99)
GLUCOSE BLDC GLUCOMTR-MCNC: 78 MG/DL (ref 70–99)
GLUCOSE UR STRIP-MCNC: NEGATIVE MG/DL
HCO3 BLDV-SCNC: 26 MMOL/L (ref 21–28)
HCT VFR BLD AUTO: 34.4 % (ref 40–53)
HCT VFR BLD AUTO: 39.8 % (ref 40–53)
HGB BLD-MCNC: 11.4 G/DL (ref 13.3–17.7)
HGB BLD-MCNC: 13 G/DL (ref 13.3–17.7)
HGB UR QL STRIP: NEGATIVE
HOLD SPECIMEN: NORMAL
IMM GRANULOCYTES # BLD: 0 10E3/UL
IMM GRANULOCYTES # BLD: 0 10E3/UL
IMM GRANULOCYTES NFR BLD: 0 %
IMM GRANULOCYTES NFR BLD: 0 %
IRON SATN MFR SERPL: 32 % (ref 15–46)
IRON SERPL-MCNC: 70 UG/DL (ref 35–180)
KETONES UR STRIP-MCNC: NEGATIVE MG/DL
LACTATE SERPL-SCNC: 0.8 MMOL/L (ref 0.7–2)
LACTATE SERPL-SCNC: 1.5 MMOL/L (ref 0.7–2)
LEUKOCYTE ESTERASE UR QL STRIP: ABNORMAL
LIPASE SERPL-CCNC: 181 U/L (ref 73–393)
LYMPHOCYTES # BLD AUTO: 1.2 10E3/UL (ref 0.8–5.3)
LYMPHOCYTES # BLD AUTO: 1.4 10E3/UL (ref 0.8–5.3)
LYMPHOCYTES NFR BLD AUTO: 19 %
LYMPHOCYTES NFR BLD AUTO: 21 %
MCH RBC QN AUTO: 33.6 PG (ref 26.5–33)
MCH RBC QN AUTO: 34.3 PG (ref 26.5–33)
MCHC RBC AUTO-ENTMCNC: 32.7 G/DL (ref 31.5–36.5)
MCHC RBC AUTO-ENTMCNC: 33.1 G/DL (ref 31.5–36.5)
MCV RBC AUTO: 103 FL (ref 78–100)
MCV RBC AUTO: 104 FL (ref 78–100)
MONOCYTES # BLD AUTO: 0.4 10E3/UL (ref 0–1.3)
MONOCYTES # BLD AUTO: 0.4 10E3/UL (ref 0–1.3)
MONOCYTES NFR BLD AUTO: 6 %
MONOCYTES NFR BLD AUTO: 7 %
NEUTROPHILS # BLD AUTO: 4.3 10E3/UL (ref 1.6–8.3)
NEUTROPHILS # BLD AUTO: 4.8 10E3/UL (ref 1.6–8.3)
NEUTROPHILS NFR BLD AUTO: 69 %
NEUTROPHILS NFR BLD AUTO: 71 %
NITRATE UR QL: NEGATIVE
NRBC # BLD AUTO: 0 10E3/UL
NRBC # BLD AUTO: 0 10E3/UL
NRBC BLD AUTO-RTO: 0 /100
NRBC BLD AUTO-RTO: 0 /100
NT-PROBNP SERPL-MCNC: 647 PG/ML (ref 0–1800)
O2/TOTAL GAS SETTING VFR VENT: 21 %
OXYHGB MFR BLDV: 76 % (ref 70–75)
PCO2 BLDV: 51 MM HG (ref 40–50)
PH BLDV: 7.31 [PH] (ref 7.32–7.43)
PH UR STRIP: 5.5 [PH] (ref 4.7–8)
PLATELET # BLD AUTO: 154 10E3/UL (ref 150–450)
PLATELET # BLD AUTO: 179 10E3/UL (ref 150–450)
PO2 BLDV: 46 MM HG (ref 25–47)
POTASSIUM BLD-SCNC: 4.4 MMOL/L (ref 3.4–5.3)
POTASSIUM BLD-SCNC: 4.8 MMOL/L (ref 3.4–5.3)
PREALB SERPL IA-MCNC: 12 MG/DL (ref 15–45)
PROCALCITONIN SERPL-MCNC: <0.05 NG/ML
PROT SERPL-MCNC: 6 G/DL (ref 6.8–8.8)
PROT SERPL-MCNC: 7.5 G/DL (ref 6.8–8.8)
RBC # BLD AUTO: 3.32 10E6/UL (ref 4.4–5.9)
RBC # BLD AUTO: 3.87 10E6/UL (ref 4.4–5.9)
RBC URINE: 8 /HPF
SARS-COV-2 RNA RESP QL NAA+PROBE: NEGATIVE
SODIUM SERPL-SCNC: 142 MMOL/L (ref 133–144)
SODIUM SERPL-SCNC: 144 MMOL/L (ref 133–144)
SP GR UR STRIP: 1.02 (ref 1–1.03)
SQUAMOUS EPITHELIAL: 0 /HPF
T4 FREE SERPL-MCNC: 1.05 NG/DL (ref 0.76–1.46)
TIBC SERPL-MCNC: 222 UG/DL (ref 240–430)
TROPONIN I SERPL HS-MCNC: 63 NG/L
TROPONIN I SERPL HS-MCNC: 68 NG/L
TSH SERPL DL<=0.005 MIU/L-ACNC: 5.25 MU/L (ref 0.4–4)
UROBILINOGEN UR STRIP-MCNC: NORMAL MG/DL
VIT B12 SERPL-MCNC: 815 PG/ML (ref 193–986)
WBC # BLD AUTO: 6.1 10E3/UL (ref 4–11)
WBC # BLD AUTO: 7 10E3/UL (ref 4–11)
WBC URINE: 4 /HPF

## 2022-01-01 PROCEDURE — 84443 ASSAY THYROID STIM HORMONE: CPT | Performed by: PHYSICIAN ASSISTANT

## 2022-01-01 PROCEDURE — 94640 AIRWAY INHALATION TREATMENT: CPT

## 2022-01-01 PROCEDURE — 70470 CT HEAD/BRAIN W/O & W/DYE: CPT

## 2022-01-01 PROCEDURE — 85025 COMPLETE CBC W/AUTO DIFF WBC: CPT | Performed by: PHYSICIAN ASSISTANT

## 2022-01-01 PROCEDURE — 250N000013 HC RX MED GY IP 250 OP 250 PS 637: Performed by: INTERNAL MEDICINE

## 2022-01-01 PROCEDURE — 258N000003 HC RX IP 258 OP 636: Performed by: PHYSICIAN ASSISTANT

## 2022-01-01 PROCEDURE — 80053 COMPREHEN METABOLIC PANEL: CPT | Performed by: PHYSICIAN ASSISTANT

## 2022-01-01 PROCEDURE — 250N000011 HC RX IP 250 OP 636: Performed by: RADIOLOGY

## 2022-01-01 PROCEDURE — 83690 ASSAY OF LIPASE: CPT | Performed by: PHYSICIAN ASSISTANT

## 2022-01-01 PROCEDURE — G1004 CDSM NDSC: HCPCS

## 2022-01-01 PROCEDURE — 82565 ASSAY OF CREATININE: CPT

## 2022-01-01 PROCEDURE — 250N000011 HC RX IP 250 OP 636: Performed by: INTERNAL MEDICINE

## 2022-01-01 PROCEDURE — 81001 URINALYSIS AUTO W/SCOPE: CPT | Performed by: PHYSICIAN ASSISTANT

## 2022-01-01 PROCEDURE — U0005 INFEC AGEN DETEC AMPLI PROBE: HCPCS | Performed by: PHYSICIAN ASSISTANT

## 2022-01-01 PROCEDURE — 999N000157 HC STATISTIC RCP TIME EA 10 MIN

## 2022-01-01 PROCEDURE — 82607 VITAMIN B-12: CPT | Performed by: INTERNAL MEDICINE

## 2022-01-01 PROCEDURE — 84145 PROCALCITONIN (PCT): CPT | Performed by: INTERNAL MEDICINE

## 2022-01-01 PROCEDURE — 96360 HYDRATION IV INFUSION INIT: CPT

## 2022-01-01 PROCEDURE — 74177 CT ABD & PELVIS W/CONTRAST: CPT | Mod: MG

## 2022-01-01 PROCEDURE — 99223 1ST HOSP IP/OBS HIGH 75: CPT | Performed by: INTERNAL MEDICINE

## 2022-01-01 PROCEDURE — 258N000003 HC RX IP 258 OP 636: Performed by: INTERNAL MEDICINE

## 2022-01-01 PROCEDURE — 82805 BLOOD GASES W/O2 SATURATION: CPT | Performed by: PHYSICIAN ASSISTANT

## 2022-01-01 PROCEDURE — 84484 ASSAY OF TROPONIN QUANT: CPT | Performed by: PHYSICIAN ASSISTANT

## 2022-01-01 PROCEDURE — A9585 GADOBUTROL INJECTION: HCPCS | Performed by: RADIOLOGY

## 2022-01-01 PROCEDURE — 82550 ASSAY OF CK (CPK): CPT | Performed by: INTERNAL MEDICINE

## 2022-01-01 PROCEDURE — 84439 ASSAY OF FREE THYROXINE: CPT | Performed by: PHYSICIAN ASSISTANT

## 2022-01-01 PROCEDURE — 83605 ASSAY OF LACTIC ACID: CPT | Performed by: INTERNAL MEDICINE

## 2022-01-01 PROCEDURE — 87040 BLOOD CULTURE FOR BACTERIA: CPT | Performed by: PHYSICIAN ASSISTANT

## 2022-01-01 PROCEDURE — 83880 ASSAY OF NATRIURETIC PEPTIDE: CPT | Performed by: INTERNAL MEDICINE

## 2022-01-01 PROCEDURE — 99291 CRITICAL CARE FIRST HOUR: CPT | Performed by: PHYSICIAN ASSISTANT

## 2022-01-01 PROCEDURE — 74178 CT ABD&PLV WO CNTR FLWD CNTR: CPT

## 2022-01-01 PROCEDURE — 36415 COLL VENOUS BLD VENIPUNCTURE: CPT | Performed by: INTERNAL MEDICINE

## 2022-01-01 PROCEDURE — 82746 ASSAY OF FOLIC ACID SERUM: CPT | Performed by: INTERNAL MEDICINE

## 2022-01-01 PROCEDURE — 84425 ASSAY OF VITAMIN B-1: CPT | Performed by: INTERNAL MEDICINE

## 2022-01-01 PROCEDURE — 255N000002 HC RX 255 OP 636: Performed by: RADIOLOGY

## 2022-01-01 PROCEDURE — 82140 ASSAY OF AMMONIA: CPT | Performed by: INTERNAL MEDICINE

## 2022-01-01 PROCEDURE — 99238 HOSP IP/OBS DSCHRG MGMT 30/<: CPT | Performed by: INTERNAL MEDICINE

## 2022-01-01 PROCEDURE — 87086 URINE CULTURE/COLONY COUNT: CPT | Performed by: PHYSICIAN ASSISTANT

## 2022-01-01 PROCEDURE — 82040 ASSAY OF SERUM ALBUMIN: CPT | Performed by: INTERNAL MEDICINE

## 2022-01-01 PROCEDURE — 82306 VITAMIN D 25 HYDROXY: CPT | Performed by: INTERNAL MEDICINE

## 2022-01-01 PROCEDURE — 83550 IRON BINDING TEST: CPT | Performed by: INTERNAL MEDICINE

## 2022-01-01 PROCEDURE — 85025 COMPLETE CBC W/AUTO DIFF WBC: CPT | Performed by: INTERNAL MEDICINE

## 2022-01-01 PROCEDURE — 36415 COLL VENOUS BLD VENIPUNCTURE: CPT | Performed by: PHYSICIAN ASSISTANT

## 2022-01-01 PROCEDURE — 250N000009 HC RX 250: Performed by: INTERNAL MEDICINE

## 2022-01-01 PROCEDURE — 93010 ELECTROCARDIOGRAM REPORT: CPT | Performed by: INTERNAL MEDICINE

## 2022-01-01 PROCEDURE — C9803 HOPD COVID-19 SPEC COLLECT: HCPCS

## 2022-01-01 PROCEDURE — 120N000001 HC R&B MED SURG/OB

## 2022-01-01 PROCEDURE — 82040 ASSAY OF SERUM ALBUMIN: CPT | Performed by: PHYSICIAN ASSISTANT

## 2022-01-01 PROCEDURE — 258N000001 HC RX 258: Performed by: INTERNAL MEDICINE

## 2022-01-01 PROCEDURE — 82533 TOTAL CORTISOL: CPT | Performed by: INTERNAL MEDICINE

## 2022-01-01 PROCEDURE — 70553 MRI BRAIN STEM W/O & W/DYE: CPT | Mod: ME

## 2022-01-01 PROCEDURE — 99233 SBSQ HOSP IP/OBS HIGH 50: CPT | Performed by: INTERNAL MEDICINE

## 2022-01-01 PROCEDURE — 99285 EMERGENCY DEPT VISIT HI MDM: CPT | Mod: 25

## 2022-01-01 PROCEDURE — 80053 COMPREHEN METABOLIC PANEL: CPT | Performed by: INTERNAL MEDICINE

## 2022-01-01 PROCEDURE — 83605 ASSAY OF LACTIC ACID: CPT | Performed by: PHYSICIAN ASSISTANT

## 2022-01-01 PROCEDURE — 86140 C-REACTIVE PROTEIN: CPT | Performed by: PHYSICIAN ASSISTANT

## 2022-01-01 PROCEDURE — 93005 ELECTROCARDIOGRAM TRACING: CPT

## 2022-01-01 PROCEDURE — 71045 X-RAY EXAM CHEST 1 VIEW: CPT

## 2022-01-01 PROCEDURE — 97166 OT EVAL MOD COMPLEX 45 MIN: CPT | Mod: GO

## 2022-01-01 PROCEDURE — 92610 EVALUATE SWALLOWING FUNCTION: CPT | Mod: GN

## 2022-01-01 PROCEDURE — 84134 ASSAY OF PREALBUMIN: CPT | Performed by: INTERNAL MEDICINE

## 2022-01-01 RX ORDER — ACETAMINOPHEN 325 MG/1
325 TABLET ORAL EVERY 4 HOURS PRN
COMMUNITY

## 2022-01-01 RX ORDER — MINERAL OIL/HYDROPHIL PETROLAT
OINTMENT (GRAM) TOPICAL AT BEDTIME
COMMUNITY

## 2022-01-01 RX ORDER — CALCIUM CARBONATE 500 MG/1
1000 TABLET, CHEWABLE ORAL 4 TIMES DAILY PRN
Status: DISCONTINUED | OUTPATIENT
Start: 2022-01-01 | End: 2022-01-01 | Stop reason: HOSPADM

## 2022-01-01 RX ORDER — PETROLATUM,WHITE
OINTMENT IN PACKET (GRAM) TOPICAL DAILY PRN
Status: ON HOLD | COMMUNITY
End: 2022-01-01

## 2022-01-01 RX ORDER — ONDANSETRON 2 MG/ML
4 INJECTION INTRAMUSCULAR; INTRAVENOUS EVERY 6 HOURS PRN
Status: DISCONTINUED | OUTPATIENT
Start: 2022-01-01 | End: 2022-01-01 | Stop reason: HOSPADM

## 2022-01-01 RX ORDER — CLOPIDOGREL BISULFATE 75 MG/1
75 TABLET ORAL AT BEDTIME
Status: DISCONTINUED | OUTPATIENT
Start: 2022-01-01 | End: 2022-01-01 | Stop reason: HOSPADM

## 2022-01-01 RX ORDER — MORPHINE SULFATE 20 MG/ML
5-10 SOLUTION ORAL
Status: DISCONTINUED | OUTPATIENT
Start: 2022-01-01 | End: 2022-01-01 | Stop reason: HOSPADM

## 2022-01-01 RX ORDER — TAMSULOSIN HYDROCHLORIDE 0.4 MG/1
0.8 CAPSULE ORAL DAILY
Status: DISCONTINUED | OUTPATIENT
Start: 2022-01-01 | End: 2022-01-01

## 2022-01-01 RX ORDER — NALOXONE HYDROCHLORIDE 0.4 MG/ML
0.2 INJECTION, SOLUTION INTRAMUSCULAR; INTRAVENOUS; SUBCUTANEOUS
Status: DISCONTINUED | OUTPATIENT
Start: 2022-01-01 | End: 2022-01-01 | Stop reason: HOSPADM

## 2022-01-01 RX ORDER — IOPAMIDOL 755 MG/ML
77 INJECTION, SOLUTION INTRAVASCULAR ONCE
Status: COMPLETED | OUTPATIENT
Start: 2022-01-01 | End: 2022-01-01

## 2022-01-01 RX ORDER — DORZOLAMIDE HCL 20 MG/ML
1 SOLUTION/ DROPS OPHTHALMIC 2 TIMES DAILY
Status: DISCONTINUED | OUTPATIENT
Start: 2022-01-01 | End: 2022-01-01

## 2022-01-01 RX ORDER — FINASTERIDE 5 MG/1
5 TABLET, FILM COATED ORAL DAILY
Status: DISCONTINUED | OUTPATIENT
Start: 2022-01-01 | End: 2022-01-01

## 2022-01-01 RX ORDER — NALOXONE HYDROCHLORIDE 0.4 MG/ML
0.4 INJECTION, SOLUTION INTRAMUSCULAR; INTRAVENOUS; SUBCUTANEOUS
Status: DISCONTINUED | OUTPATIENT
Start: 2022-01-01 | End: 2022-01-01 | Stop reason: HOSPADM

## 2022-01-01 RX ORDER — TAMSULOSIN HYDROCHLORIDE 0.4 MG/1
0.8 CAPSULE ORAL AT BEDTIME
Status: DISCONTINUED | OUTPATIENT
Start: 2022-01-01 | End: 2022-01-01 | Stop reason: HOSPADM

## 2022-01-01 RX ORDER — GADOBUTROL 604.72 MG/ML
7.5 INJECTION INTRAVENOUS ONCE
Status: COMPLETED | OUTPATIENT
Start: 2022-01-01 | End: 2022-01-01

## 2022-01-01 RX ORDER — IPRATROPIUM BROMIDE 21 UG/1
2 SPRAY, METERED NASAL EVERY MORNING
COMMUNITY

## 2022-01-01 RX ORDER — MIRTAZAPINE 7.5 MG/1
7.5 TABLET, FILM COATED ORAL AT BEDTIME
Status: DISCONTINUED | OUTPATIENT
Start: 2022-01-01 | End: 2022-01-01 | Stop reason: HOSPADM

## 2022-01-01 RX ORDER — IOPAMIDOL 755 MG/ML
50 INJECTION, SOLUTION INTRAVASCULAR ONCE
Status: COMPLETED | OUTPATIENT
Start: 2022-01-01 | End: 2022-01-01

## 2022-01-01 RX ORDER — DEXTROSE MONOHYDRATE 25 G/50ML
25 INJECTION, SOLUTION INTRAVENOUS ONCE
Status: COMPLETED | OUTPATIENT
Start: 2022-01-01 | End: 2022-01-01

## 2022-01-01 RX ORDER — IPRATROPIUM BROMIDE AND ALBUTEROL SULFATE 2.5; .5 MG/3ML; MG/3ML
3 SOLUTION RESPIRATORY (INHALATION) 2 TIMES DAILY
Status: DISCONTINUED | OUTPATIENT
Start: 2022-01-01 | End: 2022-01-01

## 2022-01-01 RX ORDER — ASPIRIN 81 MG/1
81 TABLET, CHEWABLE ORAL DAILY
Status: DISCONTINUED | OUTPATIENT
Start: 2022-01-01 | End: 2022-01-01

## 2022-01-01 RX ORDER — IOPAMIDOL 755 MG/ML
97 INJECTION, SOLUTION INTRAVASCULAR ONCE
Status: COMPLETED | OUTPATIENT
Start: 2022-01-01 | End: 2022-01-01

## 2022-01-01 RX ORDER — ACETYLCYSTEINE 200 MG/ML
2 SOLUTION ORAL; RESPIRATORY (INHALATION)
Status: DISCONTINUED | OUTPATIENT
Start: 2022-01-01 | End: 2022-01-01 | Stop reason: HOSPADM

## 2022-01-01 RX ORDER — ONDANSETRON 4 MG/1
4 TABLET, ORALLY DISINTEGRATING ORAL EVERY 6 HOURS PRN
Status: DISCONTINUED | OUTPATIENT
Start: 2022-01-01 | End: 2022-01-01 | Stop reason: HOSPADM

## 2022-01-01 RX ORDER — GUAIFENESIN 600 MG/1
600 TABLET, EXTENDED RELEASE ORAL 2 TIMES DAILY
Status: DISCONTINUED | OUTPATIENT
Start: 2022-01-01 | End: 2022-01-01

## 2022-01-01 RX ORDER — CLOPIDOGREL BISULFATE 75 MG/1
75 TABLET ORAL DAILY
Status: DISCONTINUED | OUTPATIENT
Start: 2022-01-01 | End: 2022-01-01

## 2022-01-01 RX ORDER — ACETAMINOPHEN 500 MG
500 TABLET ORAL AT BEDTIME
COMMUNITY

## 2022-01-01 RX ORDER — FINASTERIDE 5 MG/1
5 TABLET, FILM COATED ORAL AT BEDTIME
Status: DISCONTINUED | OUTPATIENT
Start: 2022-01-01 | End: 2022-01-01 | Stop reason: HOSPADM

## 2022-01-01 RX ORDER — LIDOCAINE 40 MG/G
CREAM TOPICAL
Status: DISCONTINUED | OUTPATIENT
Start: 2022-01-01 | End: 2022-01-01 | Stop reason: HOSPADM

## 2022-01-01 RX ORDER — LANOLIN ALCOHOL/MO/W.PET/CERES
3 CREAM (GRAM) TOPICAL
Status: DISCONTINUED | OUTPATIENT
Start: 2022-01-01 | End: 2022-01-01 | Stop reason: HOSPADM

## 2022-01-01 RX ORDER — FAMOTIDINE 40 MG/1
40 TABLET, FILM COATED ORAL AT BEDTIME
COMMUNITY
Start: 2022-01-01

## 2022-01-01 RX ORDER — LORAZEPAM 2 MG/ML
1-2 INJECTION INTRAMUSCULAR EVERY 4 HOURS PRN
Status: DISCONTINUED | OUTPATIENT
Start: 2022-01-01 | End: 2022-01-01 | Stop reason: HOSPADM

## 2022-01-01 RX ORDER — IPRATROPIUM BROMIDE AND ALBUTEROL SULFATE 2.5; .5 MG/3ML; MG/3ML
3 SOLUTION RESPIRATORY (INHALATION)
Status: DISCONTINUED | OUTPATIENT
Start: 2022-01-01 | End: 2022-01-01 | Stop reason: HOSPADM

## 2022-01-01 RX ORDER — LATANOPROST 50 UG/ML
1 SOLUTION/ DROPS OPHTHALMIC DAILY
Status: DISCONTINUED | OUTPATIENT
Start: 2022-01-01 | End: 2022-01-01

## 2022-01-01 RX ORDER — ASPIRIN 81 MG/1
81 TABLET, CHEWABLE ORAL AT BEDTIME
Status: DISCONTINUED | OUTPATIENT
Start: 2022-01-01 | End: 2022-01-01 | Stop reason: HOSPADM

## 2022-01-01 RX ORDER — GUAIFENESIN 600 MG/1
600 TABLET, EXTENDED RELEASE ORAL EVERY 12 HOURS PRN
Status: DISCONTINUED | OUTPATIENT
Start: 2022-01-01 | End: 2022-01-01 | Stop reason: HOSPADM

## 2022-01-01 RX ADMIN — LORAZEPAM 1 MG: 2 INJECTION INTRAMUSCULAR; INTRAVENOUS at 22:33

## 2022-01-01 RX ADMIN — MIRTAZAPINE 7.5 MG: 7.5 TABLET, FILM COATED ORAL at 22:37

## 2022-01-01 RX ADMIN — AMPICILLIN SODIUM AND SULBACTAM SODIUM 3 G: 2; 1 INJECTION, POWDER, FOR SOLUTION INTRAMUSCULAR; INTRAVENOUS at 04:56

## 2022-01-01 RX ADMIN — AMPICILLIN SODIUM AND SULBACTAM SODIUM 3 G: 2; 1 INJECTION, POWDER, FOR SOLUTION INTRAMUSCULAR; INTRAVENOUS at 11:27

## 2022-01-01 RX ADMIN — AMPICILLIN SODIUM AND SULBACTAM SODIUM 3 G: 2; 1 INJECTION, POWDER, FOR SOLUTION INTRAMUSCULAR; INTRAVENOUS at 22:49

## 2022-01-01 RX ADMIN — IPRATROPIUM BROMIDE AND ALBUTEROL SULFATE 3 ML: .5; 3 SOLUTION RESPIRATORY (INHALATION) at 20:43

## 2022-01-01 RX ADMIN — SODIUM CHLORIDE 1000 ML: 9 INJECTION, SOLUTION INTRAVENOUS at 12:57

## 2022-01-01 RX ADMIN — MORPHINE SULFATE 5 MG: 10 SOLUTION ORAL at 03:52

## 2022-01-01 RX ADMIN — SODIUM CHLORIDE 1000 ML: 9 INJECTION, SOLUTION INTRAVENOUS at 13:02

## 2022-01-01 RX ADMIN — GADOBUTROL 7.5 ML: 604.72 INJECTION INTRAVENOUS at 10:58

## 2022-01-01 RX ADMIN — IPRATROPIUM BROMIDE AND ALBUTEROL SULFATE 3 ML: .5; 3 SOLUTION RESPIRATORY (INHALATION) at 08:16

## 2022-01-01 RX ADMIN — IOPAMIDOL 50 ML: 755 INJECTION, SOLUTION INTRAVENOUS at 15:05

## 2022-01-01 RX ADMIN — MORPHINE SULFATE 5 MG: 10 SOLUTION ORAL at 21:14

## 2022-01-01 RX ADMIN — IOPAMIDOL 97 ML: 755 INJECTION, SOLUTION INTRAVENOUS at 13:07

## 2022-01-01 RX ADMIN — DEXTROSE MONOHYDRATE 25 ML: 25 INJECTION, SOLUTION INTRAVENOUS at 07:40

## 2022-01-01 RX ADMIN — DORZOLAMIDE HYDROCHLORIDE 1 DROP: 20 SOLUTION/ DROPS OPHTHALMIC at 22:38

## 2022-01-01 RX ADMIN — FINASTERIDE 5 MG: 5 TABLET, FILM COATED ORAL at 22:37

## 2022-01-01 RX ADMIN — IOPAMIDOL 77 ML: 755 INJECTION, SOLUTION INTRAVENOUS at 14:12

## 2022-01-01 ASSESSMENT — ACTIVITIES OF DAILY LIVING (ADL)
ADLS_ACUITY_SCORE: 59
DRESSING/BATHING_DIFFICULTY: YES
VISION_MANAGEMENT: GLASSES
ADLS_ACUITY_SCORE: 59
TOILETING_ISSUES: YES
WALKING_OR_CLIMBING_STAIRS: AMBULATION DIFFICULTY, REQUIRES EQUIPMENT;AMBULATION DIFFICULTY, ASSISTANCE 1 PERSON;TRANSFERRING DIFFICULTY, REQUIRES EQUIPMENT;TRANSFERRING DIFFICULTY, ASSISTANCE 1 PERSON
IADLS,_PREVIOUS_FUNCTIONAL_LEVEL: NOT APPLICABLE (SEE COMMENT)
BADLS,_PREVIOUS_FUNCTIONAL_LEVEL: OTHER (SEE COMMENTS)
CONCENTRATING,_REMEMBERING_OR_MAKING_DECISIONS_DIFFICULTY: YES
ADLS_ACUITY_SCORE: 39
ADLS_ACUITY_SCORE: 39
ADLS_ACUITY_SCORE: 57
ADLS_ACUITY_SCORE: 49
ADLS_ACUITY_SCORE: 49
FALL_HISTORY_WITHIN_LAST_SIX_MONTHS: NO
CHANGE_IN_FUNCTIONAL_STATUS_SINCE_ONSET_OF_CURRENT_ILLNESS/INJURY: YES
EQUIPMENT_CURRENTLY_USED_AT_HOME: WALKER, STANDARD
ADLS_ACUITY_SCORE: 35
ADLS_ACUITY_SCORE: 57
ADLS_ACUITY_SCORE: 57
ADLS_ACUITY_SCORE: 59
ADLS_ACUITY_SCORE: 59
DIFFICULTY_EATING/SWALLOWING: NO
DOING_ERRANDS_INDEPENDENTLY_DIFFICULTY: OTHER (SEE COMMENTS)
ADLS_ACUITY_SCORE: 57
ADLS_ACUITY_SCORE: 59
TOILETING_ASSISTANCE: TOILETING DIFFICULTY, DEPENDENT
DRESSING/BATHING: BATHING DIFFICULTY, DEPENDENT;DRESSING DIFFICULTY, ASSISTANCE 1 PERSON
ADLS_ACUITY_SCORE: 57
ADLS_ACUITY_SCORE: 59
WEAR_GLASSES_OR_BLIND: YES
ADLS_ACUITY_SCORE: 59
WALKING_OR_CLIMBING_STAIRS_DIFFICULTY: YES
ADLS_ACUITY_SCORE: 59

## 2022-05-16 PROBLEM — R41.82 ALTERED MENTAL STATUS: Status: ACTIVE | Noted: 2022-01-01

## 2022-05-16 PROBLEM — I95.9 HYPOTENSION, UNSPECIFIED HYPOTENSION TYPE: Status: ACTIVE | Noted: 2022-01-01

## 2022-05-16 PROBLEM — T68.XXXA HYPOTHERMIA, INITIAL ENCOUNTER: Status: ACTIVE | Noted: 2022-01-01

## 2022-05-16 NOTE — ED NOTES
Patient rectal temp 90.6, recheck 89.1 rectal.  Patient also hypotensive. Bear Hugger placed, warm fluids hung. Provider aware.

## 2022-05-16 NOTE — ED NOTES
Daughter Marcella at bedside. Marcella reports patient has Martínez's so pulse oximeters don't always work with him. Also reports he has a history of blood pressure checks being much lower on one arm. Blood pressure cuff moved to right arm and blood pressure is 112/60.

## 2022-05-16 NOTE — ED NOTES
"DTR reports that [place] to nursing home d/t anxiety and incontinence and that he has progressively gotten weaker and more confused over the weekend. Patient recently on antibiotics about 2 weeks ago for UTI. DTR reports that patient has been seeing things that aren't there- \"floaters\" and is very disoriented but could be attributed to recent change/move. DTR reports that patient has been eating and drinking adequately.     Patient now asking how he got here and where he came from.   "

## 2022-05-16 NOTE — DISCHARGE INSTRUCTIONS
What to expect when you have contrast    During your exam, we will inject  contrast  into your vein or artery. (Contrast is a clear liquid with iodine in it. It shows up on X-rays.)    You may feel warm or hot. You may have a metal taste in your mouth and a slight upset stomach. You may also feel pressure near the kidneys and bladder. These effects will last about 1 to 3 minutes.    Please tell us if you have:   Sneezing    Itching   Hives    Swelling in the face   A hoarse voice   Breathing problems   Other new symptoms    Serious problems are rare.  They may include:   Irregular heartbeat    Seizures   Kidney failure             Tissue damage   Shock     Death    If you have any problems during the exam, we  will treat them right away.    When you get home    Call your hospital if you have any new symptoms in the next 2 days, like hives or swelling. (Phone numbers are at the bottom of this page.) Or call your family doctor.     If you have wheezing or trouble breathing, call 911.    Self-care  -Drink at least 4 extra glasses of water today.   This reduces the stress on your kidneys.  -Keep taking your regular medicines.    The contrast will pass out of your body in your  Urine(pee). This will happen in the next 24 hours. You  will not feel this. Your urine will not  change color.    If you have kidney problems or take metformin    Drink 4 to 8 large glasses of water for the next  2 days, if you are not on a fluid restriction.    ?If you take metformin (Glucophage or Glucovance) for diabetes, keep taking it.      ?Your kidney function tests are abnormal.  If you take Metformin, do not take it for 48 hours. Please go to your clinic for a blood test within 3 days after your exam before the restarting this medicine.     (Note to provider:please give patient prescription for lab tests.)    ?Special instructions: **    I have read and understand the above information.    Patient Sign  Here:______________________________________Date:________Time:______    Staff Sign Here:________________________________________Date:_______Time:______      Radiology Departments:     ?[facility]: [phone] ?[place]: [phone]     ?[place]: [phone] ?[place]:[phone]      ?[place]: [phone]  ?[place]: [phone]  ?[place]:[phone]    ?[facility] [place]:[phone]  ?[facility] [place]:[phone]

## 2022-05-16 NOTE — H&P
[facility]    History and Physical - Hospitalist Service       Date of Admission:  5/16/2022    Assessment & Plan      Antonio Shoemaker is a 82 year old male admitted on 5/16/2022.      Hypothermia: Lata hugger warmer. The etiology is not clear, but dementia can be a cause. Will stop Lisinopril, lower Metoprolol dosing from 50 mg XL to 12.5 mg XL daily, dose-reduce Remeron from 30 mg to 7.5 mg.    Chronic cough, with gradual worsening: will order dysphagia diet with thickened liquids, consult Speech to evaluate and treat.    Sudden worsening of balance, walking ability and cognitive status: MRI of head. Stop statin therapy, check CK, vitamin D, B12, Thiamine, Folate    Slight hypercapnia: check BNP, RT treatments, oxygen as needed    Low albumin, in decline since 2020: check prealbumin    Abnormal UA  Acute cystitis without hematuria.: small leukocyte esterase and few bacteria. This is not strong evidence for acute cystitis without hematuria, but he has a history of E faecalis and E Coli UTI's which affect his abilities, so  dose a short course of Unasyn (after review of the two positive culture results) and monitor current UA culture    Anemia, macrocytic: check iron stores    General: check procalcitonin, lactic acid, ammonia, post void bladder scan    Dscussed the overall situation with the daughter. She and her siblings are aware that this decline might be a natural one, marking end-of-life. This patient might be a candidate for Hospice Care     Diet: dysphagia  DVT Prophylaxis: Pneumatic Compression Devices  Boudreaux Catheter: Not present  Central Lines: None  Cardiac Monitoring: None  Code Status:   DNR          Disposition Plan   Expected Discharge: > 2 days   Anticipated discharge location:  Awaiting care coordination huddle        The patient's care was discussed with the Patient's daughter    Larry Zamora,   Hospitalist Service  [facility]  Securely message with the Vocera Web  Console (learn more here)  Text page via [facility] Paging/Directory         ______________________________________________________________________    Chief Complaint   Long-term decline in cognition, balance and general abilities, with acute decline in the last week    History is obtained from the patient's daughter, ER Provider, EHR review      History of Present Illness   Antonio Shoemaker is a 82 year old male who is a retired . He has a long heavy drinking and smoking history, but has quit both in the last 6-8 years. He has PAD, carotid disease and CAD, suffering an MI in 2018; it was the MI that seems to have marked the start of the long-term decline.    He became less safe with ambulation and was admitted into the [facility] assisted living [place]. Since admission, he has become more anxious about walking, about urination; he complained more about tinnitus, which was worked up without a determinate etiology.    In the last week, all of these elements of decline and worry escalated, prompting a move to the SNF section of the [facility]. This move has increased his disorientation and increased his anxiety. He was sent to the ER for evaluation because of continued disorientation and decline.     ER Course: vitals were notable for the hypothermia, initially 91F. The patient was in no distress but was distracted and lethargic. Lab diagnostics included a heme profile showing a new macrocytic anemia in the last year. The chemistry panel showed preserved stage 2 renal insufficiency and worsening albumin level over tme. The UA showed small leukocyte esterase and some bacteria with normal WBC and thyroid studies were unremarkable. Imaging of the chest, abdomen and pelvis showed no acute process and the head CT again noted small vessel disease changes. He was given IV fluid and warmed with a Lata Hugger.    Review of Systems  provided by the daughter, present. The patient was distracted and  unreliable    Constitutional: No fever or chills, worsening generalized weakness,  Diminished appetite  Ears, Nose & Throat: no sore throat, no nasal drainage, no congestion. No ear pain, no ear drainage, no particular change in hearing  Eyes: no particular change in vision, no redness, no drainage  Cardiovascular: No chest pain at rest, no chest pain with familiar activities.  Pulmonary: chronic, wet, upper cough, no particular change in work of breathing, no particular change in shortness of breath with position changes or familiar activities  Gastrointestinal: No abdominal pain, no nausea, no vomiting, no diarrhea. No particular change in bowel movement pattern, no black stools, no bloody stools  Genitourinary: some incontinence, and increasing anxiety over urination and wetting himself  Skin: No particular change in bruising, no rashes  Musculoskeletal: no particular change in strength, no particular change in muscle development  Neurological: no numbness and tingling, no headache, worsening balance, increasing dizziness  Psychologic: increasing anxiety over walking and fear of falling and of urination; he also was acutely disoriented with the recent move to the SNF section from assisted living  Endocrine: No particular complaints of being hot or cold        Past Medical History    I have reviewed this patient's medical history and updated it with pertinent information if needed.   Cerebral small vessel disease  PAD  CAD  Past Surgical History   I have reviewed this patient's surgical history and updated it with pertinent information if needed.  Past Surgical History:   Procedure Laterality Date     COLONOSCOPY  8/12/2013    Procedure: COLONOSCOPY;   Upper Endoscopy w/ biopsies and COLONOSCOPY;  Surgeon: Sheri Joseph DO;  Location: [facility] OR     NASAL SCOPE,BX/RMV POLYP/DEBRID       ORTHOPEDIC SURGERY      knee     VASCULAR SURGERY      carotid       Social History   I have reviewed this patient's social  history and updated it with pertinent information if needed.  Social History     Tobacco Use     Smoking status: Former Smoker     Smokeless tobacco: Never Used   Substance Use Topics     Alcohol use: No     Drug use: No   note: the daughter claims he has a long past history of heavy ETOH use    Family History   Not related to this admit    Prior to Admission Medications   Prior to Admission Medications   Prescriptions Last Dose Informant Patient Reported? Taking?   Clopidogrel Bisulfate (PLAVIX PO)   Yes No   Sig: Take 75 mg by mouth daily   Garlic 100 MG TABS   Yes No   Sig: Take 1,000 mg by mouth daily   Turmeric 500 MG TABS   Yes No   Sig: Take 1 tablet by mouth daily   aspirin 81 MG EC tablet   Yes No   Sig: Take 81 mg by mouth daily   atorvastatin (LIPITOR) 40 MG tablet   Yes No   Sig: Take 40 mg by mouth daily   bimatoprost (LUMIGAN) 0.01 % SOLN   Yes No   Sig: Place 1 drop Into the left eye every morning   dorzolamide (TRUSOPT) 2 % ophthalmic solution   Yes No   Sig: Place 1 drop Into the left eye 2 times daily   finasteride (PROSCAR) 5 MG tablet   Yes No   Sig: Take 5 mg by mouth daily   furosemide (LASIX) 20 MG tablet   Yes No   Sig: Take 20 mg by mouth daily   latanoprost (XALATAN) 0.005 % ophthalmic solution   Yes No   Si drop daily   lisinopril (PRINIVIL/ZESTRIL) 5 MG tablet   Yes No   Sig: Take 5 mg by mouth daily   melatonin 3 MG CAPS   Yes No   metoprolol succinate ER (TOPROL-XL) 50 MG 24 hr tablet   Yes No   Sig: Take 50 mg by mouth daily   mirtazapine (REMERON) 30 MG tablet   Yes No   Sig: Take 30 mg by mouth At Bedtime   nitroFURantoin macrocrystal-monohydrate (MACROBID) 100 MG capsule   No No   Sig: Take 1 capsule (100 mg) by mouth 2 times daily   tamsulosin (FLOMAX) 0.4 MG capsule   Yes No   Sig: Take 0.8 mg by mouth daily      Facility-Administered Medications: None     Allergies   No Known Allergies    Physical Exam   Vital Signs: Temp: (!) 95.5  F (35.3  C) Temp src: Tympanic BP: (!)  "134/113 Pulse: 64   Resp: 14 SpO2: 98 % O2 Device: None (Room air)    Weight: 0 lbs 0 oz    Case reviewed with the ER Provider, EHR reviewed; patient seen in ER room 11, with daughter present    Vital signs:  Temp: (!) 95.3  F (35.2  C) Temp src: Tympanic BP: 140/79 Pulse: 70   Resp: 16 SpO2: (!) 90 % O2 Device: None (Room air)        Estimated body mass index is 29.12 kg/m  as calculated from the following:    Height as of 2/18/21: 1.651 m (5' 5\").    Weight as of 7/14/21: 79.4 kg (175 lb).      General: No distress, interactive, distracted, not aware of location, year, current circumstances  Head: normocephalic, no obvious trauma  Eyes: Gaze directed normally, sclera clear, no discharge, no abnormal ocular movements  Ears: Normal appearing age-related external ears, no discharge, stable hearing acuity loss  Nose: Normal age-related appearance  Mouth: Normal appearing oral mucosa, Gums and throat appear age-related normal  Neck: Normal age-related appearance, age-related range of motion, supple, no adenopathy  Pulmonary: Normal work of breathing, poor effort, some expiratory delay, no coarseness, no wheezing  Cardiovascular: Distant heart sounds, regular rhythm   Abdomen: No obvious distention, soft, bowel sounds present with normal frequency and pitch  Rectal: Deferred  Back: Age-related normal  Skin: Age-related normal, no rashes  Extremities: Not tender, bilateral lower extremity edema. Moving upper and lower extremities  Neurological: Grossly in tact, motor.   Psychiatric: Mood is stable        Data   Data reviewed today: I reviewed all medications, new labs and imaging results over the last 24 hours.      "

## 2022-05-17 NOTE — PHARMACY-MEDICATION REGIMEN REVIEW
Pharmacy Antimicrobial Stewardship Assessment     Current Antimicrobial Therapy:  Anti-infectives (From now, onward)    None       ~Unasyn x2 doses given  & ~    Indication: UTI    Days of Therapy: no current orders     Pertinent Labs:  Recent Labs   Lab Test 22  0543 22  1240 21  1352   WBC 6.1 7.0 7.1     Recent Labs   Lab Test 22  1854 22  1737 22  1240 20  1430 19   LACT 0.8  --  1.5  --  1.4   CRP  --   --  18.7*  --  10.2*   SED  --   --   --  16  --    PCAL  --  <0.05  --   --   --         Temperature:  Temp (24hrs), Av.4  F (35.2  C), Min:90.6  F (32.6  C), Max:98  F (36.7  C)    Culture Results:   (22) COVID = negative  (22) Blood  (22) Urine = >100k GPC    Recommendations/Interventions:  1. Unasyn ordered to stop after 3 doses so no further orders; please restart    Joe Hoffmann, [facility]  May 17, 2022

## 2022-05-17 NOTE — PROGRESS NOTES
05/17/22 1400   Quick Adds   Type of Visit Initial Occupational Therapy Evaluation   Living Environment   Current Living Arrangements extended care facility   Home Accessibility no concerns   Transportation Anticipated family or friend will provide;health plan transportation   Living Environment Comments Pt was living in [facility] until last Thursday when he moved to the SNF side at [facility]   Self-Care   Usual Activity Tolerance fair   Current Activity Tolerance poor   Regular Exercise Yes   Activity/Exercise Type walking   Exercise Amount/Frequency 3-5 times/wk  (Pt's daughter stated he would walk every day around facility prior)   Equipment Currently Used at Home walker, standard   Fall history within last six months no  (pt's daughter didn't think he had fallen but he may have once)   Post-Acute Assessment Only   Post-Acute Functional Assessment See IP Rehab Daily Documentation Flowsheet for Functional Mobility/ADL Assessment   Previous Level of Function/Home Environm   Bathing/Grooming, Premorbid Functional Level uses device or equipment;partial assistance   Dressing, Premorbid Functional Level partial assistance   Eating/Feeding, Premorbid Functional Level independent   Toileting, Premorbid Functional Level partial assistance   BADLs, Premorbid Functional Level other (see comments)  (was using a walker until last wk then daughter asked that facility use a w/c as she was worried he might fall)   IADLs, Premorbid Functional Level not applicable (see comment)  (pt living in SNF)   Bed Mobility, Premorbid Functional Level partial assistance  (was able until last week)   Transfers, Premorbid Functional Level partial assistance   Household Ambulation, Premorbid Functional Level uses device or equipment   Previous Level of Function, Premorbid Pt was using walker and living in his assisted living apartment until last Thursday.   General Information   Onset of Illness/Injury or Date of Surgery 05/16/22    Referring Physician Dr Atkinson   Patient/Family Therapy Goal Statement (OT) family would like pt to be comfortable   Additional Occupational Profile Info/Pertinent History of Current Problem Pt presented to ER from SNF with hypothermia, chronic cough and dysphagia and altered mental status that progressed from last week when pt moved from [facility] to [facility]   Existing Precautions/Restrictions fall;swallowing   Limitations/Impairments safety/cognitive;swallowing   Left Upper Extremity (Weight-bearing Status) full weight-bearing (FWB)   Right Upper Extremity (Weight-bearing Status) full weight-bearing (FWB)   Left Lower Extremity (Weight-bearing Status) full weight-bearing (FWB)   Right Lower Extremity (Weight-bearing Status) full weight-bearing (FWB)   General Observations and Info Pt was awake in bed when OT approached. Pt was groggy but able to answer questions appropriately.   Cognitive Status Examination   Orientation Status person   Affect/Mental Status (Cognitive) low arousal/lethargic   Follows Commands follows one-step commands;delayed response/completion   Safety Deficit minimal deficit   Memory Deficit moderate deficit   Attention Deficit minimal deficit   Executive Function Deficit information processing;initiation;planning/decision-making;organization/sequencing;problem-solving/reasoning   Visual Perception   Visual Impairment/Limitations corrective lenses full-time   Sensory   Sensory Quick Adds No deficits were identified   Pain Assessment   Patient Currently in Pain No   Integumentary/Edema   Integumentary/Edema no deficits were identifed   Posture   Posture lordosis   Range of Motion Comprehensive   General Range of Motion bilateral upper extremity ROM WFL   Strength Comprehensive (MMT)   Comment, General Manual Muscle Testing (MMT) Assessment B UE grossly 3/5   Muscle Tone Assessment   Muscle Tone Quick Adds No deficits were identified   Coordination   Upper Extremity Coordination No deficits were identified    Bed Mobility   Assistive Device (Bed Mobility) bed rails   Activities of Daily Living   BADL Assessment/Intervention grooming;lower body dressing;upper body dressing   Upper Body Dressing Assessment/Training   Position (Upper Body Dressing) sitting up in bed   [place] Level (Upper Body Dressing) moderate assist (50% patient effort)   Lower Body Dressing Assessment/Training   [place] Level (Lower Body Dressing) dependent (less than 25% patient effort)   Grooming Assessment/Training   [place] Level (Grooming) minimum assist (75% patient effort)   Clinical Impression   Criteria for Skilled Therapeutic Interventions Met (OT) Yes, treatment indicated   OT Diagnosis decreased activity tolerance   Influenced by the following impairments endurance, cognition, strength   OT Problem List-Impairments impacting ADL strength;cognition   Assessment of Occupational Performance 3-5 Performance Deficits   Identified Performance Deficits dressing, grooming, mobility, cognition   Planned Therapy Interventions (OT) ADL retraining;cognition;progressive activity/exercise   Clinical Decision Making Complexity (OT) moderate complexity   Risk & Benefits of therapy have been explained patient;daughter;current/potential barriers reviewed   Clinical Impression Comments Pt was able to ambulate using walker and was managing living in [facility] until last week when he became more confused. Since moving into SNF in same building he has gradually declined. He would benefit from OT services   OT Discharge Planning   OT Discharge Recommendation (DC Rec) Long term care facility  (with short-term rehab to regain strength and function)   OT Rationale for DC Rec Pt was able to ambulate and complete BADLs until a week ago   OT Brief overview of current status Pt is dependent for care and has cognitive deficits. He requires a higher level of care than a week ago   Total Evaluation Time (Minutes)   Total Evaluation Time (Minutes) 25   OT Goals    Therapy Frequency (OT) 5 times/wk   OT Predicted Duration/Target Date for Goal Attainment 05/24/22   OT Goals Hygiene/Grooming;Upper Body Dressing;Toilet Transfer/Toileting;Cognition   OT: Hygiene/Grooming independent   OT: Upper Body Dressing Independent   OT: Toilet Transfer/Toileting Minimal assist   Psychosocial Support   Trust Relationship/Rapport emotional support provided;empathic listening provided

## 2022-05-17 NOTE — PROVIDER NOTIFICATION
Notified Dr. Atkinson of patient's lung sounds being coarse throughout, patient unable to clear his own secretions, very weak cough.

## 2022-05-17 NOTE — CARE PLAN
Prior to Admission Medication Reconciliation:     Medications added:   [] None  [x] As listed below:    atrovent    aquaphor    apap scheduled and PRN    Artificial tears    pepcid    Medications deleted:   [] None  [x] As listed below:    lumigan (ordered)    trusopt (ordered)    Lasix    Garlic    Latanoprost (ordered)    Lisinopril    Metoprolol (ordered)    macrobid    Medications marked for review/removal by attending:  [x] None  [] As listed below:    Changes made to existing medications:   [] None  [x] Updated strengths and frequencies to most current  [x] As listed below:    remeron from 30 mg to 45 mg    Pt takes most meds at HS    Last times/dates taken verified with patient:  [x] Yes- completed myself  [] Prepared PTA medlist for review only. (will not be available to review personally)  [] Did not review with patient. Rx verification only. Review completed by nursing.    [] Nurse completed no changes made (double checked entries)  [] Unable to review with patient at this time:  [] Nurse completed/changes made:     Allergies listed at another location:  [x]Allergies match allergies listed in Epic  []Other allergies listed:    Allergy review:    []Did not review: reviewed by nursing  []Did not review: pt unable at this time  [x]Patient/MAR verified NKDA  []Patient/MAR verified current existing allergies: no changes made  []Patient confirmed current existing allergies and new allergies added:    Medication reconciliation sources:   []Patient  []Patient family member/emergency contact: **  [x][facility] Report Review:  Home Medications     - Last Reconciled 05/12/22 by Tommy Dwyer MD    [x]acetaminophen 325 mg tablet 325 mg PO Q4H PRN MDD Max of 4000 mg daily   [x]acetaminophen 500 mg tablet 500 mg PO QHS   [x]artifi.tears(hypromellose)(PF) 0.3 % eye drops 1 drp Both Eyes Q4H PRN   [x]aspirin 81 mg tablet,delayed release 81 mg PO DAILY   [x]atorvastatin 40 mg tablet 40 mg PO BEDTIME   [x]clopidogrel 75 mg  rbixmi84 mg PO DAILY   [x] famotidine 40 mg tablet 40 mg PO DAILY   [x]finasteride 5 mg tablet 5 mg PO DAILY   [x]melatonin 3 mg tablet 9 mg (3 x 3 mg) PO BEDTIME PRN   [x]mirtazapine 30 mg tablet 45 mg PO DAILY   [x]tamsulosin 0.4 mg capsule 0.8 mg (2 x 0.4 mg) PO DAILY   [x]white petrolatum 41 % topical ointment 1 applic topical DAILY PRN   []Epic Chart Review  []Care Everywhere review  []Pharmacy med list: **  []Pharmacy phone call  [x]Outside meds dispense report: see below  [x]Nursing home or Assisted Living MAR:    Name Strength Instructions DATE TAKEN TIME TAKEN Notes   [] atrovent  0.03% 2 sprays both nostrils daily  5/16/22 AM    [x] atorvastatin 40 mg  At bedtime  5/15/22 HS    [x] Asa  81 mg At bedtime  5/15/22 HS    [x] Clopidogrel  75 mg  At bedtime  5/15/22 HS    [x] Famotidine  40 mg  At bedtime  5/15/22 HS    [x] finasteride  5 mg  At bedtime   5/15/22 HS    [x] melatonin 3 mg 9 mg at bedtime  5/15/22 HS    [x] remeron  45 mg  At bedtime  5/15/22 HS    [x] tamsulosin  0.4 mg  0.8 mg  At bedtime  5/15/22 HS    [x] Artificial tears  carboxymethylcellulose sodium 1% 1-2 gtt per package instructions PRN       [x] apap 325 mg 1 tabs q4h for pain rated 1-4 and 650 mg for pain rated 5-10      [x] aquaphor ointment  Apply to both nostrils once daily for epistaxis 5/15/22 HS    [x] apap 500 mg At bedtime  5/15/22 hs        []Other: **    Pharmacy desired at discharge: [facility]    Is patient on coumadin?  [x]No    Requests for consultation by provider or pharmacist:   [] Patient understands why all of their meds were prescribed and how to take them. No questions.   [] Managing party has no questions.   [] Patient/ managing party has questions about the following:  [x] Did not review with patient. Cannot assess.     Fill dates and reported compliancy:  [x] Fill dates coincide with reported compliancy for all/most maintenance meds.   [] Fill dates do not coincide with compliancy with maintenance meds. See notes  in PTA medlist and in comments.    [] Fill dates do not coincide with the following medications but pt reports compliancy:  [] Did not review with patient. Cannot assess.     Historian accuracy:  [] Excellent- alert and oriented, understands why meds were prescribed and how to take, able to answer specifics  [] Good- alert and oriented, understands why meds were prescribed and how to take, some confusion   [] Fair- alert and oriented, doesn't know medications without list, cannot answer specifics about medications, but has a decent process for which to take at home  [] Poor- does not know medications, may not have a process to take at home, may be cognitively unable to review at this time  [x]Medication management done by family member or facility, no concerns about historian accuracy.   [] Did not review with patient. Cannot assess.     Medication Management:  [] Manages meds independently  [] Family member/ other party manages meds/assists:  [x] Meds managed by staff at facility  [] Meds set up by home care, family/other party helps administer  [] Meds set up by home care, self administers  [] Did not review with patient. Cannot assess.     Other medications aside from PTA:  [x] Denies taking any medications aside from those listed in PTA meds  [] Reports taking another medication(s) but cannot recall the name(s)  [] Refuses to say.  [] Did not review with patient. Cannot assess.     Comments: none    Rocíoanna Blas on 5/17/2022 at 7:59 AM       Discrepancies: [x] No []Not Applicable []Yes: listed below    Issues completing PTA medication reconciliation:  [] On hold for a long time  [] Waited for a call back  [] Fax didn't come through  [] Fax took a long time  [] Other:    Notifying appropriate party of changes/additions/discrepancies:  []No pertinent changes made, notification not necessary.   [x] Notified attending provider via text page/phone call  [] Notified attending provider in person  [x] Notified  pharmacy  [] Notified nurse  [] Medications have not been reconciled by a provider yet, notification not necessary  [] Pt is not admitted to floor yet, PTA meds completed before admission.   Medications Prior to Admission   Medication Sig Dispense Refill Last Dose     acetaminophen (TYLENOL) 325 MG tablet Take 325 mg by mouth every 4 hours as needed for pain rated 1-4. Give 650 mg (2 tablets) for pain rated 5-10. Do not exceed 4000 mg per day from all sources.   Unknown at Unknown time     acetaminophen (TYLENOL) 500 MG tablet Take 500 mg by mouth At Bedtime   5/15/2022 at HS     aspirin 81 MG EC tablet Take 81 mg by mouth At Bedtime   5/15/2022 at HS     atorvastatin (LIPITOR) 40 MG tablet Take 40 mg by mouth At Bedtime   5/15/2022 at HS     carboxymethylcellulose (REFRESH LIQUIGEL) 1 % ophthalmic solution Apply 1-2 drops to eye daily as needed for dry eyes per package instructions.   Unknown at Unknown time     clopidogrel (PLAVIX) 75 MG tablet Take 75 mg by mouth At Bedtime   5/15/2022 at HS     finasteride (PROSCAR) 5 MG tablet Take 5 mg by mouth At Bedtime   5/15/2022 at HS     ipratropium (ATROVENT) 0.03 % nasal spray Spray 2 sprays into both nostrils every morning   5/16/2022 at AM     melatonin 3 MG tablet Take 9 mg by mouth At Bedtime   5/15/2022 at HS     mineral oil-hydrophilic petrolatum (AQUAPHOR) external ointment Apply topically At Bedtime into both nostrils for epistaxis. Use a pea sized amount.   5/15/2022 at HS     mirtazapine (REMERON) 45 MG tablet Take 45 mg by mouth At Bedtime   5/15/2022 at HS     tamsulosin (FLOMAX) 0.4 MG capsule Take 0.8 mg by mouth At Bedtime   5/15/2022 at HS     famotidine (PEPCID) 40 MG tablet Take 40 mg by mouth At Bedtime   5/15/2022 at HS               Medication Dispense History (from 5/17/2021 to 5/17/2022)  Expand All  Collapse All    Amoxicillin     Dispensed Days Supply Quantity Provider Pharmacy   AMOXICILLIN 875 MG TABLET 04/21/2022 10 20 Units DASH RECINOS  [facility] ...   AMOXICILLIN 875 MG TABLET [facility]/30/2022 [facility] ...        Atorvastatin Calcium     Dispensed Days Supply Quantity Provider Pharmacy   ATORVASTATIN 40 MG TABLET 05/06/2022 28 28 [facility] ...   ATORVASTATIN 40 MG TABLET 04/08/2022 28 28 Units [facility] ...   ATORVASTATIN CALCIUM 40MG TAB 03/11/2022 28 28 tablet [facility] PH[facility]Y [facility] ...   ATORVASTATIN 40 MG TABLET 02/09/2022 28 [facility] ...   ATORVASTATIN 40 MG TABLET 01/14/2022 28 28 Units [facility] ...   ATORVASTATIN 40 MG TABLET 12/16/2021 28 28 [facility] ...   ATORVASTATIN 40 MG TABLET 11/19/2021 28 [facility] ...   ATORVASTATIN 40 MG TABLET 10/25/2021 28 [facility] ...   ATORVASTATIN 40 MG TABLET 09/24/2021 28 28 [facility] ...   ATORVASTATIN 40 MG TABLET 08/30/2021 28 [facility] ...   ATORVASTATIN 40 MG TABLET 07/29/2021 28 28 Units [facility] ...   ATORVASTATIN 40 MG TABLET 07/02/2021 28 28 [facility] ...   ATORVASTATIN 40 MG TABLET 06/04/2021 28 28 Units [facility] ...        Azelastine HCl     Dispensed Days Supply Quantity Provider Pharmacy   AZELASTINE 0.1% (137 MCG) SPRY 03/10/2022 50 30 mL DASH RECINOS [facility] ...        Clopidogrel Bisulfate     Dispensed Days Supply Quantity Provider Pharmacy   CLOPIDOGREL 75 MG TABLET 05/06/2022 28 28 [place] ...   CLOPIDOGREL 75 MG TABLET 04/06/2022 28 28 [facility] ...   CLOPIDOGREL 75 MG TABLET 03/11/2022 28 28 Units [facility]S PHARMACY [facility] ...   CLOPIDOGREL 75 MG TABLET 02/09/2022 28 28 Units DASH REC[facility]'S PHARMACY  [facility] ...   CLOPIDOGREL 75 MG TABLET 01/14/2022 28 28 Units [facility] ...   CLOPIDOGREL 75 MG TABLET 12/16/2021 28 28 Units [facility] PHARMACY [facility] ...   CLOPIDOGREL 75 MG TABLET 11/19/2021 28 28 Units [facility]ASH REC[facility]OS [facility] ...   CLOPIDOGREL 75 MG TABLET 10/25/2021 28 28 Units [facility] ...   CLOPIDOGREL 75 MG TABLET 09/24/2021 28 28 Units [facility] ...   CLOPIDOGREL 75 MG TABLET 08/30/2021 28 28 Units DASH REC[facility]OS [facility] ...   CLOPIDOGREL 75 MG TABLET 07/29/2021 28 28 Units DASH REC[facility]OS [facility] ...   CLOPIDOGREL 75 MG TABLET 07/02/2021 28 28 Units DASH RECINOS [facility] ...   CLOPIDOGREL 75 MG TABLET 06/04/2021 28 28 Units DASH REC[facility]OS [facility] ...        Famotidine     Dispensed Days Supply [facility]   FAMOTIDINE 40 MG TABLET 05/06/2022 28 28 Units DASH REC[facility]OS [facility] ...   FAMOTIDINE 40 MG TABLET 04/06/2022 28 28 Units [facility] ...   FAMOTIDINE 40 MG TABLET 03/11/2022 28 28 [facility] ...   FAMOTIDINE 40 MG TABLET 02/09/2022 28 28 Units [facility] ...   FAMOTIDINE 40 MG TABLET 01/14/2022 28 28 Units [facility] ...   FAMOTIDINE 40 MG TABLET 12/16/2021 28 28 Units [facility] ...   FAMOTIDINE 40 MG TABLET 11/19/2021 28 28 Units [facility] ...   FAMOTIDINE 40 MG TABLET 10/27/2021 28 28 Units [facility] ...   FAMOTIDINE 40 MG TABLET 10/06/2021 28 28 Units [facility] ...   FAMOTIDINE 40 MG TABLET 09/15/2021 28 28 Units [facility] PHARMACY [facility] ...   FAMOTIDINE 40 MG TABLET 08/12/2021 35 35 Units [facility] PHARMACY [facility] ...        Finasteride     Dispensed Days Supply Quantity Provider Pharmacy   FINASTERIDE 5 MG TABLET 05/06/2022 28 28 Units  [facility] PH[facility]ACY [facility] ...   FINASTERIDE 5 MG TABLET 04/08/2022 28 28 Units [facility] PH[facility] ...   FINASTERIDE 5 MG TABLET 03/11/2022 28 28 Units [facility] ...   FINASTERIDE 5 MG TABLET 02/09/2022 28 28 Units [facility] PH[facility] ...   FINASTERIDE 5 MG TABLET 01/14/2022 28 28 Units [facility] PH[facility] ...   FINASTERIDE 5 MG TABLET 12/16/2021 28 28 Units [facility] PH[facility] ...   FINASTERIDE 5 MG TABLET 11/19/2021 28 28 Units [facility] PH[facility] ...   FINASTERIDE 5 MG TABLET 10/25/2021 28 28 Units [facility] PH[facility] ...   FINASTERIDE 5 MG TABLET 09/24/2021 28 28 Units [facility] PH[facility] ...   FINASTERIDE 5 MG TABLET 08/30/2021 28 28 Units [facility] ...   FINASTERIDE 5 MG TABLET 07/29/2021 28 28 Units [facility] PH[facility] ...   FINASTERIDE 5 MG TABLET 07/02/2021 28 28 Units [facility] PH[facility] ...   FINASTERIDE 5 MG TABLET 06/04/2021 28 28 Units [facility] PHARM[facility] ...        Fluticasone Propionate     Dispensed Days Supply Quantity Provider Pharmacy   FLUTICASONE PROP 50 MCG SPRAY 02/10/2022 60 16 each [facility] ...        Ipratropium Bromide     Dispensed Days Supply Quantity Provider Pharmacy   IPRATROPIUM 0.03% SPRAY 04/28/2022 44 30 mL [facility] ...        Mirtazapine     Dispensed Days Supply Quantity Provider Pharmacy   MIRTAZAPINE 45 MG TABLET 05/06/2022 28 28 Units [facility] PHARMACY [facility] ...   MIRTAZAPINE 45 MG TABLET 04/15/2022 28 28 Units [facility] PHARMACY [facility] ...   MIRTAZAPINE 45 MG TABLET 03/25/2022 28 28 Units [facility] PH[facility] ...   MIRTAZAPINE 45 MG TABLET 03/10/2022 20 20 Units [facility] PH[facility] ...   MIRTAZAPINE 30MG TAB 02/21/2022 14 14 tablet [facility] PH[facility]ACY [facility] ...   MIRTAZAPINE  30 MG TABLET 01/31/2022 28 28 Units [facility] ...   MIRTAZAPINE 30 MG TABLET 01/13/2022 21 21 Units [facility] ...   MIRTAZAPINE 15 MG TABLET 11/19/2021 28 28 [facility] ...   MIRTAZAPINE 15 MG TABLET 10/26/2021 28 28 Units [facility] ...   MIRTAZAPINE 15 MG TABLET 09/24/2021 28 28 Units [facility] REC[facility] ...   MIRTAZAPINE 15 MG TABLET 08/30/2021 28 28 Units [facility] ...   MIRTAZAPINE 15 MG TABLET 07/29/2021 28 28 Units [facility] ...   MIRTAZAPINE 15 MG TABLET 07/02/2021 28 28 Units [facility] ...   MIRTAZAPINE 15 MG TABLET 06/04/2021 28 28 Units DASH REC[facility]OS [facility] ...        Nitrofurantoin Monohyd Macro     Dispensed Days Supply Quantity Provider Pharmacy   NITROFURANTOIN MONO- MG 04/22/2022 10 20 Units [facility] PH[facility] ...   nitrofurantoin monohydrate/macrocrystals 100 mg capsule 04/22/2022 10 20 Units DASH REC[facility]OS [facility]...   NITROFURANTOIN MONO- MG 07/22/2021 7 14 Units [facility] ...        Tamsulosin HCl     Dispensed Days Supply Quantity Provider Pharmacy   TAMSULOSIN HCL 0.4 MG CAPSULE 05/06/2022 28 56 Units DASH REC[facility] ...   TAMSULOSIN HCL 0.4 MG CAPSULE 04/06/2022 28 56 Units DASH REC[facility] ...   TAMSULOSIN HCL 0.4 MG CAPSULE 03/11/2022 28 56 Units [facility] ...   TAMSULOSIN HCL 0.4 MG CAPSULE 02/09/2022 28 56 Units DASH REC[facility] ...   TAMSULOSIN HCL 0.4 MG CAPSULE 01/14/2022 28 56 Units [facility] ...   TAMSULOSIN HCL 0.4 MG CAPSULE 12/16/2021 28 56 Units [facility] ...   TAMSULOSIN HCL 0.4 MG CAPSULE 11/19/2021 28 56 Units [facility] ...   TAMSULOSIN HCL 0.4 MG CAPSULE 10/25/2021 28  56 Units [facility] ...   TAMSULOSIN HCL 0.4 MG CAPSULE 09/24/2021 28 56 Units DASH RECINOS [facility] ...   TAMSULOSIN HCL 0.4 MG CAPSULE 08/30/2021 28 56 Units [facility] ...   TAMSULOSIN HCL 0.4 MG CAPSULE 07/29/2021 28 56 Units DASH RECINOS [facility] ...   TAMSULOSIN HCL 0.4 MG CAPSULE 07/02/2021 28 [facility]   TAMSULOSIN HCL 0.4 MG CAPSULE 06/04/2021 28 56 Units [facility] ...        Disclaimer    Certain dispenses may not be available or accurate in this report, including over-the-counter medications, low cost prescriptions, prescriptions paid for by the patient or non-participating sources, or errors in insurance claims information. The provider should independently verify medication history with the patient.    External Sources

## 2022-05-17 NOTE — PROGRESS NOTES
[facility]    Medicine Progress Note - Hospitalist Service    Date of Admission:  5/16/2022    Assessment & Plan           Antonio Shoemaker is a 82 year old male admitted on 5/16/2022.      Sudden worsening of functional status, walking ability and cognitive status:   MRI of head. Stop statin therapy, check CK, vitamin D, B12, Thiamine, Folate  -5/17: Patient's decline in functional status may coincide with the increase in mirtazapine due to severe anxiety that occurred 2 weeks ago.  Mirtazapine dose has been decreased to 7.5; will continue to follow patient's cognitive and functional status.    Hypothermia:   Lata hugger warmer. The etiology is not clear, but dementia can be a cause. Will stop Lisinopril, lower Metoprolol dosing from 50 mg XL to 12.5 mg XL daily, dose-reduce Remeron from 30 mg to 7.5 mg.  -5/17:   Patient's body temperature has been within normal limit since admission last night.  The etiology of the hypothermia is not clear; will check random cortisol.    Chronic cough, with gradual worsening:   will order dysphagia diet with thickened liquids, consult Speech to evaluate and treat.  -5/17: Patient's daughter reports that right after he drank water today he started coughing.  I spoke to speech therapist today who is planning to evaluate patient's swallowing later today.    Slight hypercapnia:   check BNP, RT treatments, oxygen as needed  -5/17: We will repeat blood gas in the morning.    Low albumin, in decline since 2020:   check prealbumin  -5/17: Patient's nutritional status has not been optimal.  We will obtain swallowing evaluation and will promote patient's p.o. intake with appropriate diet modality.    Abnormal UA:   small leukocyte esterase and few bacteria. This is not strong evidence for acute cystitis without hematuria, but he has a history of E faecalis and E Coli UTI's which affect his abilities, so will dose a short course of Unasyn (after review of the two positive culture  "results) and monitor current UA culture  -5/17:     Anemia, macrocytic:   check iron stores       Diet: Pureed Diet (level 4) Mildly Thick (level 2)    DVT Prophylaxis: Pneumatic Compression Devices  Boudreaux Catheter: Not present  Central Lines: None  Cardiac Monitoring: ACTIVE order. Indication: mental status changes, dizziness  Code Status: No CPR- Do NOT Intubate      Disposition Plan   Expected Discharge:  pending  Anticipated discharge location:  Awaiting care coordination huddle  Delays:       Total time spent 45 min, 30 min were spent on gathering data, speaking with family/consultant, and coordinating care.       The patient's care was discussed with the Bedside Nurse, Patient and Patient's Family.    Manfred Atkinson MD  Hospitalist Service  [facility]  Securely message with the Vocera Web Console (learn more here)  Text page via [employer] Paging/Directory         Clinically Significant Risk Factors Present on Admission             # Hypoalbuminemia: Albumin = 2.4 g/dL (Ref range: 3.4 - 5.0 g/dL) on admission, will monitor as appropriate    # Platelet Defect: home medication list includes an antiplatelet medication   # Overweight: Estimated body mass index is 29.57 kg/m  as calculated from the following:    Height as of this encounter: 1.651 m (5' 5\").    Weight as of this encounter: 80.6 kg (177 lb 11.1 oz).      ______________________________________________________________________    Interval History   Patient is 82-year-old male with a history of BPH, carotid artery disease, hyperlipidemia, status post CVA, osteoarthritis, heart failure with reduced ejection fraction, status post a left knee replacement, who recently.  Patient presented to the emergency room on May 16 due to increasing confusion and weakness as well as hallucinations.  Patient also has had decreased p.o. intake.  In the emergency room    Data reviewed today: I reviewed all medications, new labs and imaging results over the last 24 " hours. I personally reviewed no images or EKG's today.    Physical Exam   Vital Signs: Temp: 97  F (36.1  C) Temp src: Tympanic BP: 119/50 Pulse: 74   Resp: 16 SpO2: 94 % O2 Device: None (Room air)    Weight: 177 lbs 11.05 oz  General Appearance: Lying in bed in no acute distress  Respiratory: Clear to auscultation bilaterally  Cardiovascular: S1-S2, regular rhythm and rate, no murmurs rubs gallops  GI: Soft, nontender, nondistended  Skin: Intact  Other: Neuro: Patient is alert and oriented to self but does not know the location or time.    Data   Recent Labs   Lab 05/17/22  0801 05/17/22  0724 05/17/22  0654 05/17/22  0543 05/16/22  1240   WBC  --   --   --  6.1 7.0   HGB  --   --   --  11.4* 13.0*   MCV  --   --   --  104* 103*   PLT  --   --   --  154 179   NA  --   --   --  144 142   POTASSIUM  --   --   --  4.4 4.8   CHLORIDE  --   --   --  116* 113*   CO2  --   --   --  25 26   BUN  --   --   --  25 28   CR  --   --   --  1.03 0.96   ANIONGAP  --   --   --  3 3   JUAN  --   --   --  7.9* 8.7   * 78 67* 66* 119*   ALBUMIN  --   --   --  2.4* 2.8*   PROTTOTAL  --   --   --  6.0* 7.5   BILITOTAL  --   --   --  0.4 0.5   ALKPHOS  --   --   --  144 179*   ALT  --   --   --  98* 136*   AST  --   --   --  62* 98*   LIPASE  --   --   --   --  181     Recent Results (from the past 24 hour(s))   CT Head w/o Contrast    Narrative    PROCEDURE: CT HEAD W/O CONTRAST   5/16/2022 2:06 PM    HISTORY:Male, age,  82 years, , , Mental status change, unknown cause    COMPARISON: CT scan of the brain 5/12/2022    TECHNIQUE: CT of the brain without contrast.    FINDINGS: Ventricles and sulci are similar in appearance with  localized encephalomalacia again seen in the left cerebellar  hemisphere . Gray and white matter demonstrate scattered areas of  decreased density.    There is no evidence of mass, mass effect or midline shift. No  evidence of acute hemorrhage. Polypoid mucosal thickening is again  seen along the anterior  aspects of the nasal septum and in the  inferior aspects the right maxillary sinus.    The bones are unremarkable. No fracture.       Impression    IMPRESSION:   No acute intracranial abnormality.  No acute fracture.     Left cerebellar encephalomalacia and white matter changes suggesting  small vessel disease are similar in appearance compared to the prior  examination as is polypoid mucosal thickening of the septum and right  maxillary sinus    JULIO ISAAC MD         SYSTEM ID:  [id number]   CT Abdomen Pelvis w Contrast    Narrative    Exam:CT ABDOMEN PELVIS W CONTRAST    History: 82 years Male UTI, incontinence and increasing confusion.     Comparisons: 4/12/2022, 3/7/2019    Technique: Axial CT imaging of the abdomen and pelvis was performed  with intravenous contrast. Coronal and sagittal reconstructions were  obtained      FINDINGS:  Lung bases: There is bilateral dependent atelectasis, similar to  slightly worse compared to the prior study.    Abdomen:  Liver:Unremarkable  Gallbladder and biliary tree: There is cholelithiasis. There is no  biliary dilatation.  Pancreas:Unremarkable  Spleen:Unremarkable  Adrenals:Normal    Kidneys and ureters: Bilateral renal cysts are present. There is  bilateral symmetric renal enhancement without evidence of  hydronephrosis or obstruction.    Lymph nodes:There is no significant lymphadenopathy    Bowel:No abnormally distended or thickened loops of bowel are present.  There is no evidence of bowel obstruction.    Appendix:Unremarkable    Vessels: There is atherosclerotic disease. There bilateral iliac  stents. The vessels are patent.    Osseous structures:Unremarkable    Pelvis: No mass or lymphadenopathy.  There is osteopenia. Multilevel degenerative changes of the lumbar  spine are present.          Impression    IMPRESSION: No acute intra-abdominal findings. No evidence of  hydronephrosis or hydroureter. The graft cholelithiasis without  evidence of biliary  dilatation.    JANY RAZO MD         SYSTEM ID:  [id number]   XR Chest Port 1 View    Narrative    PROCEDURE: XR CHEST PORT 1 VIEW 5/16/2022 2:58 PM    HISTORY: hypotension    COMPARISONS: 12/23/2018.    TECHNIQUE: Single portable view.    FINDINGS: Heart is mildly enlarged but is similar to the prior exam.  There is ectasia of the aorta. There is some mild interstitial changes  bilaterally, improved compared to the prior exam where there is  vascular congestion and interstitial edema. There is some mild linear  change at the left lung base with probable very small left-sided  effusion.    Degenerative changes seen in the spine. There is a right convex  scoliosis.      Impression    IMPRESSION: Cardiomegaly. Probable atelectasis at the left lung base.    DAE ZAMBRANO MD         SYSTEM ID:  [id number]     Medications       aspirin  81 mg Oral At Bedtime     clopidogrel  75 mg Oral At Bedtime     finasteride  5 mg Oral At Bedtime     ipratropium - albuterol 0.5 mg/2.5 mg/3 mL  3 mL Nebulization BID     mirtazapine  7.5 mg Oral At Bedtime     sodium chloride (PF)  3 mL Intracatheter Q8H     sodium chloride (PF)  5 mL Intravenous Q8H     tamsulosin  0.8 mg Oral At Bedtime    to a skilled nursing facility.

## 2022-05-17 NOTE — PLAN OF CARE
Reason for hospital stay:  Altered Mental Status  [facility]  Most recent vitals: /72 (BP Location: Right arm, Patient Position: Semi-Nelson's)   Pulse 75   Temp 97.1  F (36.2  C) (Temporal)   Resp 14   SpO2 95%     Pain Management:  Denies pain  LOC:  A&O x4- pt in and out of confusion. Forgetful. Daughter states pt has some confusion & forgetfulness at baseline, currently more confused than normal. Did have some visual hallucinations tonight after the lights were dimmed pt reported seeing a mouse multiple times. Pt does not appear to have slept tonight.   Cardiac:  Tele: Bigeminy 70's, 1 degree AVB, inverted T wave, and SR with frequent PVC's per ICU staff. Peripheral pulses are weak, palpable, and improved since admission.     Bare hugger removed around 0100. Temp stable at 97.7  Respiratory:  Infrequent, weak congested cough. LS coarse, diminished.   GI:  +BS  :  Incontinence   Skin Issues:  Pressure injury to coccyx. Excoriation and scattered scratches with scabbing to groin folds; moist and yeasty. Cleansed, dried, and inter dry applied.   ABX:  Unasyn     Nutrition: Pureed and mildly thick. Speech eval ordered.   ADL's:  Not oob this shift. Daughter states patient is normally up with minimal assistance and fairly recently started using a walker. Very weak upon admission. PT/OT consults placed.   Safety:  Alarms active, room door open and near nurse's station.     Face to face report given with opportunity to observe patient.    Report given to CHRISTIANA Awan RN   5/17/2022  3:40 AM

## 2022-05-17 NOTE — PROGRESS NOTES
[facility] Inpatient Admission Note:    Patient admitted to 3218/3218-1 at approximately 1945 via bed accompanied by daughter from emergency room . Report received from CHRISTIANA Dobson in SBAR format at 1911 via telephone. Patient transferred to bed via slide board.. Patient is alert and oriented X 3, denies pain; rates at 0 on 0-10 scale.  Patient oriented to room, unit, hourly rounding, and plan of care. Explained admission packet and patient handbook with patient bill of rights brochure. Will continue to monitor and document as needed.     Inpatient Nursing criteria listed below was met:    Health care directives status obtained and documented: Yes    Patient identifies a surrogate decision maker: Yes If yes, who: Marcella, daughter Contact Information: see facesheet     If initial lactic acid greater than 2.0, repeat lactic acid drawn within one hour of arrival to unit: NA. If no, state reason: NA    Clergy visit ordered if patient requests: N/A    Skin issues/needs documented: Yes    Isolation Patient: no Education given, correct sign in place and documentation row added to PCS:  NA    Fall Prevention Yes: Care plan updated, education given and documented, sticker and magnet in place: Yes    Care Plan initiated: Yes    Education Documented (including assessment): Yes    Patient has discharge needs : TBD If yes, please explain: TBD

## 2022-05-17 NOTE — PROGRESS NOTES
Care Transitions focused note:      LVM for daughter Beryl to complete Medicare letter.    Alyse Omalley CM

## 2022-05-17 NOTE — ED PROVIDER NOTES
History     Chief Complaint   Patient presents with     Altered Mental Status     HPI  Antonio Shoemaker is a 82 year old male who sent to ER by ambulance with complaints of increased confusion, weakness and hallucinations.  Patient recently moved to the [facility] on Friday.  He was doing relatively well at that time but over the past couple of weeks he has had some increased confusion and decline in mentation.  Patient recently had a head on 5/12/22 which was normal with and without contrast he also recently had a urogram back on 4/12/2022 he had had some history of urinary tract infections was on several different antibiotics.  No cultures were ever done.  Patient's daughter indicates that he had been eating and drinking relatively well over the weekend.  Maybe not drinking as much is normal.  Patient's had some hallucinations of his wife and other people that have passed away over the weekend and he also has noticed those here in the ER today as well.  Patient seems to answer questions relatively well when asked about them will have some confusion about where he is at and how he got to places.  His past medical history is BPH, carotid artery disease, hyperlipidemia, history of CVA, osteoarthritis, heart failure with reduced ejection fraction, status post left knee replacement.  On arrival family indicates there is been no nausea vomiting.  No recent falls or trauma.  Patient has had some history of dizziness and they have been contributing to the Flomax.  Patient denies any blurry vision or double vision he has been experiencing some floaters.  Patient had no recent fevers.  No cough congestion runny nose.  No recent exposures to anybody with COVID-19.  Medications have been reviewed.    Allergies:  No Known Allergies    Problem List:    Patient Active Problem List    Diagnosis Date Noted     Altered mental status 05/16/2022     Priority: Medium     Hypothermia, initial encounter 05/16/2022      Priority: Medium     Hypotension, unspecified hypotension type 05/16/2022     Priority: Medium     Heart failure with reduced ejection fraction, NYHA class II (H) 02/14/2019     Priority: Medium     Benign prostatic hyperplasia with urinary retention 06/27/2016     Priority: Medium     History of CVA (cerebrovascular accident) 06/27/2016     Priority: Medium     Status post total left knee replacement 06/27/2016     Priority: Medium     Carotid artery stenosis 09/24/2009     Priority: Medium     Dyslipidemia 07/29/2009     Priority: Medium     Encounter for long-term (current) use of other medications 07/29/2009     Priority: Medium     Incidental lung nodule 07/02/2008     Priority: Medium     Overview:   IMO Update 10/11       CVA (cerebrovascular accident) (H) 06/21/2008     Priority: Medium     Overview:   IMO Update 10/11       Osteoarthritis of knee 03/12/2008     Priority: Medium     Overview:   IMO Update 10/11       Ascites 07/28/2007     Priority: Medium     Overview:   IMO Update          Past Medical History:    History reviewed. No pertinent past medical history.    Past Surgical History:    Past Surgical History:   Procedure Laterality Date     COLONOSCOPY  8/12/2013    Procedure: COLONOSCOPY;   Upper Endoscopy w/ biopsies and COLONOSCOPY;  Surgeon: Sheri Joseph DO;  Location: [facility]     NASAL SCOPE,BX/RMV POLYP/DEBRID       ORTHOPEDIC SURGERY      knee     VASCULAR SURGERY      carotid       Family History:    History reviewed. No pertinent family history.    Social History:  Marital Status:   [5]  Social History     Tobacco Use     Smoking status: Former Smoker     Smokeless tobacco: Never Used   Substance Use Topics     Alcohol use: No     Drug use: No        Medications:    No current outpatient medications on file.        Review of Systems   Unable to perform ROS: Mental status change       Physical Exam   BP: 139/68  Pulse: 55  Temp: (!) 90.6  F (32.6  C)  Resp: 14  SpO2: 98  %      Physical Exam  Vitals and nursing note reviewed.   Constitutional:       General: He is not in acute distress.  HENT:      Head: Normocephalic and atraumatic.      Mouth/Throat:      Mouth: Mucous membranes are moist.      Pharynx: Oropharynx is clear.   Eyes:      General: No visual field deficit.     Extraocular Movements: Extraocular movements intact.      Right eye: Normal extraocular motion.      Left eye: Normal extraocular motion.      Pupils: Pupils are equal, round, and reactive to light.   Cardiovascular:      Rate and Rhythm: Normal rate and regular rhythm.      Heart sounds: Normal heart sounds. No murmur heard.  Pulmonary:      Effort: Pulmonary effort is normal.      Breath sounds: Normal breath sounds.   Abdominal:      General: Bowel sounds are normal. There is no distension.      Palpations: Abdomen is soft.   Musculoskeletal:         General: Normal range of motion.      Cervical back: Normal range of motion and neck supple.   Skin:     Capillary Refill: Capillary refill takes 2 to 3 seconds.      Comments: Patient is cold on arrival.   Neurological:      Mental Status: He is lethargic and confused.      GCS: GCS eye subscore is 4. GCS verbal subscore is 5. GCS motor subscore is 6.      Cranial Nerves: No cranial nerve deficit, dysarthria or facial asymmetry.      Deep Tendon Reflexes: Reflexes normal.      Comments: Unable to assess sensory motor coordination due to patient is bedbound.  Patient will respond to most questions appropriately.   Psychiatric:         Mood and Affect: Mood normal.      Comments: Slightly impaired memory.  He does ask some questions about where he is sad and how he got here today.  He asked how long he has been in the nursing home.         ED Course      82-year-old male presents the ER by ambulance with some mild confusion but does seem to answer some questions appropriately.  On arrival patient is hypotensive and hypothermic.  Patient had a core body  temperature of 90.6.  Pressures seem to slightly trend down to some hypotensive readings however when we moved to the blood pressure cuff from the left arm to the right arm we gotten more normal pressures.  Patient had 2 IVs started and was given 2 L of warm saline Lata hugger was placed.  Labs, EKG, chest x-ray, head CT, abdomen pelvis CT and UA were requested.  His labs overall were relatively unremarkable.  CBC and chemistry panel normal.  Lipase, normal venous blood gases, normal UA showed just some mild red cells and only 4 white cells we do have a culture pending.  COVID-19 is negative.  Sh was 5.25 but T4 for free was normal troponin negative lactic acid 0.8 CRP 18.7.  Lipase 181.  EKG shows no significant changes.  Chest x-ray was normal head CT was normal as well as abdomen and pelvis he did show some mild cholelithiasis but there was no signs of cholecystitis or duct dilation.  Spoke with Dr. Zamora in regards to patient continuing to the hypothermic at 95.1 over a 5 to 6-hour period of time.  At this time patient will be admitted for hypothermia hypotension and mental status changes unknown cause.  Patient will ultimately get a lumbar puncture and MRI of the brain as soon as possible for further evaluation and treatment.  Daughter did indicate that patient was a DNR/DNI at this time.  Did review case with Dr. Zamora in detail and cannot think of any other further interventions or diagnostic treatments at this time to help the hypothermia.  Again unknown cause of the hypothermia.  [facility] condition under Dr. Zamora.                   EKG Interpretation:      Interpreted by Lavinia Denson PA-C  Time reviewed: 1250  Symptoms at time of EKG: None   Rhythm: sinus bradycardia  Rate: 52  Axis: Normal  Ectopy: none  Conduction: normal  ST Segments/ T Waves: T wave abnormality possible lateral ischemia however I do not see any specific ST elevation or  depression.  Q Waves: none  Comparison to prior: 7/22/22    Clinical Impression: no acute changes    Critical Care time:  90 minutes, hypothermia, hypotension           Results for orders placed or performed during the hospital encounter of 05/16/22 (from the past 24 hour(s))   CBC with platelets differential    Narrative    The following orders were created for panel order CBC with platelets differential.  Procedure                               Abnormality         Status                     ---------                               -----------         ------                     CBC with platelets and d...[[id number]]  Abnormal            Final result                 Please view results for these tests on the individual orders.   Comprehensive metabolic panel   Result Value Ref Range    Sodium 142 133 - 144 mmol/L    Potassium 4.8 3.4 - 5.3 mmol/L    Chloride 113 (H) 94 - 109 mmol/L    Carbon Dioxide (CO2) 26 20 - 32 mmol/L    Anion Gap 3 3 - 14 mmol/L    Urea Nitrogen 28 7 - 30 mg/dL    Creatinine 0.96 0.66 - 1.25 mg/dL    Calcium 8.7 8.5 - 10.1 mg/dL    Glucose 119 (H) 70 - 99 mg/dL    Alkaline Phosphatase 179 (H) 40 - 150 U/L    AST 98 (H) 0 - 45 U/L     (H) 0 - 70 U/L    Protein Total 7.5 6.8 - 8.8 g/dL    Albumin 2.8 (L) 3.4 - 5.0 g/dL    Bilirubin Total 0.5 0.2 - 1.3 mg/dL    GFR Estimate 78 >60 mL/min/1.73m2   CRP inflammation   Result Value Ref Range    CRP Inflammation 18.7 (H) 0.0 - 8.0 mg/L   Troponin I   Result Value Ref Range    Troponin I High Sensitivity 68 <79 ng/L   Lactic acid whole blood   Result Value Ref Range    Lactic Acid 1.5 0.7 - 2.0 mmol/L   CBC with platelets and differential   Result Value Ref Range    WBC Count 7.0 4.0 - 11.0 10e3/uL    RBC Count 3.87 (L) 4.40 - 5.90 10e6/uL    Hemoglobin 13.0 (L) 13.3 - 17.7 g/dL    Hematocrit 39.8 (L) 40.0 - 53.0 %     (H) 78 - 100 fL    MCH 33.6 (H) 26.5 - 33.0 pg    MCHC 32.7 31.5 - 36.5 g/dL    RDW 17.8 (H) 10.0 - 15.0 %    Platelet  Count 179 150 - 450 10e3/uL    % Neutrophils 69 %    % Lymphocytes 21 %    % Monocytes 6 %    % Eosinophils 4 %    % Basophils 0 %    % Immature Granulocytes 0 %    NRBCs per 100 WBC 0 <1 /100    Absolute Neutrophils 4.8 1.6 - 8.3 10e3/uL    Absolute Lymphocytes 1.4 0.8 - 5.3 10e3/uL    Absolute Monocytes 0.4 0.0 - 1.3 10e3/uL    Absolute Eosinophils 0.3 0.0 - 0.7 10e3/uL    Absolute Basophils 0.0 0.0 - 0.2 10e3/uL    Absolute Immature Granulocytes 0.0 <=0.4 10e3/uL    Absolute NRBCs 0.0 10e3/uL   Lipase   Result Value Ref Range    Lipase 181 73 - 393 U/L   Stuart Draw    Narrative    The following orders were created for panel order Stuart Draw.  Procedure                               Abnormality         Status                     ---------                               -----------         ------                     Extra Blue Top Tube[[id number]]                              Final result               Extra Red Top Tube[[id number]]                               Final result                 Please view results for these tests on the individual orders.   Extra Blue Top Tube   Result Value Ref Range    Hold Specimen     Extra Red Top Tube   Result Value Ref Range    Hold Specimen     UA with Microscopic reflex to Culture    Specimen: Urine, Catheter   Result Value Ref Range    Color Urine Light Yellow Colorless, Straw, Light Yellow, Yellow    Appearance Urine Clear Clear    Glucose Urine Negative Negative mg/dL    Bilirubin Urine Negative Negative    Ketones Urine Negative Negative mg/dL    Specific Gravity Urine 1.022 1.003 - 1.035    Blood Urine Negative Negative    pH Urine 5.5 4.7 - 8.0    Protein Albumin Urine Negative Negative mg/dL    Urobilinogen Urine Normal Normal, 2.0 mg/dL    Nitrite Urine Negative Negative    Leukocyte Esterase Urine Small (A) Negative    Bacteria Urine Few (A) None Seen /HPF    RBC Urine 8 (H) <=2 /HPF    WBC Urine 4 <=5 /HPF    Squamous Epithelials Urine 0 <=1 /HPF    Narrative     Urine Culture not indicated   Blood gas venous and oxyhgb   Result Value Ref Range    pH Venous 7.31 (L) 7.32 - 7.43    pCO2 Venous 51 (H) 40 - 50 mm Hg    pO2 Venous 46 25 - 47 mm Hg    Bicarbonate Venous 26 21 - 28 mmol/L    FIO2 21     Oxyhemoglobin Venous 76 (H) 70 - 75 %    Base Excess/Deficit (+/-) -0.9 -7.7 - 1.9 mmol/L   CT Head w/o Contrast    Narrative    PROCEDURE: CT HEAD W/O CONTRAST   5/16/2022 2:06 PM    HISTORY:Male, age,  82 years, , , Mental status change, unknown cause    COMPARISON: CT scan of the brain 5/12/2022    TECHNIQUE: CT of the brain without contrast.    FINDINGS: Ventricles and sulci are similar in appearance with  localized encephalomalacia again seen in the left cerebellar  hemisphere . Gray and white matter demonstrate scattered areas of  decreased density.    There is no evidence of mass, mass effect or midline shift. No  evidence of acute hemorrhage. Polypoid mucosal thickening is again  seen along the anterior aspects of the nasal septum and in the  inferior aspects the right maxillary sinus.    The bones are unremarkable. No fracture.       Impression    IMPRESSION:   No acute intracranial abnormality.  No acute fracture.     Left cerebellar encephalomalacia and white matter changes suggesting  small vessel disease are similar in appearance compared to the prior  examination as is polypoid mucosal thickening of the septum and right  maxillary sinus    JULIO ISAAC MD         SYSTEM ID:  [id number]   CT Abdomen Pelvis w Contrast    Narrative    Exam:CT ABDOMEN PELVIS W CONTRAST    History: 82 years Male UTI, incontinence and increasing confusion.     Comparisons: 4/12/2022, 3/7/2019    Technique: Axial CT imaging of the abdomen and pelvis was performed  with intravenous contrast. Coronal and sagittal reconstructions were  obtained      FINDINGS:  Lung bases: There is bilateral dependent atelectasis, similar to  slightly worse compared to the prior  study.    Abdomen:  Liver:Unremarkable  Gallbladder and biliary tree: There is cholelithiasis. There is no  biliary dilatation.  Pancreas:Unremarkable  Spleen:Unremarkable  Adrenals:Normal    Kidneys and ureters: Bilateral renal cysts are present. There is  bilateral symmetric renal enhancement without evidence of  hydronephrosis or obstruction.    Lymph nodes:There is no significant lymphadenopathy    Bowel:No abnormally distended or thickened loops of bowel are present.  There is no evidence of bowel obstruction.    Appendix:Unremarkable    Vessels: There is atherosclerotic disease. There bilateral iliac  stents. The vessels are patent.    Osseous structures:Unremarkable    Pelvis: No mass or lymphadenopathy.  There is osteopenia. Multilevel degenerative changes of the lumbar  spine are present.          Impression    IMPRESSION: No acute intra-abdominal findings. No evidence of  hydronephrosis or hydroureter. The graft cholelithiasis without  evidence of biliary dilatation.    JANY RAZO MD         SYSTEM ID:  [id number]   Asymptomatic COVID-19 Virus (Coronavirus) by PCR Nasopharyngeal    Specimen: Nasopharyngeal; Swab   Result Value Ref Range    SARS CoV2 PCR Negative Negative    Narrative    Testing was performed using the Xpert Xpress SARS-CoV-2 Assay on the   [employer]. Additional information about   this Emergency Use Authorization (EUA) assay can be found via the Lab   Guide. This test should be ordered for the detection of SARS-CoV-2 in   individuals who meet SARS-CoV-2 clinical and/or epidemiological   criteria. Test performance is unknown in asymptomatic patients. This   test is for in vitro diagnostic use under the FDA EUA for   laboratories certified under CLIA to perform high complexity testing.   This test has not been FDA cleared or approved. A negative result   does not rule out the presence of PCR inhibitors in the specimen or   target RNA in concentration below  the limit of detection for the   assay. The possibility of a false negative should be considered if   the patient's recent exposure or clinical presentation suggests   COVID-19. This test was validated by [facility] laboratory. This laboratory is certified under the Clinical Laboratory Improve  ment Amendments (CLIA) as qualified to perform high complexity testing.   XR Chest Port 1 View    Narrative    PROCEDURE: XR CHEST PORT 1 VIEW 5/16/2022 2:58 PM    HISTORY: hypotension    COMPARISONS: 12/23/2018.    TECHNIQUE: Single portable view.    FINDINGS: Heart is mildly enlarged but is similar to the prior exam.  There is ectasia of the aorta. There is some mild interstitial changes  bilaterally, improved compared to the prior exam where there is  vascular congestion and interstitial edema. There is some mild linear  change at the left lung base with probable very small left-sided  effusion.    Degenerative changes seen in the spine. There is a right convex  scoliosis.      Impression    IMPRESSION: Cardiomegaly. Probable atelectasis at the left lung base.    DAE ZAMBRANO MD         SYSTEM ID:  [id number]   Troponin I   Result Value Ref Range    Troponin I High Sensitivity 63 <79 ng/L   TSH   Result Value Ref Range    TSH 5.25 (H) 0.40 - 4.00 mU/L   Thyroxin T4 free   Result Value Ref Range    Free T4 1.05 0.76 - 1.46 ng/dL   CK total   Result Value Ref Range    CK 41 30 - 300 U/L   Procalcitonin   Result Value Ref Range    Procalcitonin <0.05 <0.05 ng/mL   Nt probnp inpatient   Result Value Ref Range    N terminal Pro BNP Inpatient 647 0 - 1,800 pg/mL   Prealbumin   Result Value Ref Range    Prealbumin 12 (L) 15 - 45 mg/dL   Iron and iron binding capacity   Result Value Ref Range    Iron 70 35 - 180 ug/dL    Iron Binding Capacity 222 (L) 240 - 430 ug/dL    Iron Sat Index 32 15 - 46 %   Lactic acid whole blood   Result Value Ref Range    Lactic Acid 0.8 0.7 - 2.0 mmol/L   Extra Tube    Narrative     The following orders were created for panel order Extra Tube.  Procedure                               Abnormality         Status                     ---------                               -----------         ------                     Extra Purple Top Tube[[id number]]                            Final result                 Please view results for these tests on the individual orders.   Extra Purple Top Tube   Result Value Ref Range    Hold Specimen JI    Ammonia   Result Value Ref Range    Ammonia 24 10 - 50 umol/L       Medications   aspirin (ASA) chewable tablet 81 mg (has no administration in time range)   bimatoprost (LUMIGAN) 0.01 % ophthalmic drops 1 drop (has no administration in time range)   clopidogrel (PLAVIX) tablet 75 mg (has no administration in time range)   dorzolamide (TRUSOPT) 2 % ophthalmic solution 1 drop (has no administration in time range)   finasteride (PROSCAR) tablet 5 mg (has no administration in time range)   latanoprost (XALATAN) 0.005 % ophthalmic solution 1 drop (has no administration in time range)   metoprolol succinate ER (TOPROL-XL) 24 hr half-tab 12.5 mg (has no administration in time range)   mirtazapine (REMERON) tablet TABS 7.5 mg (has no administration in time range)   tamsulosin (FLOMAX) capsule 0.8 mg (has no administration in time range)   lidocaine 1 % 0.1-1 mL (has no administration in time range)   lidocaine (LMX4) cream (has no administration in time range)   sodium chloride (PF) 0.9% PF flush 3 mL (has no administration in time range)   sodium chloride (PF) 0.9% PF flush 3 mL (has no administration in time range)   melatonin tablet 3 mg (has no administration in time range)   magnesium hydroxide (MILK OF MAGNESIA) suspension 30 mL (has no administration in time range)   ondansetron (ZOFRAN ODT) ODT tab 4 mg (has no administration in time range)     Or   ondansetron (ZOFRAN) injection 4 mg (has no administration in time range)   calcium carbonate (TUMS) chewable  tablet 1,000 mg (has no administration in time range)   ipratropium - albuterol 0.5 mg/2.5 mg/3 mL (DUONEB) neb solution 3 mL (3 mLs Nebulization Given 5/16/22 2043)   0.9% sodium chloride BOLUS (0 mLs Intravenous Stopped 5/16/22 1341)   0.9% sodium chloride BOLUS (0 mLs Intravenous Stopped 5/16/22 1400)   iopamidol (ISOVUE-370) solution 77 mL (77 mLs Intravenous Given 5/16/22 1412)   sodium chloride (PF) 0.9% PF flush 60 mL (60 mLs Intravenous Given 5/16/22 1411)       Assessments & Plan (with Medical Decision Making)     I have reviewed the nursing notes.    I have reviewed the findings, diagnosis, plan and need for follow up with the patient.      Current Discharge Medication List          Final diagnoses:   Altered mental status   Hypothermia, initial encounter   Hypotension, unspecified hypotension type       5/16/2022   [facility]     Lavinia Denson PA-C  05/17/22 6189

## 2022-05-17 NOTE — PROGRESS NOTES
05/17/22 1500   Signing Clinician's Name / Credentials   Signing clinician's name / credentials Jennifer Martínez MS CCC-SLP   Quick Adds   Rehab Discipline SLP   Quick Adds Interventions Speech Language Pathology   SLP - Dysphagia/Swallow   Minutes of Treatment   (Evaluation)   Symptoms Noted During/After Treatment None   Treatment Detail Clinical bedside swallow evaluation completed this afternoon.   SLP Discharge Planning   SLP Discharge Recommendation Transitional Care Facility   SLP Rationale for DC Rec Patient lived in SNF facility prior to being admitted to this facility.   SLP Brief overview of current status  Patient seen this afternoon for clinical bedside swallow evaluation. Recommend IDDSI 4 (pureed) and IDDSI 3 (moderately/honey) thickened liquids via spoon only. Recommend 1:1 feeder as patient was dependent on assistance from SLP for all oral intake. 1:1 feeder should provide patient with small single bites/spoonfuls of liquids and pace amounts. Patient should be monitored for increased in coughing during PO intake and if this does occur PO intake should be help. Recommend aspiration precuations be followed.   Additional Documentation   SLP Plan Continues to assess safest/least restrictive diet recommendations.   Total Session Time   Total Session Time (minutes)   (Evaluation only)       Recommendations for Feeding:  - Patient requires 1:1 supervision with eating and drinking  - Liquids via spoon only   - Patient must sit in the chair at 90 degrees (may require pillows behind back)  - Eliminate all distractions; turn off the TV  - Patient should only eat/drink when they are fully alert  - Provide small bites and small spoonfuls  - Alternate between a bite of food and a sip of liquid  - Check for oral pocketing  - Swallow bite of food/liquid before offering another bite/sip   - Patient must remain upright in chair at least 30-45 minutes after oral intake    NOTE: If patient becomes too tired, there are  noted changes in their vocal quality/breathing (wet and gurgly), or they begin coughing frequently, stop feeding immediately and complete oral cares. SLP to re-assess swallowing

## 2022-05-17 NOTE — SIGNIFICANT EVENT
Patient's oxygen requirement is increasing and his lungs are becoming more rhonchorous.  I suspect the patient might be aspirating.  Earlier today speech therapy saw the patient and recommended puréed diet with honey thick liquids.  It appears that patient is aspirating; I spoke with the patient's daughter regarding what he would have wanted if he had intact mind.  She recommends giving him food if he wants it and hungry even at the risk of aspiration.  Patient's daughter also would like us to refer patient to hospice tomorrow.    Manfred Atkinson MD

## 2022-05-17 NOTE — PROGRESS NOTES
Called by RN to give mucomyst neb.  Patient refused treatment once in room.  Just wants to go home.  Did have patient cough hard, and he had a productive cough for thick tan secretions.  On room air.

## 2022-05-17 NOTE — PROGRESS NOTES
05/17/22 1500   General Information   Onset of Illness/Injury or Date of Surgery 05/16/22   Referring Physician Manfred Atkinson MD   Patient/Family Therapy Goal Statement (SLP) None stated   Pertinent History of Current Problem Patient is an 82 year old male who was admitted on 5/16. Patients current diet recommendations are IDDSI 4 (pureed) and mildly/nectar thickened liquids. Per nursing staff patient had increased coughing on nectar/mildly thickened liquids at lunch.   General Observations Patients daughter present at bedside. Towards the end of the evaluation patient expressed refusals to additional PO trials.   Type of Evaluation   Type of Evaluation Swallow Evaluation   Vocal Quality/Secretion Management (Oral Motor)   Vocal Quality (Oral Motor) wet/gurgly   Secretion Management (Oral Motor) WNL   General Swallowing Observations   Respiratory Support (General Swallowing Observations) none   Current Diet/Method of Nutritional Intake (General Swallowing Observations, NIS) mildly thick liquids (level 2);pureed (level 4)   Swallowing Evaluation Clinical swallow evaluation   Clinical Swallow Evaluation   Feeding Assistance dependent   Additional evaluation(s) completed today No   Rationale for completing additional evaluation A clinical bedside swallow evaluation cannot rule out silent aspiration. If primary care provider or other suspects that patient is silently aspirating a video swallow study may be warranted.   Clinical Swallow Evaluation Textures Trialed moderately thick liquids/liquidized;pureed   Clinical Swallow Eval: Moderately Thick Liquids   Mode of Presentation spoon;fed by clinician   Volume Presented 1 oz.   Oral Phase WFL   Pharyngeal Phase intact   Diagnostic Statement Patinet tolerated 1 oz. of moderately thickened liquids via spoon with 1:1 assistance from clinician. No overt signs/symptoms of aspiration were witnessed.   Clinical Swallow Evaluation: Puree Solid Texture Trial   Mode of  Presentation, Puree spoon;fed by clinician   Volume of Puree Presented 1oz   Oral Phase, Puree WFL   Pharyngeal Phase, Puree intact   Diagnostic Statement Patinet tolerated 1 oz. of pureed textures via spoon with 1:1 assistance from clinician. No overt signs/symptoms of aspiration were witnessed.   Swallowing Recommendations   Diet Consistency Recommendations moderately thick liquids/liquidized (level 3);pureed (level 4)   Supervision Level for Intake 1:1 supervision needed   Mode of Delivery Recommendations bolus size, small;slow rate of intake;liquids via spoon only   Postural Recommendations other (see comments)  (sitting at 90 degrees)   Monitoring/Assistance Required (Eating/Swallowing) check mouth frequently for oral residue/pocketing;stop eating activities when fatigue is present;monitor for cough or change in vocal quality with intake   Recommended Feeding/Eating Techniques (Swallow Eval) maintain upright sitting position for eating;maintain upright posture during/after eating for 30 minutes;provide assist with feeding   Medication Administration Recommendations, Swallowing (SLP) Bury in applesauce if patient has increased difficulty.   Instrumental Assessment Recommendations reassess via non-instrumental clinical swallow evaluation   Comment, Swallowing Recommendations Recommend IDDSI level 4 (pureed) and IDDSI Level 3 (moderately/honey) thickened liquids via spoon only. Patinet will require 1:1 feeder during all PO intakes.   General Therapy Interventions   Planned Therapy Interventions Dysphagia Treatment   Dysphagia treatment Modified diet education   Clinical Impression   Criteria for Skilled Therapeutic Interventions Met (SLP Eval) Yes, treatment indicated   SLP Diagnosis Dysphagia   Problem List (SLP) Swallowing   Functional Limitations Related to Problem List (SLP) Patient is unable to tolerate his baseline diet.   Risks & Benefits of therapy have been explained evaluation/treatment results  reviewed;care plan/treatment goals reviewed;risks/benefits reviewed;participants included;patient;daughter   Clinical Impression Comments Patient seen this afternoon for clinical bedside swallow evaluation. Per nursing staff, patient is having increased difficulty with mildly/nectar thickened liquids. Patient's daughter was present at bedside during evaluation. 1 oz of pureed textures were trialed which patient tolerated without difficulty. No overt signs/symptoms of aspiration were observed and no oral pocketing was noted. 1oz. of moderately/honey thickened liquids were trialed via spoon. During trials no overt signs/symptoms of aspiration were noted. Patient did express refusals to any other trials during the evaluation. Recommend IDDSI level 4 (pureed) and IDDSI level 3 (moderately/honey) thickened liquids. Patient will require 1:1 feeder during all PO intake to assist in intake as well as monitor for increasing coughing. If patient begins to have increased difficulty with swallowing, additional oral intake should be held until SLP is able to re-assess swallow functions.   SLP Discharge Planning   SLP Discharge Recommendation Transitional Care Facility   SLP Rationale for DC Rec Patient lived in SNF facility prior to being admitted to this facility.   SLP Brief overview of current status  Patient seen this afternoon for clinical bedside swallow evaluation. Recommend IDDSI 4 (pureed) and IDDSI 3 (moderately/honey) thickened liquids via spoon only. Recommend 1:1 feeder as patient was dependent on assistance from SLP for all oral intake. 1:1 feeder should provide patient with small single bites/spoonfuls of liquids and pace amounts. Patient should be monitored for increased in coughing during PO intake and if this does occur PO intake should be help. Recommend aspiration precautions be followed.   Therapy Certification   Start of Care Date 05/17/22   Certification date from 05/17/22   Certification date to 05/24/22    Medical Diagnosis Altered mental status    Total Evaluation Time   Total Evaluation Time (Minutes) 20   SLP Goals   Therapy Frequency (SLP Eval) 5 times/wk   SLP Predicted Duration/Target Date for Goal Attainment 05/24/22   SLP Goals Swallow   SLP: Safely tolerate diet without signs/symptoms of aspiration Easy to chew diet;Thin liquids;With use of compensatory swallow strategies;With assistance/supervision       Recommendations for Feeding:  - Patient requires 1:1 supervision with eating and drinking  - Liquids via spoon only   - Patient must sit in the chair at 90 degrees (may require pillows behind back)  - Eliminate all distractions; turn off the TV  - Patient should only eat/drink when they are fully alert  - Provide small bites and small spoonfuls  - Alternate between a bite of food and a sip of liquid  - Check for oral pocketing  - Swallow bite of food/liquid before offering another bite/sip   - Patient must remain upright in chair at least 30-45 minutes after oral intake    NOTE: If patient becomes too tired, there are noted changes in their vocal quality/breathing (wet and gurgly), or they begin coughing frequently, stop feeding immediately and complete oral cares. SLP to re-assess swallowing

## 2022-05-17 NOTE — PROGRESS NOTES
Assumed care from 2362-4803.  Repositioned at 0345 & 0500.  Incontinent of urine both times.  Groin is reddened barrier cream on.  Glucose is 67 this am.  Attempted to have him drink orange juice but is distrustful with staff at the moment; no IV dextrose available so notified MD.  Called daughter Marcella in patients room to help while obtaining IV dextrose order.  Daughter was able to encourage him to drink his thickened orange juice; recheck was 78 at 0725.  Day shift nurse updated on current glucose.  Face to face report given with opportunity to observe patient.    Report given to CHRISTIANA Rosales RN   5/17/2022  7:42 AM

## 2022-05-17 NOTE — PLAN OF CARE
"Goal Outcome Evaluation:       /50 (BP Location: Right arm, Patient Position: Semi-Nelson's)   Pulse 74   Temp 97  F (36.1  C) (Tympanic)   Resp 16   Ht 1.651 m (5' 5\")   Wt 80.6 kg (177 lb 11.1 oz)   SpO2 94%   BMI 29.57 kg/m       Pt is alert, confused. Daughter here to visit patient most of the day. VSS, afebrile. Denies pain. Remains on RA, LS coarse throughout. Mucinex and Mucomyst nebs ordered. Seen by speech therapy today, patient was continuing to cough while drinking midly thickened liquids and pureed diet. HRR. On tele: BBB 1st degree AVB marked T waves per ICU report. Mild edema to BLE. Incontinent of urine. Turned and repositioned. Blanchable redness to groin folds. IV SL. IV Unasyn continues. Refused to get out of bed with staff or PT today.     Face to face report given with opportunity to observe patient.    Report given to CHRISTIANA Cordoba RN   5/17/2022  3:01 PM                  "

## 2022-05-17 NOTE — PROGRESS NOTES
Attempted to see patient this PM for swallow eval. Patient unavailable. Will try back later today or early tomorrow morning.

## 2022-05-17 NOTE — PROGRESS NOTES
05/17/22 1400   Appointment Canceled   Appointment Canceled Patient declined   Cancel Comments Pt declined working with PT. Stated  was coming. Asked us to pray with him and daughter was tearful. Updated nurse. Will attempt again tomorrow   Signing Clinician's Name / Credentials   Signing clinician's name / credentials Swapna Perez DPT   Quick Adds   Rehab Discipline PT

## 2022-05-18 NOTE — CARE PLAN
Face to face report given with opportunity to observe patient.    Report given to Mell Rebolledo RN   5/17/2022  11:20 PM

## 2022-05-18 NOTE — PLAN OF CARE
Goal Outcome Evaluation:        Picked up by [facility] at 1010.  Glasses and dentures with patient.  Daughter left earlier this morning

## 2022-05-18 NOTE — DISCHARGE SUMMARY
[facility]  Hospitalist Discharge Summary      Date of Admission:  5/16/2022  Date of Discharge:  5/18/2022  Discharging Provider: Larry Zamora DO  Discharge Service: Hospitalist Service    Discharge Diagnoses      Hypothermia: the patient had not been exposed to cold; dementia can be a cause     Chronic cough, with gradual worsening: the Speech evaluation related this to dysphagia with chronic aspiration     Sudden worsening of balance, walking ability and cognitive status: MRI of head showed chronic microvascular disease and remote infarcts, consistent with vascular dementia     Slight hypercapnia: this may have been related to the chronic aspiration due to dysphagia     Low albumin, in decline since 2020: check prealbumin. Prealbumin was also low, pointing to the development of lack of appetite     Abnormal UA: small leukocyte esterase and few bacteria:  acute cystitis without hematuria treated with antibiotics based on past UTI culture results    Anemia, macrocytic: iron stores were normal; this is likely inflammatory anemia (anemia of chronic disease)      Hospital Course      The rapid decline that started the week prior to the hospital admit continued during the hospital stay. The testing results were discussed with daughter, Marcella, who noted that she and the other siblings had suspected that the decline was a natural one; comfort care measures were elected, with the plan to formerly consult Hospice Care.    The patient passed peacefully at 5:00 am, 5/18/22    Consultations This Hospital Stay   SPEECH LANGUAGE PATH ADULT IP CONSULT  PHYSICAL THERAPY ADULT IP CONSULT  OCCUPATIONAL THERAPY ADULT IP CONSULT    Code Status   No CPR- Do NOT Intubate    Time Spent on this Encounter   ILarry DO, personally saw the patient today and spent less than or equal to 30 minutes discharging this patient.       Larry Zamora DO  [facility]  [address] [place] [address]  [id number]  Phone: [phone]  Fax: [phone]  ______________________________________________________________________

## 2022-05-18 NOTE — PROVIDER NOTIFICATION
MD updated pt very suspicious of staff, refusing medications, attempting to get out of bed, removing O2 tubing, not able to be redirected. Daughter present for medication refusal attempting to encourage pt to take medications, unsuccessful.     MD states that pt will be transitioning to hospice tomorrow, will place new orders.

## 2022-05-18 NOTE — PROGRESS NOTES
Patient with increasing agitation due to dementia.    Since plan is Hospice, held scheduled medications (he was refusing), stopped cardiac monitoring and started Hospice-like Roxinol and Ativan PRN orders      Patient seems to be declining; phoned daughter, Marcella, who is going to come to hospital early; her brother started driving from [place] overnight and might arrive this morning, too.

## 2022-05-18 NOTE — PLAN OF CARE
Requiring 5LPM NC, BPs trending softer. LS very coarse w/rhonchi, weak cough, aspiration suspected, notified MD of secretions and inability to effectively clear as well as significant increase to oxygen supplementation. MD did speak with daughter and plan to transition to hospice tomorrow. Pt had been suspicious of staff but cooperative early in shift, however toward end of shift this increased and became less cooperative and not redirectable, refusing medications, removing oxygen tubing, see previous note. Pt turns independently in bed, reports that he would like to walk around, attempted to assist into chair but pt refused. Incontinent of urine, skin to mary beth area reddened, barrier cream applied. PRN medication given per MAR. PIV SL'd. Alarms on, increased rounding.

## 2022-05-18 NOTE — PLAN OF CARE
Patient was found side lying with red, mucousy vomit present, agonal breathing, and unresponsive. Pt was washed, writer notified provider, and provider notified family. Upon reassessment, pt had taken his last breath. Time of death called at 0500.     Mell Morley RN

## 2022-05-21 LAB
BACTERIA BLD CULT: NO GROWTH
BACTERIA BLD CULT: NO GROWTH
VIT B1 PYROPHOSHATE BLD-SCNC: 84 NMOL/L

## 2022-11-16 NOTE — PROGRESS NOTES
"Complementary Home Visit Template    Antonio Shoemaker  January 9, 2022  Tommy Dwyer  Patient Active Problem List   Diagnosis     Ascites     Benign prostatic hyperplasia with urinary retention     Carotid artery stenosis     Dyslipidemia     Encounter for long-term (current) use of other medications     History of CVA (cerebrovascular accident)     Incidental lung nodule     CVA (cerebrovascular accident) (H)     Osteoarthritis of knee     Status post total left knee replacement     Heart failure with reduced ejection fraction, NYHA class II (H)       Complementary home nursing visit completed this date to evaluate client on homebound status.   Client has had 2 ER visits within the last month. His most recent was for generalized weakness in his legs and arms.   Physical therapy has been suggested for client, but it was in question wether he qualified for in home PT or needs to go to outpatient PT.   During visit, client states he leaves the house approximately \"every 4 days or so\" to go get a few groceries. He reports he does not leave the house for anything else except appointments. He admits that he has shoveled snow recently. He still drives himself to and from the store and often times goes alone. Although client states it's not easy going to the store, he does not qualify him for homebound status at this time. I gave client the local resource guide and highlighted the \"groceries to go\" program. I highly recommended this to client.   Client has been thinking about [facility]. Him and his daughter have talked and they both think it's a good idea, but he's just not sure how he feels about it yet. He has toured a couple [facility], but there are no openings at this time.   Regarding PT, client did not seem much interested in PT. I educated client on the importance of gaining strength and this would help prevent him from having to go to an [facility] at least for the time being. He really wants to stay at home more than anything. When " PT was heavily suggested to client, he reported it would be hard for him to walk all the way across the parking lot (at the hospital).  Called Sherrie, Care coordinator at [facility] and informed her of the above information. I suggested to her trying a referral for PT to [facility] @ [facility] if they do outpatient, as client lives close by there, or possibly choice therapies at the mall. [facility] also has PT at their clinic. These PT options are all within doable walking distance for client from vehicle.   At this time I think client could benefit from a monthly nurse visit elroy  to help with programs such as groceries to go, and talk about correction. Will consider re-admitting client to the palliative maintenance program.     Needed Referrals include: Outpatient PT   Mercedes Flap Text: The defect edges were debeveled with a #15 scalpel blade.  Given the location of the defect, shape of the defect and the proximity to free margins a Mercedes flap was deemed most appropriate.  Using a sterile surgical marker, an appropriate advancement flap was drawn incorporating the defect and placing the expected incisions within the relaxed skin tension lines where possible. The area thus outlined was incised deep to adipose tissue with a #15 scalpel blade.  The skin margins were undermined to an appropriate distance in all directions utilizing iris scissors.